# Patient Record
Sex: FEMALE | Race: WHITE | Employment: PART TIME | URBAN - METROPOLITAN AREA
[De-identification: names, ages, dates, MRNs, and addresses within clinical notes are randomized per-mention and may not be internally consistent; named-entity substitution may affect disease eponyms.]

---

## 2017-06-13 DIAGNOSIS — E55.9 VITAMIN D DEFICIENCY: ICD-10-CM

## 2017-06-13 DIAGNOSIS — K86.89 PANCREATIC INSUFFICIENCY: ICD-10-CM

## 2017-06-13 DIAGNOSIS — E89.1 POST-PANCREATECTOMY DIABETES (H): Primary | ICD-10-CM

## 2017-06-13 DIAGNOSIS — E46 MALNUTRITION (H): ICD-10-CM

## 2017-06-13 DIAGNOSIS — Z90.410 POST-PANCREATECTOMY DIABETES (H): Primary | ICD-10-CM

## 2017-06-13 DIAGNOSIS — E13.9 POST-PANCREATECTOMY DIABETES (H): Primary | ICD-10-CM

## 2017-06-15 ENCOUNTER — APPOINTMENT (OUTPATIENT)
Dept: ULTRASOUND IMAGING | Facility: CLINIC | Age: 42
DRG: 641 | End: 2017-06-15
Attending: STUDENT IN AN ORGANIZED HEALTH CARE EDUCATION/TRAINING PROGRAM
Payer: COMMERCIAL

## 2017-06-15 ENCOUNTER — ALLIED HEALTH/NURSE VISIT (OUTPATIENT)
Dept: TRANSPLANT | Facility: CLINIC | Age: 42
End: 2017-06-15
Attending: PEDIATRICS
Payer: COMMERCIAL

## 2017-06-15 ENCOUNTER — HOSPITAL ENCOUNTER (INPATIENT)
Facility: CLINIC | Age: 42
LOS: 3 days | Discharge: HOME | DRG: 641 | End: 2017-06-18
Attending: EMERGENCY MEDICINE | Admitting: INTERNAL MEDICINE
Payer: COMMERCIAL

## 2017-06-15 ENCOUNTER — APPOINTMENT (OUTPATIENT)
Dept: GENERAL RADIOLOGY | Facility: CLINIC | Age: 42
DRG: 641 | End: 2017-06-15
Payer: COMMERCIAL

## 2017-06-15 ENCOUNTER — DOCUMENTATION ONLY (OUTPATIENT)
Dept: TRANSPLANT | Facility: CLINIC | Age: 42
End: 2017-06-15

## 2017-06-15 ENCOUNTER — TELEPHONE (OUTPATIENT)
Dept: TRANSPLANT | Facility: CLINIC | Age: 42
End: 2017-06-15

## 2017-06-15 ENCOUNTER — APPOINTMENT (OUTPATIENT)
Dept: GENERAL RADIOLOGY | Facility: CLINIC | Age: 42
DRG: 641 | End: 2017-06-15
Attending: INTERNAL MEDICINE
Payer: COMMERCIAL

## 2017-06-15 VITALS — WEIGHT: 120.4 LBS | BODY MASS INDEX: 22.73 KG/M2 | HEIGHT: 61 IN

## 2017-06-15 DIAGNOSIS — E46 MALNUTRITION (H): ICD-10-CM

## 2017-06-15 DIAGNOSIS — E13.9 POST-PANCREATECTOMY DIABETES (H): Primary | ICD-10-CM

## 2017-06-15 DIAGNOSIS — E89.1 POST-PANCREATECTOMY DIABETES (H): ICD-10-CM

## 2017-06-15 DIAGNOSIS — K86.89 PANCREATIC INSUFFICIENCY: ICD-10-CM

## 2017-06-15 DIAGNOSIS — E13.9 POST-PANCREATECTOMY DIABETES (H): ICD-10-CM

## 2017-06-15 DIAGNOSIS — E10.10 DIABETIC KETOACIDOSIS WITHOUT COMA ASSOCIATED WITH TYPE 1 DIABETES MELLITUS (H): Primary | ICD-10-CM

## 2017-06-15 DIAGNOSIS — E55.9 VITAMIN D DEFICIENCY: ICD-10-CM

## 2017-06-15 DIAGNOSIS — Z90.410 POST-PANCREATECTOMY DIABETES (H): Primary | ICD-10-CM

## 2017-06-15 DIAGNOSIS — Z90.410 POST-PANCREATECTOMY DIABETES (H): ICD-10-CM

## 2017-06-15 DIAGNOSIS — E89.1 POST-PANCREATECTOMY DIABETES (H): Primary | ICD-10-CM

## 2017-06-15 DIAGNOSIS — E08.10 DIABETIC KETOACIDOSIS WITHOUT COMA ASSOCIATED WITH DIABETES MELLITUS DUE TO UNDERLYING CONDITION (H): ICD-10-CM

## 2017-06-15 DIAGNOSIS — Z90.410 ABSENCE OF PANCREAS, ACQUIRED TOTAL: ICD-10-CM

## 2017-06-15 PROBLEM — E11.10 DKA (DIABETIC KETOACIDOSES): Status: ACTIVE | Noted: 2017-06-15

## 2017-06-15 LAB
ALBUMIN SERPL-MCNC: 2.9 G/DL (ref 3.4–5)
ALBUMIN SERPL-MCNC: 4 G/DL (ref 3.4–5)
ALBUMIN UR-MCNC: 30 MG/DL
ALP SERPL-CCNC: 112 U/L (ref 40–150)
ALP SERPL-CCNC: 142 U/L (ref 40–150)
ALT SERPL W P-5'-P-CCNC: 246 U/L (ref 0–50)
ALT SERPL W P-5'-P-CCNC: 384 U/L (ref 0–50)
AMYLASE SERPL-CCNC: 10 U/L (ref 30–110)
ANION GAP SERPL CALCULATED.3IONS-SCNC: 10 MMOL/L (ref 3–14)
ANION GAP SERPL CALCULATED.3IONS-SCNC: 12 MMOL/L (ref 3–14)
ANION GAP SERPL CALCULATED.3IONS-SCNC: 13 MMOL/L (ref 3–14)
ANION GAP SERPL CALCULATED.3IONS-SCNC: 22 MMOL/L (ref 3–14)
APPEARANCE UR: ABNORMAL
AST SERPL W P-5'-P-CCNC: 129 U/L (ref 0–45)
AST SERPL W P-5'-P-CCNC: 256 U/L (ref 0–45)
BASE DEFICIT BLDA-SCNC: 20.8 MMOL/L
BASE DEFICIT BLDV-SCNC: 15.6 MMOL/L
BASOPHILS # BLD AUTO: 0 10E9/L (ref 0–0.2)
BASOPHILS # BLD AUTO: 0 10E9/L (ref 0–0.2)
BASOPHILS NFR BLD AUTO: 0.1 %
BASOPHILS NFR BLD AUTO: 0.4 %
BILIRUB DIRECT SERPL-MCNC: 0.1 MG/DL (ref 0–0.2)
BILIRUB SERPL-MCNC: 0.4 MG/DL (ref 0.2–1.3)
BILIRUB SERPL-MCNC: 0.7 MG/DL (ref 0.2–1.3)
BILIRUB UR QL STRIP: NEGATIVE
BUN SERPL-MCNC: 4 MG/DL (ref 7–30)
BUN SERPL-MCNC: 5 MG/DL (ref 7–30)
BUN SERPL-MCNC: 5 MG/DL (ref 7–30)
BUN SERPL-MCNC: 7 MG/DL (ref 7–30)
C PEPTIDE SERPL-MCNC: 0.7 NG/ML (ref 0.9–6.9)
CALCIUM SERPL-MCNC: 6.8 MG/DL (ref 8.5–10.1)
CALCIUM SERPL-MCNC: 7 MG/DL (ref 8.5–10.1)
CALCIUM SERPL-MCNC: 7.2 MG/DL (ref 8.5–10.1)
CALCIUM SERPL-MCNC: 8.4 MG/DL (ref 8.5–10.1)
CHLORIDE SERPL-SCNC: 105 MMOL/L (ref 94–109)
CHLORIDE SERPL-SCNC: 115 MMOL/L (ref 94–109)
CHLORIDE SERPL-SCNC: 115 MMOL/L (ref 94–109)
CHLORIDE SERPL-SCNC: 116 MMOL/L (ref 94–109)
CO2 BLDCOV-SCNC: 5 MMOL/L (ref 21–28)
CO2 SERPL-SCNC: 11 MMOL/L (ref 20–32)
CO2 SERPL-SCNC: 14 MMOL/L (ref 20–32)
CO2 SERPL-SCNC: 7 MMOL/L (ref 20–32)
CO2 SERPL-SCNC: 9 MMOL/L (ref 20–32)
COLOR UR AUTO: YELLOW
CREAT SERPL-MCNC: 0.54 MG/DL (ref 0.52–1.04)
CREAT SERPL-MCNC: 0.55 MG/DL (ref 0.52–1.04)
CREAT SERPL-MCNC: 0.56 MG/DL (ref 0.52–1.04)
CREAT SERPL-MCNC: 0.72 MG/DL (ref 0.52–1.04)
DIFFERENTIAL METHOD BLD: ABNORMAL
DIFFERENTIAL METHOD BLD: ABNORMAL
EOSINOPHIL # BLD AUTO: 0 10E9/L (ref 0–0.7)
EOSINOPHIL # BLD AUTO: 0.1 10E9/L (ref 0–0.7)
EOSINOPHIL NFR BLD AUTO: 0.2 %
EOSINOPHIL NFR BLD AUTO: 1.1 %
ERYTHROCYTE [DISTWIDTH] IN BLOOD BY AUTOMATED COUNT: 15.1 % (ref 10–15)
ERYTHROCYTE [DISTWIDTH] IN BLOOD BY AUTOMATED COUNT: 15.3 % (ref 10–15)
FERRITIN SERPL-MCNC: 111 NG/ML (ref 12–150)
GFR SERPL CREATININE-BSD FRML MDRD: 89 ML/MIN/1.7M2
GFR SERPL CREATININE-BSD FRML MDRD: ABNORMAL ML/MIN/1.7M2
GLUCOSE BLDC GLUCOMTR-MCNC: 189 MG/DL (ref 70–99)
GLUCOSE BLDC GLUCOMTR-MCNC: 190 MG/DL (ref 70–99)
GLUCOSE BLDC GLUCOMTR-MCNC: 209 MG/DL (ref 70–99)
GLUCOSE BLDC GLUCOMTR-MCNC: 215 MG/DL (ref 70–99)
GLUCOSE BLDC GLUCOMTR-MCNC: 220 MG/DL (ref 70–99)
GLUCOSE BLDC GLUCOMTR-MCNC: 270 MG/DL (ref 70–99)
GLUCOSE BLDC GLUCOMTR-MCNC: 303 MG/DL (ref 70–99)
GLUCOSE BLDC GLUCOMTR-MCNC: 340 MG/DL (ref 70–99)
GLUCOSE SERPL-MCNC: 180 MG/DL (ref 70–99)
GLUCOSE SERPL-MCNC: 239 MG/DL (ref 70–99)
GLUCOSE SERPL-MCNC: 312 MG/DL (ref 70–99)
GLUCOSE SERPL-MCNC: 366 MG/DL (ref 70–99)
GLUCOSE UR STRIP-MCNC: >1000 MG/DL
HBA1C MFR BLD: 12.8 % (ref 4.3–6)
HCO3 BLD-SCNC: 6 MMOL/L (ref 21–28)
HCO3 BLDV-SCNC: 11 MMOL/L (ref 21–28)
HCT VFR BLD AUTO: 35.6 % (ref 35–47)
HCT VFR BLD AUTO: 41.2 % (ref 35–47)
HETEROPH AB SER QL: NEGATIVE
HGB BLD-MCNC: 12.1 G/DL (ref 11.7–15.7)
HGB BLD-MCNC: 13.6 G/DL (ref 11.7–15.7)
HGB UR QL STRIP: ABNORMAL
IMM GRANULOCYTES # BLD: 0 10E9/L (ref 0–0.4)
IMM GRANULOCYTES # BLD: 0 10E9/L (ref 0–0.4)
IMM GRANULOCYTES NFR BLD: 0.4 %
IMM GRANULOCYTES NFR BLD: 0.4 %
IRON SATN MFR SERPL: 13 % (ref 15–46)
IRON SERPL-MCNC: 36 UG/DL (ref 35–180)
KETONES BLD-SCNC: 3.9 MMOL/L (ref 0–0.6)
KETONES BLD-SCNC: 4.1 MMOL/L (ref 0–0.6)
KETONES BLD-SCNC: 4.8 MMOL/L (ref 0–0.6)
KETONES BLD-SCNC: 6.1 MMOL/L (ref 0–0.6)
KETONES UR STRIP-MCNC: >150 MG/DL
LACTATE BLD-SCNC: 1.2 MMOL/L (ref 0.7–2.1)
LACTATE BLD-SCNC: 1.4 MMOL/L (ref 0.7–2.1)
LEUKOCYTE ESTERASE UR QL STRIP: NEGATIVE
LYMPHOCYTES # BLD AUTO: 2.1 10E9/L (ref 0.8–5.3)
LYMPHOCYTES # BLD AUTO: 2.4 10E9/L (ref 0.8–5.3)
LYMPHOCYTES NFR BLD AUTO: 22.3 %
LYMPHOCYTES NFR BLD AUTO: 23.9 %
MAGNESIUM SERPL-MCNC: 1.6 MG/DL (ref 1.6–2.3)
MCH RBC QN AUTO: 30.2 PG (ref 26.5–33)
MCH RBC QN AUTO: 30.6 PG (ref 26.5–33)
MCHC RBC AUTO-ENTMCNC: 33 G/DL (ref 31.5–36.5)
MCHC RBC AUTO-ENTMCNC: 34 G/DL (ref 31.5–36.5)
MCV RBC AUTO: 89 FL (ref 78–100)
MCV RBC AUTO: 93 FL (ref 78–100)
MONOCYTES # BLD AUTO: 0.5 10E9/L (ref 0–1.3)
MONOCYTES # BLD AUTO: 0.9 10E9/L (ref 0–1.3)
MONOCYTES NFR BLD AUTO: 4.8 %
MONOCYTES NFR BLD AUTO: 9.1 %
MUCOUS THREADS #/AREA URNS LPF: PRESENT /LPF
NEUTROPHILS # BLD AUTO: 6.5 10E9/L (ref 1.6–8.3)
NEUTROPHILS # BLD AUTO: 6.9 10E9/L (ref 1.6–8.3)
NEUTROPHILS NFR BLD AUTO: 67.9 %
NEUTROPHILS NFR BLD AUTO: 69.4 %
NITRATE UR QL: NEGATIVE
NRBC # BLD AUTO: 0 10*3/UL
NRBC # BLD AUTO: 0 10*3/UL
NRBC BLD AUTO-RTO: 0 /100
NRBC BLD AUTO-RTO: 0 /100
O2/TOTAL GAS SETTING VFR VENT: 21 %
O2/TOTAL GAS SETTING VFR VENT: ABNORMAL %
OSMOLALITY SERPL: 284 MMOL/KG (ref 275–295)
OXYHGB MFR BLD: 96 % (ref 92–100)
PCO2 BLD: 15 MM HG (ref 35–45)
PCO2 BLDV: 20 MM HG (ref 40–50)
PCO2 BLDV: 30 MM HG (ref 40–50)
PH BLD: 7.18 PH (ref 7.35–7.45)
PH BLDV: 7.03 PH (ref 7.32–7.43)
PH BLDV: 7.19 PH (ref 7.32–7.43)
PH UR STRIP: 5.5 PH (ref 5–7)
PHOSPHATE SERPL-MCNC: 0.7 MG/DL (ref 2.5–4.5)
PHOSPHATE SERPL-MCNC: 0.8 MG/DL (ref 2.5–4.5)
PHOSPHATE SERPL-MCNC: 1.2 MG/DL (ref 2.5–4.5)
PLATELET # BLD AUTO: 181 10E9/L (ref 150–450)
PLATELET # BLD AUTO: 244 10E9/L (ref 150–450)
PO2 BLD: 152 MM HG (ref 80–105)
PO2 BLDV: 16 MM HG (ref 25–47)
PO2 BLDV: 21 MM HG (ref 25–47)
POTASSIUM SERPL-SCNC: 3.2 MMOL/L (ref 3.4–5.3)
POTASSIUM SERPL-SCNC: 3.4 MMOL/L (ref 3.4–5.3)
POTASSIUM SERPL-SCNC: 3.6 MMOL/L (ref 3.4–5.3)
POTASSIUM SERPL-SCNC: 3.8 MMOL/L (ref 3.4–5.3)
POTASSIUM SERPL-SCNC: 3.8 MMOL/L (ref 3.4–5.3)
PREALB SERPL IA-MCNC: 12 MG/DL (ref 15–45)
PROT SERPL-MCNC: 5.4 G/DL (ref 6.8–8.8)
PROT SERPL-MCNC: 6.8 G/DL (ref 6.8–8.8)
RBC # BLD AUTO: 4.01 10E12/L (ref 3.8–5.2)
RBC # BLD AUTO: 4.45 10E12/L (ref 3.8–5.2)
RBC #/AREA URNS AUTO: 8 /HPF (ref 0–2)
SAO2 % BLDV FROM PO2: 19 %
SODIUM SERPL-SCNC: 135 MMOL/L (ref 133–144)
SODIUM SERPL-SCNC: 137 MMOL/L (ref 133–144)
SODIUM SERPL-SCNC: 138 MMOL/L (ref 133–144)
SODIUM SERPL-SCNC: 139 MMOL/L (ref 133–144)
SP GR UR STRIP: 1.02 (ref 1–1.03)
SQUAMOUS #/AREA URNS AUTO: 16 /HPF (ref 0–1)
TIBC SERPL-MCNC: 269 UG/DL (ref 240–430)
TRANS CELLS #/AREA URNS HPF: <1 /HPF (ref 0–1)
TROPONIN I SERPL-MCNC: NORMAL UG/L (ref 0–0.04)
URN SPEC COLLECT METH UR: ABNORMAL
UROBILINOGEN UR STRIP-MCNC: NORMAL MG/DL (ref 0–2)
VIT B12 SERPL-MCNC: 1031 PG/ML (ref 193–986)
WBC # BLD AUTO: 9.6 10E9/L (ref 4–11)
WBC # BLD AUTO: 9.9 10E9/L (ref 4–11)
WBC #/AREA URNS AUTO: 2 /HPF (ref 0–2)
YEAST #/AREA URNS HPF: ABNORMAL /HPF

## 2017-06-15 PROCEDURE — 85025 COMPLETE CBC W/AUTO DIFF WBC: CPT | Performed by: INTERNAL MEDICINE

## 2017-06-15 PROCEDURE — 84681 ASSAY OF C-PEPTIDE: CPT | Performed by: PEDIATRICS

## 2017-06-15 PROCEDURE — 27210995 ZZH RX 272: Performed by: STUDENT IN AN ORGANIZED HEALTH CARE EDUCATION/TRAINING PROGRAM

## 2017-06-15 PROCEDURE — 36600 WITHDRAWAL OF ARTERIAL BLOOD: CPT

## 2017-06-15 PROCEDURE — 25800025 ZZH RX 258: Performed by: INTERNAL MEDICINE

## 2017-06-15 PROCEDURE — 82805 BLOOD GASES W/O2 SATURATION: CPT | Performed by: STUDENT IN AN ORGANIZED HEALTH CARE EDUCATION/TRAINING PROGRAM

## 2017-06-15 PROCEDURE — 25000128 H RX IP 250 OP 636: Performed by: FAMILY MEDICINE

## 2017-06-15 PROCEDURE — 82150 ASSAY OF AMYLASE: CPT | Performed by: INTERNAL MEDICINE

## 2017-06-15 PROCEDURE — 83605 ASSAY OF LACTIC ACID: CPT | Performed by: INTERNAL MEDICINE

## 2017-06-15 PROCEDURE — 25000125 ZZHC RX 250: Performed by: STUDENT IN AN ORGANIZED HEALTH CARE EDUCATION/TRAINING PROGRAM

## 2017-06-15 PROCEDURE — 82607 VITAMIN B-12: CPT | Performed by: PEDIATRICS

## 2017-06-15 PROCEDURE — 36415 COLL VENOUS BLD VENIPUNCTURE: CPT | Performed by: STUDENT IN AN ORGANIZED HEALTH CARE EDUCATION/TRAINING PROGRAM

## 2017-06-15 PROCEDURE — 83036 HEMOGLOBIN GLYCOSYLATED A1C: CPT | Performed by: PEDIATRICS

## 2017-06-15 PROCEDURE — 83930 ASSAY OF BLOOD OSMOLALITY: CPT | Performed by: INTERNAL MEDICINE

## 2017-06-15 PROCEDURE — 93010 ELECTROCARDIOGRAM REPORT: CPT | Performed by: INTERNAL MEDICINE

## 2017-06-15 PROCEDURE — 82010 KETONE BODYS QUAN: CPT | Performed by: INTERNAL MEDICINE

## 2017-06-15 PROCEDURE — 84134 ASSAY OF PREALBUMIN: CPT | Performed by: PEDIATRICS

## 2017-06-15 PROCEDURE — 25000125 ZZHC RX 250

## 2017-06-15 PROCEDURE — 71010 XR CHEST PORT 1 VW: CPT

## 2017-06-15 PROCEDURE — 82728 ASSAY OF FERRITIN: CPT | Performed by: PEDIATRICS

## 2017-06-15 PROCEDURE — 82010 KETONE BODYS QUAN: CPT | Performed by: EMERGENCY MEDICINE

## 2017-06-15 PROCEDURE — 82374 ASSAY BLOOD CARBON DIOXIDE: CPT | Performed by: STUDENT IN AN ORGANIZED HEALTH CARE EDUCATION/TRAINING PROGRAM

## 2017-06-15 PROCEDURE — 82803 BLOOD GASES ANY COMBINATION: CPT | Performed by: STUDENT IN AN ORGANIZED HEALTH CARE EDUCATION/TRAINING PROGRAM

## 2017-06-15 PROCEDURE — 80053 COMPREHEN METABOLIC PANEL: CPT | Performed by: PEDIATRICS

## 2017-06-15 PROCEDURE — 85025 COMPLETE CBC W/AUTO DIFF WBC: CPT | Performed by: PEDIATRICS

## 2017-06-15 PROCEDURE — 83550 IRON BINDING TEST: CPT | Performed by: PEDIATRICS

## 2017-06-15 PROCEDURE — 82747 ASSAY OF FOLIC ACID RBC: CPT | Performed by: PEDIATRICS

## 2017-06-15 PROCEDURE — 86308 HETEROPHILE ANTIBODY SCREEN: CPT | Performed by: INTERNAL MEDICINE

## 2017-06-15 PROCEDURE — 82803 BLOOD GASES ANY COMBINATION: CPT

## 2017-06-15 PROCEDURE — 83540 ASSAY OF IRON: CPT | Performed by: PEDIATRICS

## 2017-06-15 PROCEDURE — 82306 VITAMIN D 25 HYDROXY: CPT | Performed by: PEDIATRICS

## 2017-06-15 PROCEDURE — 99211 OFF/OP EST MAY X REQ PHY/QHP: CPT

## 2017-06-15 PROCEDURE — 81001 URINALYSIS AUTO W/SCOPE: CPT | Performed by: EMERGENCY MEDICINE

## 2017-06-15 PROCEDURE — 84446 ASSAY OF VITAMIN E: CPT | Performed by: PEDIATRICS

## 2017-06-15 PROCEDURE — 93005 ELECTROCARDIOGRAM TRACING: CPT

## 2017-06-15 PROCEDURE — 84132 ASSAY OF SERUM POTASSIUM: CPT | Performed by: FAMILY MEDICINE

## 2017-06-15 PROCEDURE — 96361 HYDRATE IV INFUSION ADD-ON: CPT | Mod: 59

## 2017-06-15 PROCEDURE — 20000004 ZZH R&B ICU UMMC

## 2017-06-15 PROCEDURE — 96366 THER/PROPH/DIAG IV INF ADDON: CPT

## 2017-06-15 PROCEDURE — 84590 ASSAY OF VITAMIN A: CPT | Performed by: PEDIATRICS

## 2017-06-15 PROCEDURE — 83605 ASSAY OF LACTIC ACID: CPT

## 2017-06-15 PROCEDURE — 84484 ASSAY OF TROPONIN QUANT: CPT | Performed by: INTERNAL MEDICINE

## 2017-06-15 PROCEDURE — 96365 THER/PROPH/DIAG IV INF INIT: CPT

## 2017-06-15 PROCEDURE — 84630 ASSAY OF ZINC: CPT | Performed by: PEDIATRICS

## 2017-06-15 PROCEDURE — 83735 ASSAY OF MAGNESIUM: CPT | Performed by: INTERNAL MEDICINE

## 2017-06-15 PROCEDURE — 99285 EMERGENCY DEPT VISIT HI MDM: CPT | Mod: 25

## 2017-06-15 PROCEDURE — 84100 ASSAY OF PHOSPHORUS: CPT | Performed by: INTERNAL MEDICINE

## 2017-06-15 PROCEDURE — 80076 HEPATIC FUNCTION PANEL: CPT | Performed by: INTERNAL MEDICINE

## 2017-06-15 PROCEDURE — 84100 ASSAY OF PHOSPHORUS: CPT | Performed by: STUDENT IN AN ORGANIZED HEALTH CARE EDUCATION/TRAINING PROGRAM

## 2017-06-15 PROCEDURE — 80048 BASIC METABOLIC PNL TOTAL CA: CPT | Performed by: INTERNAL MEDICINE

## 2017-06-15 PROCEDURE — 99291 CRITICAL CARE FIRST HOUR: CPT | Mod: GC | Performed by: INTERNAL MEDICINE

## 2017-06-15 PROCEDURE — 83735 ASSAY OF MAGNESIUM: CPT | Performed by: STUDENT IN AN ORGANIZED HEALTH CARE EDUCATION/TRAINING PROGRAM

## 2017-06-15 PROCEDURE — 80048 BASIC METABOLIC PNL TOTAL CA: CPT | Performed by: STUDENT IN AN ORGANIZED HEALTH CARE EDUCATION/TRAINING PROGRAM

## 2017-06-15 PROCEDURE — 25000132 ZZH RX MED GY IP 250 OP 250 PS 637: Performed by: STUDENT IN AN ORGANIZED HEALTH CARE EDUCATION/TRAINING PROGRAM

## 2017-06-15 PROCEDURE — 99285 EMERGENCY DEPT VISIT HI MDM: CPT | Mod: Z6 | Performed by: EMERGENCY MEDICINE

## 2017-06-15 PROCEDURE — 87040 BLOOD CULTURE FOR BACTERIA: CPT | Performed by: STUDENT IN AN ORGANIZED HEALTH CARE EDUCATION/TRAINING PROGRAM

## 2017-06-15 PROCEDURE — 82150 ASSAY OF AMYLASE: CPT | Performed by: STUDENT IN AN ORGANIZED HEALTH CARE EDUCATION/TRAINING PROGRAM

## 2017-06-15 PROCEDURE — 76700 US EXAM ABDOM COMPLETE: CPT

## 2017-06-15 PROCEDURE — 00000146 ZZHCL STATISTIC GLUCOSE BY METER IP

## 2017-06-15 PROCEDURE — 82010 KETONE BODYS QUAN: CPT | Performed by: STUDENT IN AN ORGANIZED HEALTH CARE EDUCATION/TRAINING PROGRAM

## 2017-06-15 PROCEDURE — 96375 TX/PRO/DX INJ NEW DRUG ADDON: CPT

## 2017-06-15 PROCEDURE — 25000128 H RX IP 250 OP 636: Performed by: STUDENT IN AN ORGANIZED HEALTH CARE EDUCATION/TRAINING PROGRAM

## 2017-06-15 PROCEDURE — 25000128 H RX IP 250 OP 636: Performed by: EMERGENCY MEDICINE

## 2017-06-15 PROCEDURE — 74000 XR ABDOMEN PORT F1 VW: CPT

## 2017-06-15 PROCEDURE — 25000125 ZZHC RX 250: Performed by: FAMILY MEDICINE

## 2017-06-15 RX ORDER — DEXTROSE MONOHYDRATE 25 G/50ML
25-50 INJECTION, SOLUTION INTRAVENOUS
Status: DISCONTINUED | OUTPATIENT
Start: 2017-06-15 | End: 2017-06-15

## 2017-06-15 RX ORDER — AMITRIPTYLINE HYDROCHLORIDE 100 MG/1
100 TABLET ORAL AT BEDTIME
Status: DISCONTINUED | OUTPATIENT
Start: 2017-06-15 | End: 2017-06-18 | Stop reason: HOSPADM

## 2017-06-15 RX ORDER — DEXTROSE MONOHYDRATE, SODIUM CHLORIDE, AND POTASSIUM CHLORIDE 50; 2.24; 4.5 G/1000ML; G/1000ML; G/1000ML
INJECTION, SOLUTION INTRAVENOUS CONTINUOUS
Status: DISCONTINUED | OUTPATIENT
Start: 2017-06-15 | End: 2017-06-17

## 2017-06-15 RX ORDER — OXYCODONE HYDROCHLORIDE 5 MG/1
5 TABLET ORAL EVERY 6 HOURS PRN
Status: DISCONTINUED | OUTPATIENT
Start: 2017-06-15 | End: 2017-06-16

## 2017-06-15 RX ORDER — OXYCODONE HYDROCHLORIDE 30 MG/1
30 TABLET ORAL EVERY 4 HOURS PRN
Status: DISCONTINUED | OUTPATIENT
Start: 2017-06-15 | End: 2017-06-15

## 2017-06-15 RX ORDER — POTASSIUM CHLORIDE 1.5 G/1.58G
20-40 POWDER, FOR SOLUTION ORAL
Status: DISCONTINUED | OUTPATIENT
Start: 2017-06-15 | End: 2017-06-17

## 2017-06-15 RX ORDER — LIDOCAINE HYDROCHLORIDE 10 MG/ML
INJECTION, SOLUTION EPIDURAL; INFILTRATION; INTRACAUDAL; PERINEURAL
Status: COMPLETED
Start: 2017-06-15 | End: 2017-06-15

## 2017-06-15 RX ORDER — NICOTINE POLACRILEX 4 MG
15-30 LOZENGE BUCCAL
Status: DISCONTINUED | OUTPATIENT
Start: 2017-06-15 | End: 2017-06-15

## 2017-06-15 RX ORDER — POTASSIUM CHLORIDE 29.8 MG/ML
20 INJECTION INTRAVENOUS
Status: DISCONTINUED | OUTPATIENT
Start: 2017-06-15 | End: 2017-06-17

## 2017-06-15 RX ORDER — ONDANSETRON 2 MG/ML
8 INJECTION INTRAMUSCULAR; INTRAVENOUS ONCE
Status: COMPLETED | OUTPATIENT
Start: 2017-06-15 | End: 2017-06-15

## 2017-06-15 RX ORDER — MORPHINE SULFATE 4 MG/ML
4 INJECTION, SOLUTION INTRAMUSCULAR; INTRAVENOUS ONCE
Status: COMPLETED | OUTPATIENT
Start: 2017-06-15 | End: 2017-06-15

## 2017-06-15 RX ORDER — DEXTROSE MONOHYDRATE 25 G/50ML
25-50 INJECTION, SOLUTION INTRAVENOUS
Status: DISCONTINUED | OUTPATIENT
Start: 2017-06-15 | End: 2017-06-17

## 2017-06-15 RX ORDER — POTASSIUM CHLORIDE 7.45 MG/ML
10 INJECTION INTRAVENOUS
Status: DISCONTINUED | OUTPATIENT
Start: 2017-06-15 | End: 2017-06-17

## 2017-06-15 RX ORDER — GABAPENTIN 600 MG/1
600 TABLET ORAL AT BEDTIME
Status: DISCONTINUED | OUTPATIENT
Start: 2017-06-15 | End: 2017-06-15

## 2017-06-15 RX ORDER — POTASSIUM CHLORIDE 750 MG/1
20-40 TABLET, EXTENDED RELEASE ORAL
Status: DISCONTINUED | OUTPATIENT
Start: 2017-06-15 | End: 2017-06-17

## 2017-06-15 RX ORDER — ONDANSETRON 2 MG/ML
4 INJECTION INTRAMUSCULAR; INTRAVENOUS EVERY 6 HOURS PRN
Status: DISCONTINUED | OUTPATIENT
Start: 2017-06-15 | End: 2017-06-18 | Stop reason: HOSPADM

## 2017-06-15 RX ORDER — NICOTINE POLACRILEX 4 MG
15-30 LOZENGE BUCCAL
Status: DISCONTINUED | OUTPATIENT
Start: 2017-06-15 | End: 2017-06-17

## 2017-06-15 RX ORDER — MAGNESIUM SULFATE HEPTAHYDRATE 40 MG/ML
4 INJECTION, SOLUTION INTRAVENOUS EVERY 4 HOURS PRN
Status: DISCONTINUED | OUTPATIENT
Start: 2017-06-15 | End: 2017-06-18 | Stop reason: HOSPADM

## 2017-06-15 RX ORDER — TRAZODONE HYDROCHLORIDE 100 MG/1
100 TABLET ORAL AT BEDTIME
Status: DISCONTINUED | OUTPATIENT
Start: 2017-06-15 | End: 2017-06-15

## 2017-06-15 RX ORDER — POTASSIUM CL/LIDO/0.9 % NACL 10MEQ/0.1L
10 INTRAVENOUS SOLUTION, PIGGYBACK (ML) INTRAVENOUS
Status: DISCONTINUED | OUTPATIENT
Start: 2017-06-15 | End: 2017-06-17

## 2017-06-15 RX ORDER — NALOXONE HYDROCHLORIDE 0.4 MG/ML
.1-.4 INJECTION, SOLUTION INTRAMUSCULAR; INTRAVENOUS; SUBCUTANEOUS
Status: DISCONTINUED | OUTPATIENT
Start: 2017-06-15 | End: 2017-06-18 | Stop reason: HOSPADM

## 2017-06-15 RX ADMIN — POTASSIUM CHLORIDE: 2 INJECTION, SOLUTION, CONCENTRATE INTRAVENOUS at 16:26

## 2017-06-15 RX ADMIN — SODIUM CHLORIDE 1000 ML: 9 INJECTION, SOLUTION INTRAVENOUS at 10:07

## 2017-06-15 RX ADMIN — MORPHINE SULFATE 4 MG: 4 INJECTION, SOLUTION INTRAMUSCULAR; INTRAVENOUS at 11:23

## 2017-06-15 RX ADMIN — SODIUM CHLORIDE 1000 ML: 9 INJECTION, SOLUTION INTRAVENOUS at 11:25

## 2017-06-15 RX ADMIN — HUMAN INSULIN 5.5 UNITS/HR: 100 INJECTION, SOLUTION SUBCUTANEOUS at 18:59

## 2017-06-15 RX ADMIN — POTASSIUM CHLORIDE, DEXTROSE MONOHYDRATE AND SODIUM CHLORIDE: 224; 5; 450 INJECTION, SOLUTION INTRAVENOUS at 21:40

## 2017-06-15 RX ADMIN — ONDANSETRON 4 MG: 2 INJECTION INTRAMUSCULAR; INTRAVENOUS at 14:46

## 2017-06-15 RX ADMIN — POTASSIUM CHLORIDE, DEXTROSE MONOHYDRATE AND SODIUM CHLORIDE: 224; 5; 450 INJECTION, SOLUTION INTRAVENOUS at 17:25

## 2017-06-15 RX ADMIN — ONDANSETRON 8 MG: 2 INJECTION INTRAMUSCULAR; INTRAVENOUS at 11:23

## 2017-06-15 RX ADMIN — ONDANSETRON 4 MG: 2 INJECTION INTRAMUSCULAR; INTRAVENOUS at 23:19

## 2017-06-15 RX ADMIN — SODIUM CHLORIDE 1000 ML: 9 INJECTION, SOLUTION INTRAVENOUS at 17:53

## 2017-06-15 RX ADMIN — LIDOCAINE HYDROCHLORIDE: 10 INJECTION, SOLUTION EPIDURAL; INFILTRATION; INTRACAUDAL; PERINEURAL at 11:55

## 2017-06-15 RX ADMIN — HUMAN INSULIN 3 UNITS/HR: 100 INJECTION, SOLUTION SUBCUTANEOUS at 14:27

## 2017-06-15 RX ADMIN — HUMAN INSULIN 4.5 UNITS/HR: 100 INJECTION, SOLUTION SUBCUTANEOUS at 11:30

## 2017-06-15 RX ADMIN — SODIUM PHOSPHATE, MONOBASIC, MONOHYDRATE AND SODIUM PHOSPHATE, DIBASIC, ANHYDROUS 25 MMOL: 276; 142 INJECTION, SOLUTION INTRAVENOUS at 21:36

## 2017-06-15 ASSESSMENT — PAIN DESCRIPTION - DESCRIPTORS: DESCRIPTORS: ACHING;BURNING;CONSTANT

## 2017-06-15 ASSESSMENT — ENCOUNTER SYMPTOMS
DIAPHORESIS: 0
CONSTIPATION: 0
APPETITE CHANGE: 1
SORE THROAT: 0
NAUSEA: 1
VOMITING: 1
HEMATURIA: 0
SHORTNESS OF BREATH: 1
LIGHT-HEADEDNESS: 1
EYE REDNESS: 0
HEADACHES: 1
DYSURIA: 0
RHINORRHEA: 0
COUGH: 0
ARTHRALGIAS: 0
DIARRHEA: 1
EYE DISCHARGE: 0
MYALGIAS: 0
FEVER: 0
ABDOMINAL PAIN: 1
CHILLS: 0
DIZZINESS: 1

## 2017-06-15 NOTE — H&P
Medical ICU  History & Physical    Beverly Elena MRN: [id number]  Age: 41 year old, : 1975  Date of Admission:6/15/2017  Primary care provider: Marleny Hathaway       Chief Complaint     Abnormal labs at clinic/ nausea      History of Present Illness     Ms. 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 She presented from clinic with abnormal labs.    Patient reports that she has been having recent nausea and abdominal pain. She notes she has also had a difficult time controlling her blood sugars at home due to her gastroparesis. She is on an insulin pump with a basal rate of 1-2units/hr. Notes she corrects her blood sugar with bolus insulin 6 times a day. She has a had blood glucose levels of 300-600s. She also reports having a few times low blood sugars as low as 60. She finds that it has been difficult to control her blood sugars because her gastric emptying is unpredictable. She notes abdominal pain, more prominent in the umbilical area. She denies a change in her stool. She has chronic diarrhea, reports some more bulk in her stool. Patient notes that she has been having shortness of breath in the last few weeks. No cough. No fever. She denies sore throat. She has no chest pain. Reports generalized fatigue. She was at endocrinology visit prior to presenting to the ED and was noted to have abnormal labs.     In the ED the patient was noted to be tachypneic. Her labs from clinic demonstrated she had an anion gap acidosis and blood glucose of 312. Her PH on venous blood gas was 7.03 and her pCO2 20. She was given 2L bolus of NS, and started on insulin drip and maintenance IV fluids. She was subsequently transferred to the ICU for further work up.      Past Medical History     Past Medical History:   Diagnosis Date     Absence of pancreas,  acquired total 6/28/2013     Bile duct disease      Chronic pain      Chronic pancreatitis (H) 6/28/2013     Chronic relapsing pancreatitis (H) 6/28/2013     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) 6/28/2013     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) 6/28/2013     Endometriosis      Endometriosis 9/9/2011     Exocrine pancreatic insufficiency 6/28/2013     Gastro-oesophageal reflux disease      Gastroparesis      Gastroparesis 7/23/2012     Iron deficiency anemia 7/12/2013     Other chronic pain      Pancreatic disease     history of pancreatitis     Pancreatic divisum      Pancreatic divisum 10/1/2012     Pneumonia, organism unspecified 7/17/2013     PONV (postoperative nausea and vomiting)     Nausea     Portal vein thrombosis 6/29/2013     Recurrent acute pancreatitis 1/17/2013     Sphincter of Oddi dysfunction 1/17/2013     Syncope     X 2     Type 2 diabetes mellitus without complications (H)           Past Surgical History     Past Surgical History:   Procedure Laterality Date     APPENDECTOMY       CHOLECYSTECTOMY       COLONOSCOPY  5/21/2014    Procedure: COMBINED COLONOSCOPY, SINGLE BIOPSY/POLYPECTOMY BY BIOPSY;  Surgeon: Hugo Lind MD;  Location: [facility]     ENDOSCOPIC INSERTION TUBE GASTROSTOMY  2/5/2014    Procedure: ENDOSCOPIC INSERTION TUBE GASTROSTOMY;  Endoscopic percutaneous gastro-jejunostomy tube placement (removal of previous gastrostomy tube)  ;  Surgeon: Sharon Oh MD;  Location: [facility]     ENDOSCOPIC RETROGRADE CHOLANGIOPANCREATOGRAM  9/9/2011    Procedure:ENDOSCOPIC RETROGRADE CHOLANGIOPANCREATOGRAM; Endoscopic Retrograde Cholangiopancreatogram with C Arm, Biliary Sphincterotomy, insertion Bile Duct Stent X 1; Surgeon:SU GONZALES; [facility]     ENDOSCOPIC RETROGRADE CHOLANGIOPANCREATOGRAM  12/15/2011    Procedure:ENDOSCOPIC RETROGRADE CHOLANGIOPANCREATOGRAM; Endoscopic Retrograde Cholangiopancreatogram (c-arm), with pancreatic stent placement,  sphincterotomy; Surgeon:JABIER HEATH; Location:[facility]     ENDOSCOPIC RETROGRADE CHOLANGIOPANCREATOGRAM  6/14/2012    Procedure: ENDOSCOPIC RETROGRADE CHOLANGIOPANCREATOGRAM;  endoscopic retrograde cholangiopancreatogram with pancreatic stent placement  (c-arm);  Surgeon: Jabier Heath MD;  [facility]: [facility]     ESOPHAGOSCOPY, GASTROSCOPY, DUODENOSCOPY (EGD), COMBINED  11/9/2011    Procedure:COMBINED ESOPHAGOSCOPY, GASTROSCOPY, DUODENOSCOPY (EGD); Surgeon:TREE DEAN; Location:[facility]     ESOPHAGOSCOPY, GASTROSCOPY, DUODENOSCOPY (EGD), COMBINED  5/21/2014    Procedure: COMBINED ESOPHAGOSCOPY, GASTROSCOPY, DUODENOSCOPY (EGD), BIOPSY SINGLE OR MULTIPLE;  Surgeon: Hugo Lind MD;  Location: [facility]     HC CHANGE GASTROSTOMY TUBE PERC, WO IMAGING OR ENDO GUIDE N/A 11/25/2014    Procedure: CHANGE GASTROSTOMY TUBE WITHOUT SCOPE;  Surgeon: Sharon Oh MD;  Location: [facility]     HC IMPLANT PERIPH/GASTRIC NEUROSTIM/  1/11/2012    gastric pacemaker     HC REPLACE DUODENOSTOMY/JEJUNOSTOMY TUBE PERCUTANEOUS  2/12/2014    Procedure: REPLACE JEJUNOSTOMY TUBE, PERCUTANEOUS;  Surgeon: Sharon Oh MD;  [facility]: [facility]     HC UGI ENDOSCOPY W EUS  9/7/2011    Procedure:COMBINED ENDOSCOPIC ULTRASOUND, ESOPHAGOSCOPY, GASTROSCOPY, DUODENOSCOPY (EGD); Surgeon:TREE DEAN; [facility]     HC UGI ENDOSCOPY W EUS  12/4/2012    Procedure: COMBINED ENDOSCOPIC ULTRASOUND, ESOPHAGOSCOPY, GASTROSCOPY, DUODENOSCOPY (EGD);  Surgeon: Sharon Oh MD;  Location: [facility]     HC UGI ENDOSCOPY W EUS  5/8/2013    Procedure: COMBINED ENDOSCOPIC ULTRASOUND, ESOPHAGOSCOPY, GASTROSCOPY, DUODENOSCOPY (EGD);  Surgeon: Tree Dean MD;  Location: [facility]     HC UGI ENDOSCOPY W PLACEMENT GASTROSTOMY TUBE PERCUT N/A 2/19/2016    Procedure: COMBINED ESOPHAGOSCOPY, GASTROSCOPY, DUODENOSCOPY (EGD), PLACE PERCUTANEOUS ENDOSCOPIC GASTROSTOMY TUBE;  Surgeon: Sharon Oh MD;  Location: [facility]     LAPAROSCOPIC  IMPLANT GASTRIC STIMULATOR  2012    Procedure:LAPAROSCOPIC IMPLANT GASTRIC STIMULATOR; LAPAROSCOPIC IMPLANT GASTRIC STIMULATOR ; Surgeon:CHONG HART; [facility]     LAPAROSCOPIC SALPINGO-OOPHORECTOMY       LAPAROSCOPIC SALPINGOTOMY      R     LAPAROTOMY EXPLORATORY  2013    Procedure: LAPAROTOMY EXPLORATORY;  Exploratory laparotomy, portal vein declotting with tissue plasminogen activator administration, thrombectomy and embolectomy of portal vein, intraoperative trans-sonic flow monitoring, intraoperative ultrasound;  Surgeon: Rajendra Cruz MD;  Location: [facility]     neurostimulator[  2011     PANCREATECTOMY, TRANSPLANT AUTO ISLET CELL, COMBINED  2013    Procedure: COMBINED PANCREATECTOMY, TRANSPLANT AUTO ISLET CELL;  Total Pancreatectomy, Auto Islet Cell Transplant             Anesthesia General with block;  Surgeon: Rajendra Cruz MD;  Location: [facility]     TUBAL LIGATION             Social History     Patient lives in [place]. She is an ER physician. She reports social alcohol use, denies smoking and illicit drug use.      Family History     Family History   Problem Relation Age of Onset     C.A.D. Father      GI: V, D, anorexia     Blood Disease Father      lymphoma -  from pulmonary fibrosis.     Malignant Hyperthermia Brother      DIABETES Maternal Grandmother      type 2 with older age     DIABETES Maternal Grandfather      type 2 with older age     Cancer - colorectal Maternal Grandfather      DIABETES Paternal Grandmother      type 2 with older age     Connective Tissue Disorder Sister      scleroderma          Allergies     Allergies   Allergen Reactions     Penicillin G Anaphylaxis     Penicillins Anaphylaxis     2013 - pt tolerated ertapenem     Corticosteroids Other (See Comments)     All oral,IV and injectable steroids are contraindicated (unless in life threatening situations) in Islet Auto transplant recipients. They can cause irreversible loss of islet cell  "function. Please contact patients transplant care coordinator LAUREN Gerard RN at /pager   and Endocrinologist prior to administration.     Ertapenem Other (See Comments)     Elevated LFT's  Elevated LFT's     Merrem [Meropenem] Other (See Comments)     Elevated LFTs >3x, happened with invanz too  Elevated LFT's     Other [Seasonal Allergies]      Orange causes rash, NV     Vancomycin Other (See Comments)     osiel syndrome     Vancomycin Other (See Comments)     Gets \"red lizandro syndrome\"     Orange Oil Nausea and Vomiting and Rash          Medications     Prescriptions Prior to Admission   Medication Sig Dispense Refill Last Dose     amitriptyline (ELAVIL) 100 MG tablet Take 1 tablet (100 mg) by mouth At Bedtime 30 tablet 11 6/14/2017 at PM     esomeprazole (NEXIUM) 40 MG capsule Take 1 capsule (40 mg) by mouth 2 times daily Take 30-60 minutes before eating. 90 capsule 3 6/15/2017 at AM     amylase-lipase-protease (CREON) 06763 UNITS CPEP 3 with meals & 2 with snacks (Patient taking differently: 4 with meals & 2 with snacks) 450 capsule 0 6/15/2017 at AM     Insulin Aspart (INSULIN PUMP - OUTPATIENT) 1 unit/hour   6/15/2017 at Unknown time     Probiotic Product (PROBIOTIC DAILY PO) Take 2 tablets by mouth daily   6/14/2017 at Unknown time     Cholecalciferol (VITAMIN D3 PO) Take 8,000 Units by mouth daily    Past Week at Unknown time     ondansetron (ZOFRAN-ODT) 8 MG disintegrating tablet Every 4-6 hours as needed   6/15/2017 at AM     metoclopramide (REGLAN) 10 MG tablet Take 1 tablet by mouth every 6 hours as needed   6/14/2017 at AM     Multiple Vitamins-Minerals (ADEKS) chewable tablet Take 1 tablet by mouth 2 times daily (Patient taking differently: Take 2 tablets by mouth 2 times daily ) 60 tablet 3 6/14/2017 at AM     ACCU-CHEK FASTCLIX LANCETS MISC 1 each every 2 hours as needed. 3 each 3 Unknown     glucose blood VI test strips strip Use 6-8 times daily to check blood glucose " "levels.  (Accu-chek Smartview test strips) 3 Month 3 Unknown     Alcohol Swabs 70 % PADS Use to check blood glucose 6-8 times daily 1 each 3 Unknown     BD SHARPS CONTAINER HOME MISC Use to dispose of used sharps 1 each 0 Unknown     glucose 40 % GEL Take 15 g by mouth every hour as needed. 10 each 1 Unknown     rizatriptan (MAXALT) 10 MG tablet Take 10 mg by mouth at onset of headache    More than a month at Unknown time       amitriptyline  100 mg Oral At Bedtime     amylase-lipase-protease  3 capsule Oral TID w/meals     amylase-lipase-protease  3 tablet Oral TID w/meals     cholecalciferol (vitamin  -D) capsule 8,000 Units  8,000 Units Oral Daily     gabapentin  600 mg Oral At Bedtime     traZODone  100 mg Oral At Bedtime     sodium chloride 0.9%  1,000 mL Intravenous Once           Intake/Output   2L BOLUS in ED       Physical Exam     Vital signs:  Temp: 96.8  F (36  C) Temp src: Oral BP: 121/78 Pulse: 117 Heart Rate: 101 Resp: 21 SpO2: 100 % O2 Device: None (Room air)   Height: 154.9 cm (5' 1\") Weight: 56.7 kg (125 lb)  Estimated body mass index is 23.62 kg/(m^2) as calculated from the following:    Height as of this encounter: 1.549 m (5' 1\").    Weight as of this encounter: 56.7 kg (125 lb).      Gen: alert, tachypnea   NEURO: moving extremities equally, no focal deficits  HEENT:AT/ NC, PERRL b/l,  No scleral icterus   PULM/THORAX: clear breath sounds   CV:RRR, S1 and S2 appreciated, no extra heart sounds, murmurs or rub auscultated. No JVD  ABD: flat, soft, tender in the epigastric region and left quadrant, no guarding or rebound, abdominal scars from prior surgery, gtube in the left quadrant   Rectal:deferred   EXT: warm and well perfused    SKIN: Capillary refill normal,      Labs     Results for orders placed or performed during the hospital encounter of 06/15/17 (from the past 24 hour(s))   Ketone Beta-Hydroxybutyrate Quantitative   Result Value Ref Range    Ketone Quantitative 6.1 (HH) 0.0 - 0.6 " mmol/L   XR Chest Port 1 View    Narrative    EXAMINATION: XR CHEST PORT 1 VW  6/15/2017 10:57 AM      CLINICAL HISTORY: Worsening dyspnea     COMPARISON: 7/19/2013        FINDINGS:  Single frontal view of the chest. Neurostimulator leads projecting  over high/mid thoracic spine. Removal of right IJ central venous  catheter. Surgical clips in partially visualized upper abdomen.    Cardiac silhouette within normal limits. Bronchovascular markings are  unremarkable. No focal airspace opacity. No pleural effusion or  pneumothorax. Partially visualized upper abdomen is unremarkable.        Impression    IMPRESSION:  No acute airspace disease.    I have personally reviewed the examination and initial interpretation  and I agree with the findings.    CUATE KELLER MD   Glucose by meter   Result Value Ref Range    Glucose 340 (H) 70 - 99 mg/dL   ISTAT gases lactate maxwell POCT   Result Value Ref Range    Ph Venous 7.03 (LL) 7.32 - 7.43 pH    PCO2 Venous 20 (L) 40 - 50 mm Hg    PO2 Venous 21 (L) 25 - 47 mm Hg    Bicarbonate Venous 5 (LL) 21 - 28 mmol/L    O2 Sat Venous 19 %    Lactic Acid 1.2 0.7 - 2.1 mmol/L   UA with Microscopic reflex to Culture   Result Value Ref Range    Color Urine Yellow     Appearance Urine Slightly Cloudy     Glucose Urine >1000 (A) NEG mg/dL    Bilirubin Urine Negative NEG    Ketones Urine >150 (A) NEG mg/dL    Specific Gravity Urine 1.024 1.003 - 1.035    Blood Urine Trace (A) NEG    pH Urine 5.5 5.0 - 7.0 pH    Protein Albumin Urine 30 (A) NEG mg/dL    Urobilinogen mg/dL Normal 0.0 - 2.0 mg/dL    Nitrite Urine Negative NEG    Leukocyte Esterase Urine Negative NEG    Source Midstream Urine     WBC Urine 2 0 - 2 /HPF    RBC Urine 8 (H) 0 - 2 /HPF    Yeast Urine Few (A) NEG /HPF    Squamous Epithelial /HPF Urine 16 (H) 0 - 1 /HPF    Transitional Epi <1 0 - 1 /HPF    Mucous Urine Present (A) NEG /LPF   Potassium   Result Value Ref Range    Potassium 3.8 3.4 - 5.3 mmol/L   Glucose by meter   Result  Value Ref Range    Glucose 303 (H) 70 - 99 mg/dL   Glucose by meter   Result Value Ref Range    Glucose 270 (H) 70 - 99 mg/dL   Glucose by meter   Result Value Ref Range    Glucose 220 (H) 70 - 99 mg/dL         Assessment and Plan      Ms. Hart is a 41 year old female with a PMH significant chronic pancreatitis s/p total pancreatectomy and auto islet transplant, gastroparesis, chronic malabsorption, endometriosis who presents with diabetic ketoacidosis.    Neurologic  #Abdominal Pain  Patient has new onset abdominal pain for the last few weeks. Unknown etiology, current pain, most likely secondary to DKA. Not concerned for SBO or partial obstruction given the patient is stooling. Not concerned for a surgical abdomen at this time. Has peg tube.   -PRN oxycodone     Cardiovascular  #Sinus Tachycarida  Likely secondary to hypovolemia in the setting of DKA, discomfort from increased, respiratory drive. EKG with twave abnormalities.  -troponin    Respiratory  #Tachypnea   #Metabolic acidosis with respiratory compensation.   Compensatory in the setting of a metabolic acidosis. Ph. 7.03 on venous blood gas, anion gap 22, bicarb 7, and pCO2 20.   -vbg q2 hours    Gastrointestinal  #Elevated Transaminases   Mild elevation in AST and ALT, unclear etiology. Dx includes alcohol had a glass of wine yesterday, viral, drug, hypoperfusion in setting of DKA. Has history of portal venous thrombosis following pancreatectomy  -us abdomen with doppler  -trend LFTs     #Abdominal Pain   #Nausea  Patient reports a few weeks of abdominal pain, with worsening in the last few days. Likely possible gastroenteritis, versus secondary to DKA currently. No signs of surgical abdomen.   -transplant surgery consult given hx of pancreatectomy and autoislet cell transplant.    #Hx of pancreatectomy s/p islet cell transplant   #Hx of chronic pancreatitis  Patient with history of chronic pancreatitis secondary to pancreatitic s/p pancreatectomy and auto  islet cell transplant in 2013. As a result has diabetes and chronic malabsorption. On insulin pump and taking pancreatic enzymes.  -continue home creon supplements 91215 units with 1-2 tablets with meals      #Gastroparesis w/pacer and peg tube   Patient has gastroparesis s/p laparoscopic placement of gastric pacemaker in 2012. Also has peg tube, which is currently not using.     Tube Feeds  None     Genitourinary  No acute issues     Infectious Disease  No clear sign of infection. Afebrile, normal WBC.    Hematology  No active issues. Cell lines normal     Endocrine  #Type II Diabetes s/p pancreatectomy and auto-islet cell transplant  #DKA   Patient with poorly controlled diabetes s/p autoislet cell transplant. Complicated by gastroparesis and variable gastric output. HgbA1c is 12.8. Has home insulin pump with baseline rate of 1-2units/hr and boluses 6 x daily. Presented with blood glucose of 312, ketosis, ketonuria. Started on insulin drip.   -dka protocol  -insulin drip  -maintenance fluids   -endocrine consult     Renal/Electolytes/FEN   ~Baseline creatinine 0.6-0.8    #Anion Gap Metabolic Acidosis  Secondary to DKA. Bicarb of 7, Anion gap of 22, ketone level of 6.1. Ketonuria  Received fluid resuscitation and initiated on the insulin drip.  -BMP q2 hours  -Ketones q2 hours   -phosphorous q2 hours     #Sodium  Corrected sodium in the setting of hyperglycemia is 138. Will continue to monitor with q2 hours BMP q2 hours    #Potassium  3.4 on presentation to clinic. In the setting of DKA total body potassium stores low, despite normal serum potassium. Will continue to monitor.   -BMP q2 hours       Skin Care  No acute issues     PPX: none     CODE: Full Code     Family:  at bedside     Patient seen and discussed with staff attending, Dr. Jeffery.  Please feel free to page with questions.    Smith Marsh MD  PGY-1  Internal Medicine   [id number]

## 2017-06-15 NOTE — ED PROVIDER NOTES
History     Chief Complaint   Patient presents with     Abnormal Labs     HPI  Beverly Elena is a 41 year old female who presents to the ED with abnormal labs.    Patient is s/p TPIAT (total pancreatectomy with auto islet cell transplant) for debilitating chronic pancreatitis, gastroparesis (s/p gastric pacer), exocrine pancreatic insufficiency (acquired), s/p G tube (not currently using), chronic diarrhea (2/2 malabsorption), asplenia, h/o portal vein thrombosis, GERD, endometriosis who presents to the ED with abnormal lab values. She reports having unpredictable high blood sugars which have been challenging to control since the beginning of this year. Has an insulin pump in that runs at a basal rate of 1-2 units/hr and patient boluses for meals. Overall she feels that her blood sugars are hard to control because of the unpredictable meal absorption due to chronic gastroparesis, diarrhea. More acutely, has not been feeling well over the last 2 weeks. Has noticed decreased appetite, has not been eating well, a couple episodes of vomiting, is nauseous and has periumbilical abdominal pain. Also admits to feeling bloated all the time. No increase in the baseline chronic diarrhea. Denies fever, cough. Has shortness of breath that she first noticed about a week ago. Lives in [place] and is looking to establish care with transplant Endo at the [facility].     I have reviewed the Medications, Allergies, Past Medical and Surgical History, and Social History in the [facility] system.  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) 6/28/2013     Endometriosis      Endometriosis 9/9/2011     Exocrine pancreatic insufficiency  6/28/2013     Gastro-oesophageal reflux disease      Gastroparesis      Gastroparesis 7/23/2012     Iron deficiency anemia 7/12/2013     Other chronic pain      Pancreatic disease     history of pancreatitis     Pancreatic divisum      Pancreatic divisum 10/1/2012     Pneumonia, organism unspecified 7/17/2013     PONV (postoperative nausea and vomiting)     Nausea     Portal vein thrombosis 6/29/2013     Recurrent acute pancreatitis 1/17/2013     Sphincter of Oddi dysfunction 1/17/2013     Syncope     X 2     Type 2 diabetes mellitus without complications (H)        Past Surgical History:   Procedure Laterality Date     APPENDECTOMY       CHOLECYSTECTOMY       COLONOSCOPY  5/21/2014    Procedure: COMBINED COLONOSCOPY, SINGLE BIOPSY/POLYPECTOMY BY BIOPSY;  Surgeon: Hugo Lind MD;  Location: [facility]     ENDOSCOPIC INSERTION TUBE GASTROSTOMY  2/5/2014    Procedure: ENDOSCOPIC INSERTION TUBE GASTROSTOMY;  Endoscopic percutaneous gastro-jejunostomy tube placement (removal of previous gastrostomy tube)  ;  Surgeon: Sharon Oh MD;  Location:  OR     ENDOSCOPIC RETROGRADE CHOLANGIOPANCREATOGRAM  9/9/2011    Procedure:ENDOSCOPIC RETROGRADE CHOLANGIOPANCREATOGRAM; Endoscopic Retrograde Cholangiopancreatogram with C Arm, Biliary Sphincterotomy, insertion Bile Duct Stent X 1; Surgeon:SU HEATH; Location:[facility]     ENDOSCOPIC RETROGRADE CHOLANGIOPANCREATOGRAM  12/15/2011    Procedure:ENDOSCOPIC RETROGRADE CHOLANGIOPANCREATOGRAM; Endoscopic Retrograde Cholangiopancreatogram (c-arm), with pancreatic stent placement, sphincterotomy; Surgeon:SU HEATH; [facility]     ENDOSCOPIC RETROGRADE CHOLANGIOPANCREATOGRAM  6/14/2012    Procedure: ENDOSCOPIC RETROGRADE CHOLANGIOPANCREATOGRAM;  endoscopic retrograde cholangiopancreatogram with pancreatic stent placement  (c-arm);  Surgeon: Su Heath MD;  Location:  OR     ESOPHAGOSCOPY, GASTROSCOPY, DUODENOSCOPY (EGD), COMBINED  11/9/2011     Procedure:COMBINED ESOPHAGOSCOPY, GASTROSCOPY, DUODENOSCOPY (EGD); Surgeon:TREE DEAN; Location:[facility]     ESOPHAGOSCOPY, GASTROSCOPY, DUODENOSCOPY (EGD), COMBINED  5/21/2014    Procedure: COMBINED ESOPHAGOSCOPY, GASTROSCOPY, DUODENOSCOPY (EGD), BIOPSY SINGLE OR MULTIPLE;  Surgeon: Hugo Lind MD;  Location: [facility]     HC CHANGE GASTROSTOMY TUBE PERC, WO IMAGING OR ENDO GUIDE N/A 11/25/2014    Procedure: CHANGE GASTROSTOMY TUBE WITHOUT SCOPE;  Surgeon: Sharon Oh MD;  Location: [facility]     HC IMPLANT PERIPH/GASTRIC NEUROSTIM/  1/11/2012    gastric pacemaker     HC REPLACE DUODENOSTOMY/JEJUNOSTOMY TUBE PERCUTANEOUS  2/12/2014    Procedure: REPLACE JEJUNOSTOMY TUBE, PERCUTANEOUS;  Surgeon: Sharon Oh MD;  Location: [facility]     HC UGI ENDOSCOPY W EUS  9/7/2011    Procedure:COMBINED ENDOSCOPIC ULTRASOUND, ESOPHAGOSCOPY, GASTROSCOPY, DUODENOSCOPY (EGD); Surgeon:TREE DEAN; Location:[facility]     HC UGI ENDOSCOPY W EUS  12/4/2012    Procedure: COMBINED ENDOSCOPIC ULTRASOUND, ESOPHAGOSCOPY, GASTROSCOPY, DUODENOSCOPY (EGD);  Surgeon: Sharon Oh MD;  Location: [facility]     HC UGI ENDOSCOPY W EUS  5/8/2013    Procedure: COMBINED ENDOSCOPIC ULTRASOUND, ESOPHAGOSCOPY, GASTROSCOPY, DUODENOSCOPY (EGD);  Surgeon: Tree Dean MD;  Location: [facility]     HC UGI ENDOSCOPY W PLACEMENT GASTROSTOMY TUBE PERCUT N/A 2/19/2016    Procedure: COMBINED ESOPHAGOSCOPY, GASTROSCOPY, DUODENOSCOPY (EGD), PLACE PERCUTANEOUS ENDOSCOPIC GASTROSTOMY TUBE;  Surgeon: Sharon Oh MD;  [facility]: [facility]     LAPAROSCOPIC IMPLANT GASTRIC STIMULATOR  1/11/2012    Procedure:LAPAROSCOPIC IMPLANT GASTRIC STIMULATOR; LAPAROSCOPIC IMPLANT GASTRIC STIMULATOR ; Surgeon:CHONG HART; [facility]     LAPAROSCOPIC SALPINGO-OOPHORECTOMY       LAPAROSCOPIC SALPINGOTOMY      R     LAPAROTOMY EXPLORATORY  6/29/2013    Procedure: LAPAROTOMY EXPLORATORY;  Exploratory laparotomy, portal vein declotting with tissue  plasminogen activator administration, thrombectomy and embolectomy of portal vein, intraoperative trans-sonic flow monitoring, intraoperative ultrasound;  Surgeon: Rajendra Cruz MD;  Location: [facility]     neurostimulator[  2011     PANCREATECTOMY, TRANSPLANT AUTO ISLET CELL, COMBINED  2013    Procedure: COMBINED PANCREATECTOMY, TRANSPLANT AUTO ISLET CELL;  Total Pancreatectomy, Auto Islet Cell Transplant             Anesthesia General with block;  Surgeon: Rajendra Cruz MD;  Location: [facility]     TUBAL LIGATION         Family History   Problem Relation Age of Onset     C.A.D. Father      GI: V, D, anorexia     Blood Disease Father      lymphoma -  from pulmonary fibrosis.     Malignant Hyperthermia Brother      DIABETES Maternal Grandmother      type 2 with older age     DIABETES Maternal Grandfather      type 2 with older age     Cancer - colorectal Maternal Grandfather      DIABETES Paternal Grandmother      type 2 with older age     Connective Tissue Disorder Sister      scleroderma       Social History   Substance Use Topics     Smoking status: Never Smoker     Smokeless tobacco: Never Used     Alcohol use No         Review of Systems   Constitutional: Positive for appetite change. Negative for chills, diaphoresis and fever.   HENT: Negative for hearing loss, rhinorrhea and sore throat.    Eyes: Negative for discharge, redness and visual disturbance.   Respiratory: Positive for shortness of breath. Negative for cough.    Cardiovascular: Negative for chest pain and leg swelling.   Gastrointestinal: Positive for abdominal pain, diarrhea (chronic), nausea and vomiting. Negative for constipation.   Genitourinary: Negative for dysuria and hematuria.   Musculoskeletal: Negative for arthralgias and myalgias.   Skin: Negative for rash.   Neurological: Positive for dizziness, light-headedness and headaches. Negative for syncope.       Physical Exam   BP: (!) 139/92  Pulse: 117  Temp: 97.4  F (36.3  C)  Resp:  "24  Height: 154.9 cm (5' 1\")  Weight: 54.4 kg (120 lb)  Physical Exam   Constitutional: She appears well-developed. She appears distressed.   HENT:   Head: Normocephalic and atraumatic.   Right Ear: External ear normal.   Left Ear: External ear normal.   Eyes: Conjunctivae and EOM are normal. Pupils are equal, round, and reactive to light. No scleral icterus.   Neck: Normal range of motion.   Cardiovascular: Regular rhythm and normal heart sounds.  Tachycardia present.    Pulmonary/Chest: Breath sounds normal. Tachypnea noted. She has no wheezes. She has no rales.   Abdominal: Soft. Bowel sounds are normal. She exhibits distension. There is tenderness in the periumbilical area. There is no rebound and no guarding. A hernia (midline, epigastric) is present.       Musculoskeletal: Normal range of motion. She exhibits no edema.   Neurological: She is alert. No cranial nerve deficit.   Psychiatric: She has a normal mood and affect. Her speech is normal and behavior is normal.       ED Course     ED Course     Procedures             Critical Care time:  none        Labs Ordered and Resulted from Time of ED Arrival Up to the Time of Departure from the ED   ROUTINE UA WITH MICROSCOPIC REFLEX TO CULTURE - Abnormal; Notable for the following:        Result Value    Glucose Urine >1000 (*)     Ketones Urine >150 (*)     Blood Urine Trace (*)     Protein Albumin Urine 30 (*)     RBC Urine 8 (*)     Yeast Urine Few (*)     Squamous Epithelial /HPF Urine 16 (*)     Mucous Urine Present (*)     All other components within normal limits   KETONE BETA-HYDROXYBUTYRATE QUANTITATIVE - Abnormal; Notable for the following:     Ketone Quantitative 6.1 (*)     All other components within normal limits   GLUCOSE BY METER - Abnormal; Notable for the following:     Glucose 340 (*)     All other components within normal limits   GLUCOSE BY METER - Abnormal; Notable for the following:     Glucose 303 (*)     All other components within normal " limits   GLUCOSE BY METER - Abnormal; Notable for the following:     Glucose 270 (*)     All other components within normal limits   GLUCOSE BY METER - Abnormal; Notable for the following:     Glucose 220 (*)     All other components within normal limits   ISTAT  GASES LACTATE CRISTIAN POCT - Abnormal; Notable for the following:     Ph Venous 7.03 (*)     PCO2 Venous 20 (*)     PO2 Venous 21 (*)     Bicarbonate Venous 5 (*)     All other components within normal limits   POTASSIUM   GLUCOSE MONITOR NURSING POCT   GLUCOSE MONITOR NURSING POCT   GLUCOSE MONITOR NURSING POCT   BASIC METABOLIC PANEL   CBC WITH PLATELETS DIFFERENTIAL   BLOOD GAS ARTERIAL AND OXYHGB   KETONE BETA-HYDROXYBUTYRATE QUANTITATIVE   OSMOLALITY   PHOSPHORUS   MAGNESIUM   HEPATIC PANEL   LACTIC ACID WHOLE BLOOD   AMYLASE   ROUTINE UA WITH MICROSCOPIC REFLEX TO CULTURE   ISTAT CG4 GASES LACTATE CRISTAIN NURSING POCT   ASSESS FOR HYPOGLYCEMIA SYMPTOMS   IP ASSIGN PROVIDER TEAM TO TREATMENT TEAM   VITAL SIGNS   NEURO CHECKS   INTAKE AND OUTPUT   CARDIAC CONTINUOUS MONITORING   TELEMETRY MONITORING CARDIOLOGY ADULT   NOTIFY PHYSICIAN   PATIENT EDUCATION   ASSESS FOR HYPOGLYCEMIA SYMPTOMS   NO INDWELLING URINARY CATHETER (WOLF) PRESENT OR NEEDED   BLADDER SCAN   APPLY PNEUMATIC COMPRESSION DEVICE (PCD)   NONE OF THE ABOVE CORE MEASURE DIAGNOSES   BLOOD CULTURE            Assessments & Plan (with Medical Decision Making)   Patient is s/p TPIAT for debilitating chronic pancreatitis, gastroparesis (s/p gastric pacer), exocrine pancreatic insufficiency (acquired), s/p G tube (not currently using), chronic diarrhea (2/2 malabsorption), asplenia, h/o portal vein thrombosis, GERD, endometriosis who presented to the ED with abnormal lab values concerning for DKA (ph 7.03, anion gap 22, bicarb 7, blood glucose >300). Patient has a complicated medical history as listed above and it appears that her blood sugars have been challenging to control due to her underlying  gastroparesis, malabsorption. Patient was hydrated with NS, started on IV insulin infusion and stabilized before being transferred to the floor for further management of her DKA.     I have reviewed the nursing notes.    I have reviewed the findings, diagnosis, plan and need for follow up with the patient.    Current Discharge Medication List          Final diagnoses:   Diabetic ketoacidosis without coma associated with diabetes mellitus due to underlying condition (H)       6/15/2017   [facility], [place], EMERGENCY DEPARTMENT    Attending addendum:    This data collected with the Resident working in the Emergency Department.  Patient was seen and evaluated by myself and I repeated the history and physical exam with the patient.  The plan of care was discussed with them.  The key portions of the note including the entire assessment and plan reflect my documentation.    GEN:  Alert, well developed, is tachypneic and appears uncomfortable  HEENT:  PERRL, EOMI, Mucous membranes are dry.   Cardio:  Regular tachycardia, no murmur, radial pulses equal bilaterally  PULM:  Lungs clear, good air movement, no wheezes, rales   Abd:  Soft, normal bowel sounds, there is mild periumbilical tenderness, non-distended, no percussion tenderness, insulin pump is not connected, feeding tube also present, but not in use.   Back exam:  No CVA tenderness  Musculoskeletal:  normal range of motion, no lower extremity swelling or calf tenderness  Neuro:  Alert and oriented X3, Follows commands, moving all extremities spontaneously   Skin:  Warm, dry      The patient's chart was reviewed and her labs that were done earlier today at the endocrine clinic were reviewed.  Her hemoglobin A1c from this morning is 12.8, comprehensive metabolic panel shows normal sodium and potassium chloride, but her bicarbonate is very low at 7, anion gap is 22, glucose is 312, creatinine and BUN are normal, the BUN being 7. Calcium is 8.4, alkaline phosphatase is 142  which is normal, ALT is 384, so it s elevated, and AST is also elevated at 256. Total bilirubin is normal at 0.7.  CBC from this morning is normal with a normal hemoglobin, normal platelets, and normal white count of 9.9. In the ED serum ketones were done and these were significantly elevated at 6.1. The patient s glucose was monitored throughout the Emergency Department course. Potassium was rechecked and it is normal at 3.8.  UA was done in the ED which shows greater than 1000 mg/dl of glucose, greater than 150 mg/dl of ketones, 8 red cells are present, and 16 squamous epithelial cells are present, and there are 2 white cells per high-power field. The patient was given 2 L of normal saline and then followed by a normal saline infusion. She was given IV morphine for pain IV Zofran for nausea.  The patient was started on an insulin drip to treat for DKA.  Heart rate decreased by only a small amount to around 100.  Respiratory rate improved but only to about 20. Oxygen levels remained normal at around 100%.  She was not hypotensive.  Blood pressure remained normal throughout the Emergency Department course.  A VBG revealed normal lactic acid of 1.2; however, the venous bicarb level was 5, pO2 21, pCO2 was 20, and a venous pH was 7.03.  The resident talked to Medicine about admitting the patient to Medicine; however, given this low pH and low bicarb levels, disposition ultimately was the ICU.    The patient is critically ill and will need aggressive resuscitation and close monitoring.  She ll be admitted to the ICU for DKA.    This part of the document was transcribed by Gamaliel Hilliard for Smita Wayne MD.       Smita Wayne MD  06/15/17 [id number]

## 2017-06-15 NOTE — IP AVS SNAPSHOT
[address] [address]    Phone:  [phone]                                       After Visit Summary   6/15/2017    Beverly Elena    MRN: [id number]           After Visit Summary Signature Page     I have received my discharge instructions, and my questions have been answered. I have discussed any challenges I see with this plan with the nurse or doctor.    ..........................................................................................................................................  Patient/Patient Representative Signature      ..........................................................................................................................................  Patient Representative Print Name and Relationship to Patient    ..................................................               ................................................  Date                                            Time    ..........................................................................................................................................  Reviewed by Signature/Title    ...................................................              ..............................................  Date                                                            Time

## 2017-06-15 NOTE — IP AVS SNAPSHOT
MRN:[id number]                      After Visit Summary   6/15/2017    Beverly Elena    MRN: [id number]           Thank you!     Thank you for choosing [facility] for your care. Our goal is always to provide you with excellent care. Hearing back from our patients is one way we can continue to improve our services. Please take a few minutes to complete the written survey that you may receive in the mail after you visit with us. Thank you!        Patient Information     Date Of Birth          1975        Designated Caregiver       Most Recent Value    Caregiver    Will someone help with your care after discharge? no      About your hospital stay     You were admitted on:  Nataly 15, 2017 You last received care in the:  [address] [facility] [place]    You were discharged on:  June 18, 2017        Reason for your hospital stay       Hyperglycemia, diabetic ketoacidosis, nausea, vomiting, abdominal pain.                  Who to Call     For medical emergencies, please call 911.  For non-urgent questions about your medical care, please call your primary care provider or clinic, [phone]          Attending Provider     Provider Specialty    Smita Wayne MD Emergency Medicine    JefferyAditi rowan MD Pulmonary    Yolanda Ang MD Internal Medicine       Primary Care Provider Office Phone # Fax #    Marleny Aravapalli [phone]      After Care Instructions     Activity       Your activity upon discharge: activity as tolerated            Diet       Follow this diet upon discharge: Orders Placed This Encounter      Consistent Carbohydrate Diet 3959-5229 Calories: Moderate Consistent CHO (4-6 CHO units/meal)                  Follow-up Appointments     Adult [facility] Follow-up and recommended labs and tests       You should follow up with your primary care provider within two weeks in order to follow up on your liver function tests.     Appointments on [place]  "and/or [facility] (with [facility] or [facility] provider or service). Call [phone] if you haven't heard regarding these appointments within 7 days of discharge.            Follow Up and recommended labs and tests       Follow up with primary care provider, Marleny Hathaway, within 7 days for hospital follow- up.  The following labs/tests are recommended: CMP.                  Pending Results     Date and Time Order Name Status Description    6/15/2017 1757 Blood culture Preliminary     6/15/2017 1425 EKG 12-lead, tracing only - EKG Preliminary     6/15/2017 0755 FOLATE RBC In process     6/15/2017 0755 VITAMIN E In process     6/15/2017 0755 VITAMIN A In process             Statement of Approval     Ordered          06/18/17 1126  I have reviewed and agree with all the recommendations and orders detailed in this document.  EFFECTIVE NOW     Approved and electronically signed by:  Benedict Davenport MD             Admission Information     Date & Time Provider Department Dept. Phone    6/15/2017 Yolanda Ang MD [address] [facility] [place] [phone]      Your Vitals Were     Blood Pressure Pulse Temperature Respirations Height Weight    93/52 (BP Location: Left arm) 117 97  F (36.1  C) (Oral) 16 1.549 m (5' 1\") 56.7 kg (125 lb)    Last Period Pulse Oximetry BMI (Body Mass Index)             (LMP Unknown) 98% 23.62 kg/m2         MyChart Information     [facility] gives you secure access to your electronic health record. If you see a primary care provider, you can also send messages to your care team and make appointments. If you have questions, please call your primary care clinic.  If you do not have a primary care provider, please call [phone] and they will assist you.        Care EveryWhere ID     This is your Care EveryWhere ID. This could be used by other organizations to access your [facility] medical records  [id number]           Review of your medicines      CONTINUE these medicines which may have CHANGED, or " have new prescriptions. If we are uncertain of the size of tablets/capsules you have at home, strength may be listed as something that might have changed.        Dose / Directions    adeks chewable tablet   This may have changed:  how much to take   Used for:  Pancreatic insufficiency        Dose:  1 tablet   Take 1 tablet by mouth 2 times daily   Quantity:  60 tablet   Refills:  3       amylase-lipase-protease 70018 UNITS Cpep per EC capsule   Commonly known as:  CREON   This may have changed:  additional instructions   Used for:  Pancreatic insufficiency        3 with meals & 2 with snacks   Quantity:  450 capsule   Refills:  0         CONTINUE these medicines which have NOT CHANGED        Dose / Directions    alcohol swab prep pads   Used for:  Chronic pancreatitis (H)        Use to check blood glucose 6-8 times daily   Quantity:  1 each   Refills:  3       amitriptyline 100 MG tablet   Commonly known as:  ELAVIL   Used for:  Chronic pancreatitis (H), Acute post-operative pain, Chronic abdominal pain, Encounter for long-term opiate analgesic use        Dose:  100 mg   Take 1 tablet (100 mg) by mouth At Bedtime   Quantity:  30 tablet   Refills:  11       BD SHARPS CONTAINER HOME Misc   Used for:  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  (Accu-chek Smartview test strips)   Quantity:  3 Month   Refills:  3       esomeprazole 40 MG CR capsule   Commonly known as:  nexIUM   Used for:  Diarrhea        Dose:  40 mg   Take 1 capsule (40 mg) by mouth 2  times daily Take 30-60 minutes before eating.   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   Quantity:  10 each   Refills:  1       INSULIN PUMP - OUTPATIENT        1 unit/hour   Refills:  0       MAXALT 10 MG tablet   Used for:  Sphincter of Oddi dysfunction, Pancreatic divisum, Recurrent acute pancreatitis   Generic drug:  rizatriptan        Dose:  10 mg   Take 10 mg by mouth at onset of headache   Refills:  0       ondansetron 8 MG ODT tab   Commonly known as:  ZOFRAN-ODT        Every 4-6 hours as needed   Refills:  0       PROBIOTIC DAILY PO        Dose:  2 tablet   Take 2 tablets by mouth daily   Refills:  0       REGLAN 10 MG tablet   Generic drug:  metoclopramide        Dose:  1 tablet   Take 1 tablet by mouth every 6 hours as needed   Refills:  0       VITAMIN D3 PO        Dose:  8000 Units   Take 8,000 Units by mouth daily   Refills:  0                Protect others around you: Learn how to safely use, store and throw away your medicines at www.disposemymeds.org.             Medication List: This is a list of all your medications and when to take them. Check marks below indicate your daily home schedule. Keep this list as a reference.      Medications           Morning Afternoon Evening Bedtime As Needed    adeks chewable tablet   Take 1 tablet by mouth 2 times daily                                alcohol swab prep pads   Use to check blood glucose 6-8 times daily                                amitriptyline 100 MG tablet   Commonly known as:  ELAVIL   Take 1 tablet (100 mg) by mouth At Bedtime   Last time this was given:  100 mg on 6/17/2017 11:47 PM                                amylase-lipase-protease 28794 UNITS Cpep per EC capsule   Commonly known as:  CREON   3 with meals & 2 with snacks   Last time this was given:  72,000 Units on 6/18/2017  8:09 AM                                 BD SHARPS CONTAINER HOME Misc   Use to dispose of used sharps                                blood glucose monitoring lancets   1 each every 2 hours as needed.                                blood glucose monitoring test strip   Commonly known as:  no brand specified   Use 6-8 times daily to check blood glucose levels.  (Accu-chek Smartview test strips)                                esomeprazole 40 MG CR capsule   Commonly known as:  nexIUM   Take 1 capsule (40 mg) by mouth 2 times daily Take 30-60 minutes before eating.                                glucose 40 % Gel gel   Take 15 g by mouth every hour as needed.                                INSULIN PUMP - OUTPATIENT   1 unit/hour                                MAXALT 10 MG tablet   Take 10 mg by mouth at onset of headache   Generic drug:  rizatriptan                                ondansetron 8 MG ODT tab   Commonly known as:  ZOFRAN-ODT   Every 4-6 hours as needed                                PROBIOTIC DAILY PO   Take 2 tablets by mouth daily                                REGLAN 10 MG tablet   Take 1 tablet by mouth every 6 hours as needed   Generic drug:  metoclopramide                                VITAMIN D3 PO   Take 8,000 Units by mouth daily   Last time this was given:  8,000 Units on 6/18/2017  8:09 AM

## 2017-06-15 NOTE — H&P
[facility]      MICU History and Physicial  Beverly Elena MRN: [id number]  1975  Date of Admission:6/15/2017  Primary care provider: Marleny Hathaway      Assessment and Plan:     Beverly Elena is a 40 yo female with PMHx of chronic relapsing pancreatitis d/t pancreatic divisum s/p TPAIT in 2013, gastroparesis (s/p gastric pace maker), s/p G-tube placement, portal vein thrombosis, GERD, and endometriosis admitted to the MICU on 6/15 for management of DKA.    PLAN:  ===NEURO/PSYCH===  # Sedation  None    # Pain control  Improving; has not needed PRN Oxycodone O/N.  - Oxycodone 30mg q4hrs PRN    #Headaches - Chronic  - Continue PTA amitriptyline     ===CARDIOVASCULAR===  # Non-specific T-wave inversion  New findings on EKG specifically in anterolateral leads. With tachycardia on admission, but resolved. Troponins negative.     ===PULMONARY===  # Dyspnea - Resolved  Likely related to abdominal pain and situational anxiety. Also compensatory from anion-gap metabolic acidosis. Now satting at 100% O2 room air.  - Continuous SpO2 monitoring.    ===GASTROINTESTINAL===  # Periumbilical abdominal pain - Improved  - Likely related to DKA. Abdominal exam without evidence of acute abdomen today. No evidence of obstruction on abdominal XR. Considered underlying SBO (given high degree of intraabdominal surgery), mesenteric ischemia, abscess/infection of G-tube (less likely since afebrile, nml WBC and lactate).    # Transaminitis - Improving:  AST/ALT: 256/384 --> 129/246. Unclear etiology at this time, less likely to be related to DKA. Pt with a prior history of portal vein thrombosis, not current anticoagulated. With abdominal U/S without evidence of new thrombosis. DDx includes recurrence of portal vein thrombosis, malignancy (given weight loss and fatigue over past 2 months), infectious (cholecystitis, hepatitis, mononucleosis, hepatic abscess, etc) less likely as afebrile and without  leukocytosis). Less likely biliary related given normal alk phos, T. Bili.  - Check hepatitis serology today    # s/p TPAIT (2013) 2/2 chronic pancreatitis  - Transplant surgery consulted, appreciated recs. No acute surgical intervention at this time.  - Continue PTA Amylase/Lipase/Protease 81598U  - PTA Vit D3 8000U PO   - Zofran q4-6hrs PRN  - Metoclopramide 10mg q6hrs PRN    # Gastroparesis - Chronic  S/p gastric pacer placement in 2013.    # Nutrition:   - Continue NPO (d/t gastroparesis)    ===RENAL===  # Anion gap metabolic acidosis - Resolved  Caused by DKA. Anion gap now closed (11).  - Continue DKA protocol as below in endocrine  - BMP, Mg, Phos q2hrs    # Non-anion gap metabolic acidosis - new  Likely related to hyperchloremia 2/2 to fluid resuscitation for DKA treatment. Other etiologies include chronic state d/t diarrhea, Type RTA (though less likely with normal Cr).  - Urine Na, Cl, Osm today  - Will continue to monitor    Fluids: D5+1/2NS @ 250cc/hr + 30mEq KCl    ===HEME/ONC===  # No active issues at this time    ===ENDOCRINE===  # DKA  Etiology most likely d/t poor glucose control. HgbA1c on today's outpt appointment was 12.8. s/p 2L NS bolus in ED, was started on insulin gtt since K>3.3, no additional K replacement needed.  - Continue insulin gtt @ 0.1 U/kg/hr, will transition to 0.02-0.05 U/kg/hr SubQ once AG<12.  - 1/2 NS @ 250 cc/hr + 30 mEq K  - Glucose checks q1hr  - K checks q2hrs  - Will consult Endocrinology    ===INFECTIOUS DISEASE===  # No active issues at this time  - Reflex UCx pending    # Antimicrobials:  None    ===SKIN/MSK===  # No active issues at this time    LINES: PIV x 2, G-tube (inactive, closed)    Prophylaxis:  DVT: SCD   GI: None  Family:  Updated at the bedise  Disposition: Critically Ill  Code Status: FULL    Patient was seen and discussed with attending physician Dr. Jeffery, who agrees with above assessment and plan.    Antonio Merchant, MS4  [facility] Medical Student  P:  [phone]         Chief Complaint:   Elevated blood glucose, LFTs, and anion gap metabolic acidosis.         History of Present Illness:   Beverly Elena is a 42 yo female with PMHx of chronic relapsing pancreatitis d/t pancreatic divisum s/p TPAIT in 2013, gastroparesis (s/p gastric pace maker), s/p G-tube placement, portal vein thrombosis, GERD, and endometriosis admitted to the MICU on 6/15 for management of DKA. Pt states that over the past two months she has been having difficult blood glucose control. Some of her checks have ranged upwards to the 600's, stating that her sugars are difficult to predict how high they will become given her history of gastroparesis and chronic diarrhea. She has an insulin pump that normally runs at a 1-2U basal rate and boluses 6-8x/day for meals. Of note, she has had an issue with the insulin pump this week, dropping it down a flight on concrete stairs. She doesn't believe there was an issue with the pump function as it was still giving her insulin boluses.    Says that she has been feeling poorly over the past 2 months and more acutely over the past 2 weeks. Complains of a sharp, periumbilical pain that radiates L>R. She has had a diminished appetite and has lost 14 lbs over the past 2 months. She has chronic nausea and has vomited several times over past week. She normally has upwards of 20 BM/day of greasy stools and noticed frequency has decreased and have become more formed. Her last BM was 2 days ago that she describes as normal. She endorses increased intraabdominal bloating. She denies any streaky bloody stools or melena. She denies any fevers. She endorses headaches and lightheadedness, but no syncope or LOC. She denies any chest pain, but endorses dyspnea which was worsened this week. She does not have dysuria, flank pain, or hematuria.    In regards to her chronic GI issues, pt has had prior care with endocrinology (Dr. Henson) and with transplant surgery (Dr. Cruz). Pt  "has gastric pacer in place, also with G-tube that has not been utilized in over a year. States that she occasionally vents the G-tube. She has not had an issues with infection or irritation around the G-tube site. She states that the G-tube itself sits in an \"opening\" within the abdominal wall and \"needs to get it fixed.\"         Review of Systems:   ROS: 10 point ROS neg other than the symptoms noted above in the HPI.         Past Medical History:   Medical History reviewed.   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) 6/28/2013     Endometriosis      Endometriosis 9/9/2011     Exocrine pancreatic insufficiency 6/28/2013     Gastro-oesophageal reflux disease      Gastroparesis      Gastroparesis 7/23/2012     Iron deficiency anemia 7/12/2013     Other chronic pain      Pancreatic disease     history of pancreatitis     Pancreatic divisum      Pancreatic divisum 10/1/2012     Pneumonia, organism unspecified 7/17/2013     PONV (postoperative nausea and vomiting)     Nausea     Portal vein thrombosis 6/29/2013     Recurrent acute pancreatitis 1/17/2013     Sphincter of Oddi dysfunction 1/17/2013     Syncope     X 2     Type 2 diabetes mellitus without complications (H)              Past Surgical History:   Surgical History reviewed.   Past Surgical History:   Procedure Laterality Date     APPENDECTOMY       CHOLECYSTECTOMY       COLONOSCOPY  5/21/2014    Procedure: COMBINED COLONOSCOPY, SINGLE BIOPSY/POLYPECTOMY BY BIOPSY;  Surgeon: Hugo Lind MD;  Location:  GI     ENDOSCOPIC INSERTION TUBE GASTROSTOMY  2/5/2014    Procedure: ENDOSCOPIC INSERTION TUBE GASTROSTOMY;  Endoscopic percutaneous gastro-jejunostomy tube placement " (removal of previous gastrostomy tube)  ;  Surgeon: Sharon Oh MD;  [facility]     ENDOSCOPIC RETROGRADE CHOLANGIOPANCREATOGRAM  9/9/2011    Procedure:ENDOSCOPIC RETROGRADE CHOLANGIOPANCREATOGRAM; Endoscopic Retrograde Cholangiopancreatogram with C Arm, Biliary Sphincterotomy, insertion Bile Duct Stent X 1; Surgeon:JABIER HEATH; Location:[facility]     ENDOSCOPIC RETROGRADE CHOLANGIOPANCREATOGRAM  12/15/2011    Procedure:ENDOSCOPIC RETROGRADE CHOLANGIOPANCREATOGRAM; Endoscopic Retrograde Cholangiopancreatogram (c-arm), with pancreatic stent placement, sphincterotomy; Surgeon:JABIER HEATH; Location:[facility]     ENDOSCOPIC RETROGRADE CHOLANGIOPANCREATOGRAM  6/14/2012    Procedure: ENDOSCOPIC RETROGRADE CHOLANGIOPANCREATOGRAM;  endoscopic retrograde cholangiopancreatogram with pancreatic stent placement  (c-arm);  Surgeon: Jabier Heath MD;  [facility]: [facility]     ESOPHAGOSCOPY, GASTROSCOPY, DUODENOSCOPY (EGD), COMBINED  11/9/2011    Procedure:COMBINED ESOPHAGOSCOPY, GASTROSCOPY, DUODENOSCOPY (EGD); Surgeon:TANIKA MOHAN; Location:[facility]     ESOPHAGOSCOPY, GASTROSCOPY, DUODENOSCOPY (EGD), COMBINED  5/21/2014    Procedure: COMBINED ESOPHAGOSCOPY, GASTROSCOPY, DUODENOSCOPY (EGD), BIOPSY SINGLE OR MULTIPLE;  Surgeon: Hugo Lind MD;  Location: [facility]     HC CHANGE GASTROSTOMY TUBE PERC, WO IMAGING OR ENDO GUIDE N/A 11/25/2014    Procedure: CHANGE GASTROSTOMY TUBE WITHOUT SCOPE;  Surgeon: Sharon Oh MD;  Location: [facility]     HC IMPLANT PERIPH/GASTRIC NEUROSTIM/  1/11/2012    gastric pacemaker     HC REPLACE DUODENOSTOMY/JEJUNOSTOMY TUBE PERCUTANEOUS  2/12/2014    Procedure: REPLACE JEJUNOSTOMY TUBE, PERCUTANEOUS;  Surgeon: Sharon Oh MD;  [facility]: [facility]     HC UGI ENDOSCOPY W EUS  9/7/2011    Procedure:COMBINED ENDOSCOPIC ULTRASOUND, ESOPHAGOSCOPY, GASTROSCOPY, DUODENOSCOPY (EGD); Surgeon:TANIKA MOHAN; [facility]     HC UGI ENDOSCOPY W EUS  12/4/2012     Procedure: COMBINED ENDOSCOPIC ULTRASOUND, ESOPHAGOSCOPY, GASTROSCOPY, DUODENOSCOPY (EGD);  Surgeon: Sharon Oh MD;  Location: [facility]     HC UGI ENDOSCOPY W EUS  2013    Procedure: COMBINED ENDOSCOPIC ULTRASOUND, ESOPHAGOSCOPY, GASTROSCOPY, DUODENOSCOPY (EGD);  Surgeon: Tree Dean MD;  Location: [facility]     HC UGI ENDOSCOPY W PLACEMENT GASTROSTOMY TUBE PERCUT N/A 2016    Procedure: COMBINED ESOPHAGOSCOPY, GASTROSCOPY, DUODENOSCOPY (EGD), PLACE PERCUTANEOUS ENDOSCOPIC GASTROSTOMY TUBE;  Surgeon: Sharon Oh MD;  [facility]: [facility]     LAPAROSCOPIC IMPLANT GASTRIC STIMULATOR  2012    Procedure:LAPAROSCOPIC IMPLANT GASTRIC STIMULATOR; LAPAROSCOPIC IMPLANT GASTRIC STIMULATOR ; Surgeon:CHONG HART; Location[facility]     LAPAROSCOPIC SALPINGO-OOPHORECTOMY       LAPAROSCOPIC SALPINGOTOMY      R     LAPAROTOMY EXPLORATORY  2013    Procedure: LAPAROTOMY EXPLORATORY;  Exploratory laparotomy, portal vein declotting with tissue plasminogen activator administration, thrombectomy and embolectomy of portal vein, intraoperative trans-sonic flow monitoring, intraoperative ultrasound;  Surgeon: Rajendra Cruz MD;  Location: [facility]     neurostimulator[  2011     PANCREATECTOMY, TRANSPLANT AUTO ISLET CELL, COMBINED  2013    Procedure: COMBINED PANCREATECTOMY, TRANSPLANT AUTO ISLET CELL;  Total Pancreatectomy, Auto Islet Cell Transplant             Anesthesia General with block;  Surgeon: Rajendra Cruz MD;  Location: [facility]     TUBAL LIGATION               Social History:   Social History reviewed.  Social History   Substance Use Topics     Smoking status: Never Smoker     Smokeless tobacco: Never Used     Alcohol use No             Family History:   Family History reviewed.   Family History   Problem Relation Age of Onset     C.A.D. Father      GI: V, D, anorexia     Blood Disease Father      lymphoma -  from pulmonary fibrosis.     Malignant Hyperthermia Brother      DIABETES Maternal  Grandmother      type 2 with older age     DIABETES Maternal Grandfather      type 2 with older age     Cancer - colorectal Maternal Grandfather      DIABETES Paternal Grandmother      type 2 with older age     Connective Tissue Disorder Sister      scleroderma             Allergies:     Allergies   Allergen Reactions     Penicillins Anaphylaxis     06/2013 - pt tolerated ertapenem     Corticosteroids Other (See Comments)     All oral,IV and injectable steroids are contraindicated (unless in life threatening situations) in Islet Auto transplant recipients. They can cause irreversible loss of islet cell function. Please contact patients transplant care coordinator LAUREN Gerard RN at /pager   and Endocrinologist prior to administration.     Ertapenem      Elevated LFT's     Merrem [Meropenem]      Elevated LFT's     Other [Seasonal Allergies]      Orange causes rash, NV     Vancomycin Other (See Comments)     osiel syndrome             Medications:   Medications Reviewed.   Current Facility-Administered Medications   Medication     glucose 40 % gel 15-30 g    Or     dextrose 50 % injection 25-50 mL    Or     glucagon injection 1 mg     insulin 1 unit/mL in saline (NovoLIN, HumuLIN Regular) drip - ADULT IV Infusion     amitriptyline (ELAVIL) tablet 100 mg     amylase-lipase-protease (CREON 24) 34970 UNITS per EC capsule 72,000 Units     cholecalciferol (vitamin  -D) 3,000 Units, cholecalciferol (vitamin D3) 5,000 Units     oxyCODONE (ROXICODONE) IR tablet 30 mg     0.9% sodium chloride BOLUS     glucose 40 % gel 15-30 g    Or     dextrose 50 % injection 25-50 mL    Or     glucagon injection 1 mg     NaCl 0.45 % 1,000 mL with potassium chloride 30 mEq/L infusion     insulin 1 unit/1mL in saline (NovoLIN, HumuLIN Regular) drip - DKA algorithm     ondansetron (ZOFRAN) injection 4 mg     dextrose 5% and 0.45% NaCl + KCl 30 mEq/L infusion             Physical Exam:   Vitals were reviewed.  Blood  "pressure 120/77, pulse 117, temperature 97.7  F (36.5  C), temperature source Axillary, resp. rate 22, height 1.549 m (5' 1\"), weight 56.7 kg (125 lb), SpO2 100 %.    General: Alert and oriented x4, interactive, lying in bed. With distress.  Skin: warm and dry, not jaundiced, no rash, no ecchymoses  HEENT: Atraumatic, MMM, PERRLA, EOMI, neck supple  CV: RRR, no murmur, rubs, or gallops  Resp: Clear to auscultation bilaterally, no wheezes, crackles  Abd: Soft, non-tender, non-distended, BS+, no masses appreciated  Extremities: warm and well perfused, palpable pulses, no edema  Neuro: alert and oriented, no lateralizing symptoms or focal neurologic deficits  Psych: normal mood and affect         Data:      ROUTINE LABS (Last four results)  CMP    Recent Labs  Lab 06/15/17  1134 06/15/17  0816   NA  --  135   POTASSIUM 3.8 3.4   CHLORIDE  --  105   CO2  --  7*   ANIONGAP  --  22*   GLC  --  312*   BUN  --  7   CR  --  0.72   GFRESTIMATED  --  89   GFRESTBLACK  --  >90African American GFR Calc   JESSICA  --  8.4*   PROTTOTAL  --  6.8   ALBUMIN  --  4.0   BILITOTAL  --  0.7   ALKPHOS  --  142   AST  --  256*   ALT  --  384*       CBC    Recent Labs  Lab 06/15/17  0816   WBC 9.9   RBC 4.45   HGB 13.6   HCT 41.2   MCV 93   MCH 30.6   MCHC 33.0   RDW 15.1*        INRNo lab results found in last 7 days.  Lactate: 1.2    Arterial Blood Gas: Pending  VBG (1122): 7.03/20/21/5    UA RESULTS:  Recent Labs   Lab Test  06/15/17   1132   COLOR  Yellow   APPEARANCE  Slightly Cloudy   URINEGLC  >1000*   URINEBILI  Negative   URINEKETONE  >150*   SG  1.024   UBLD  Trace*   URINEPH  5.5   PROTEIN  30*   NITRITE  Negative   LEUKEST  Negative   RBCU  8*   WBCU  2     Imaging:    EXAMINATION: XR CHEST PORT 1 VW  6/15/2017 10:57 AM       CLINICAL HISTORY: Worsening dyspnea      COMPARISON: 7/19/2013         FINDINGS:  Single frontal view of the chest. Neurostimulator leads projecting  over high/mid thoracic spine. Removal of right IJ " central venous  catheter. Surgical clips in partially visualized upper abdomen.     Cardiac silhouette within normal limits. Bronchovascular markings are  unremarkable. No focal airspace opacity. No pleural effusion or  pneumothorax. Partially visualized upper abdomen is unremarkable.     IMPRESSION:  No acute airspace disease.     I have personally reviewed the examination and initial interpretation  and I agree with the findings.     CUATE KELLER MD

## 2017-06-15 NOTE — LETTER
6/15/2017       RE: Beverly Elena  [address] [place] [place] [id number]     Dear Colleague,    Thank you for referring your patient, Beverly Elena, to the [facility] at [facility]. Please see a copy of my visit note below.    This patient was not seen in clinic.  Sent to ED prior to appointment for DKA.    Again, thank you for allowing me to participate in the care of your patient.      Sincerely,    Trinity Power MD

## 2017-06-15 NOTE — PHARMACY-ADMISSION MEDICATION HISTORY
Admission medication history interview status for the 6/15/2017 admission is complete. See Epic admission navigator for allergy information, pharmacy, prior to admission medications and immunization status.     Medication history interview sources:  Patient    Changes made to PTA medication list (reason)  Added: none  Deleted: patient reported she is no longer receiving tube feeds or taking the following:  -amino acids, 1 dose by mouth daily  -amylase-lipase-protease (Viokace) 14096 units, take as directed with tube feedings  -furosemide 40 mg by mouth daily as needed  -gabapentin 600 mg by mouth three times daily  -multivitamin liquid, 1 dose by mouth daily  -nutritional supplements (Boost high protein)  -opium tincture, 6 mg (0.6 mL) by mouth every 6 hours as needed  -oxycodone 30 mg by mouth every 4 hours as needed  -oxycodone 5 mg tablets, no sig.  -sodium bicarbonate 325 mg tablet, take as directed with tube feeds to dissolve enzymes  -trazodone 100 mg by mouth at bedtime  Changed:   -Creon 69475 units, 3 with meals and 2 with snacks --> 4 with meals and 2 with snacks  -Rizatriptan 10 mg tablet, take by mouth at onset of headache --> take 10 mg by mouth at onset of headache    Additional medication history information (including reliability of information, actions taken by pharmacist):  -Patient was a good historian and able to answer all questions  -Patient uses amitriptyline for sleep and migraines  -Patient reports her insulin pump is set at 1 unit/hour  -Patient takes metoclopramide 2-3 times per day and ondansetron 3-4 times per day  -Patient received her flu shot this past year      Prior to Admission medications    Medication Sig Last Dose Taking? Auth Provider   amitriptyline (ELAVIL) 100 MG tablet Take 1 tablet (100 mg) by mouth At Bedtime 6/14/2017 at PM Yes Narayan Conti MD   esomeprazole (NEXIUM) 40 MG capsule Take 1 capsule (40 mg) by mouth 2 times daily Take 30-60 minutes before eating.  6/15/2017 at AM Yes Adebayo Smiley MD   amylase-lipase-protease (CREON) 00511 UNITS CPEP 3 with meals & 2 with snacks  Patient taking differently: 4 with meals & 2 with snacks 6/15/2017 at AM Yes Rajendra Cruz MD   Insulin Aspart (INSULIN PUMP - OUTPATIENT) 1 unit/hour 6/15/2017 at Unknown time Yes Reported, Patient   Probiotic Product (PROBIOTIC DAILY PO) Take 2 tablets by mouth daily 6/14/2017 at Unknown time Yes Reported, Patient   Cholecalciferol (VITAMIN D3 PO) Take 8,000 Units by mouth daily  Past Week at Unknown time Yes Reported, Patient   ondansetron (ZOFRAN-ODT) 8 MG disintegrating tablet Every 4-6 hours as needed 6/15/2017 at AM Yes Reported, Patient   metoclopramide (REGLAN) 10 MG tablet Take 1 tablet by mouth every 6 hours as needed 6/14/2017 at AM Yes Reported, Patient   Multiple Vitamins-Minerals (ADEKS) chewable tablet Take 1 tablet by mouth 2 times daily  Patient taking differently: Take 2 tablets by mouth 2 times daily  6/14/2017 at AM Yes Rajendra Cruz MD   ACCU-CHEK FASTCLIX LANCETS MISC 1 each every 2 hours as needed.   Rajendra Cruz MD   glucose blood VI test strips strip Use 6-8 times daily to check blood glucose levels.  (Accu-chek Smartview test strips)   Galina Terry PA-C   Alcohol Swabs 70 % PADS Use to check blood glucose 6-8 times daily   Galina Terry PA-C   BD SHARPS CONTAINER HOME MISC Use to dispose of used sharps   Galina Terry PA-C   glucose 40 % GEL Take 15 g by mouth every hour as needed.   Galina Terry PA-C   rizatriptan (MAXALT) 10 MG tablet Take  10 mg by mouth at onset of headache. More than a month at Unknown time  Reported, Patient         Medication history completed by: Va Montague, Pharm.D. Student

## 2017-06-15 NOTE — PLAN OF CARE
Problem: Diabetes, Type 1 (Adult)  Goal: Signs and Symptoms of Listed Potential Problems Will be Absent or Manageable (Diabetes, Type 1)  Signs and symptoms of listed potential problems will be absent or manageable by discharge/transition of care (reference Diabetes, Type 1 (Adult) CPG).   Outcome: No Change  Pt came from ED around 1500. Pt is alert and orientated x4. Lung sounds clear and no productive cough. Sinus tach with no ectopy normotensive pressures. Pt on DKA insulin gtt currently on alg 3 @ 5.5 ml/hr and 250cc of D5-0.45NS+30KCL. 3L of fluid given today. Pt had one unmeasured void. Clear liquids ordered. No overt skin issues. Continue to monitor pt status and notify sx of any changes.

## 2017-06-15 NOTE — TELEPHONE ENCOUNTER
E-mail sent from patient on June 8th .    Hello,   Sorry to bother you again but I am wondering if I could possibly see you Dr. Power or someone else.  I had the issues with my blood sugars this fall but it eventually evened out a bit, but over the past several months I tim feel like I am falling apart.  My sugars are high frequently.  I just don't know when I am going to absorb so it is often difficult to know when to administer my insulin and I am chasing my blood sugars all over the place.  I thought it was an issue with my pump becaue it has a few other things wrong with it but I have been doing traditional injections and there is no change.  [employer] is sending me a different pump (I don't have any logs from it because it was not recording boluses for some reason (I have a large paper log I have been keeping).  I don't think I am absorbing very well either.  I have lost 10lbs in the last two months without trying at all.  I feel awful all the time.  My skin is gross and my teeth are all deminerlized and falling apart. Among many other things happening. I can't get into my primary until August to do labs, etc.  My docs here try to help but they just don't have the experience with my problems and they always ask what [facility] says about it so I just usually try to figure something out myself.  I have been trying hard to maintain on my own with everything but I just don't know what to do anymore.  I also did not get the labs done suggested before or the mixed meal test.  I thought things had a improved at least for a bit anyway.  Thank you for any help,

## 2017-06-15 NOTE — NURSING NOTE
Cancelled boost per coordinator and Dr Power. PT drank boost this morning with a BS over 300. Fasting labs indicated that PT should go to [facility] ER.  I personally told patient her labs and that she needed to go to the ER. She agreed and her  (who is with her) will bring her now.

## 2017-06-15 NOTE — ED NOTES
ED Referral Note:    --Patient name, :  Beverly Milligan; 1975      --MR#:  [id number]      --Referral/Medical C/o:  [employer] of 7; possible DKA; HX:  Previous pancreatectomy      --ETA:  0930am    Denis Gatica  Nataly 15, 2017  0920am

## 2017-06-15 NOTE — ED NOTES
Beverly presents today from clinic with abnormal labs(anion gap, elevated LFTs and BG).  Has been having recent symptoms of high blood glucose and generalized weakness at home.  Pt has a history of pancreatectomy and islet cell transplant.

## 2017-06-15 NOTE — MR AVS SNAPSHOT
After Visit Summary   6/15/2017    Beverly Elena    MRN: [id number]           Patient Information     Date Of Birth          1975        Visit Information        Provider Department      6/15/2017 11:30 AM Trinity Power MD [facility] Solid Organ Transplant        Today's Diagnoses     Diabetic ketoacidosis without coma associated with type 1 diabetes mellitus (H)    -  1       Follow-ups after your visit        Future tests that were ordered for you today     Open Standing Orders        Priority Remaining Interval Expires Ordered    Potassium Routine 100/100 CONDITIONAL (SPECIFY)  6/15/2017    Magnesium Routine 100/100 CONDITIONAL (SPECIFY)  6/15/2017    Phosphorus Routine 100/100 CONDITIONAL (SPECIFY)  6/15/2017    Co2 Total Timed 189/192 EVERY 2 HOURS  6/15/2017    Place lab order for Potassium (LTF830) per DKA IV fluid therapy and potassium replacement on MAR STAT [id number] PRN  6/15/2017    Oxygen: Nasal cannula, Oxygen mask STAT 10542/85956 CONTINUOUS  6/15/2017    Notify Provider STAT [id number] PRN  6/15/2017    Glucose monitor nursing POCT STAT 100/100 CONDITIONAL (SPECIFY)  6/15/2017    Glucose monitor nursing POCT STAT 100/100 CONDITIONAL (SPECIFY)  6/15/2017    Ketone Beta-Hydroxybutyrate Quantitative Timed 177/184 EVERY 2 HOURS  6/15/2017    Basic metabolic panel Timed 177/184 EVERY 2 HOURS  6/15/2017    Phosphorus Timed 177/184 EVERY 2 HOURS  6/15/2017    Glucose - Diabetes STAT 1/1 CONDITIONAL X 1 STAT  6/15/2017    Glucose monitor nursing POCT STAT 100/100 CONDITIONAL (SPECIFY)  6/15/2017    Glucose monitor nursing POCT STAT 100/100 CONDITIONAL (SPECIFY)  6/15/2017    Notify Provider STAT [id number] PRN  6/15/2017            Who to contact     If you have questions or need follow up information about today's clinic visit or your schedule please contact [facility] TRANSPL[facility] directly at [phone].  Normal or non-critical lab and imaging results will be  communicated to you by [employer], letter or phone within 4 business days after the clinic has received the results. If you do not hear from us within 7 days, please contact the clinic through Glamit or phone. If you have a critical or abnormal lab result, we will notify you by phone as soon as possible.  Submit refill requests through Glamit or call your pharmacy and they will forward the refill request to us. Please allow 3 business days for your refill to be completed.          Additional Information About Your Visit        Glamit Information     Glamit gives you secure access to your electronic health record. If you see a primary care provider, you can also send messages to your care team and make appointments. If you have questions, please call your primary care clinic.  If you do not have a primary care provider, please call [phone] and they will assist you.        Care EveryWhere ID     This is your Care EveryWhere ID. This could be used by other organizations to access your [place] medical records  [id number]        Your Vitals Were     Last Period                   (LMP Unknown)            Blood Pressure from Last 3 Encounters:   06/15/17 116/69   02/19/16 116/69   08/05/15 101/59    Weight from Last 3 Encounters:   06/15/17 56.7 kg (125 lb)   06/15/17 54.6 kg (120 lb 6.4 oz)   02/19/16 56.7 kg (125 lb)              Today, you had the following     No orders found for display         Today's Medication Changes          These changes are accurate as of: 6/15/17  9:34 AM.  If you have any questions, ask your nurse or doctor.               These medicines have changed or have updated prescriptions.        Dose/Directions    adeks chewable tablet   This may have changed:  how much to take   Used for:  Pancreatic insufficiency        Dose:  1 tablet   Take 1 tablet by mouth 2 times daily   Quantity:  60 tablet   Refills:  3                Primary Care Provider Office Phone # Fax #    Marleny Hathaway  [phone]       [facility] [address] [address]        Thank you!     Thank you for choosing [facility]GAN TRANSPL[facility]  for your care. Our goal is always to provide you with excellent care. Hearing back from our patients is one way we can continue to improve our services. Please take a few minutes to complete the written survey that you may receive in the mail after your visit with us. Thank you!             Your Updated Medication List - Protect others around you: Learn how to safely use, store and throw away your medicines at www.disposemymeds.org.          This list is accurate as of: 6/15/17  9:34 AM.  Always use your most recent med list.                   Brand Name Dispense Instructions for use    adeks chewable tablet     60 tablet    Take 1 tablet by mouth 2 times daily       alcohol swab prep pads     1 each    Use to check blood glucose 6-8 times daily       amitriptyline 100 MG tablet    ELAVIL    30 tablet    Take 1 tablet (100 mg) by mouth At Bedtime       amylase-lipase-protease 97211 UNITS Cpep per EC capsule    CREON    450 capsule    3 with meals & 2 with snacks       BD SHARPS CONTAINER HOME Misc     1 each    Use to dispose of used sharps       blood glucose monitoring lancets     3 each    1 each every 2 hours as needed.       blood glucose monitoring test strip    no brand specified    3 Month    Use 6-8 times daily to check blood glucose levels.  (Accu-chek Smartview test strips)       esomeprazole 40 MG CR capsule    nexIUM    90 capsule    Take 1 capsule (40 mg) by mouth 2 times daily Take 30-60 minutes before eating.       glucose 40 % Gel gel     10 each    Take 15 g by mouth every hour as needed.       INSULIN PUMP - OUTPATIENT      1 unit/hour       MAXALT 10 MG tablet   Generic drug:  rizatriptan      Take 10 mg by mouth at onset of headache       ondansetron 8 MG ODT tab    ZOFRAN-ODT     Every 4-6 hours as needed       PROBIOTIC DAILY PO       Take 2 tablets by mouth daily       REGLAN 10 MG tablet   Generic drug:  metoclopramide      Take 1 tablet by mouth every 6 hours as needed       VITAMIN D3 PO      Take 8,000 Units by mouth daily

## 2017-06-15 NOTE — TELEPHONE ENCOUNTER
DATE:  6/15/2017   TIME OF RECEIPT FROM LAB:  0850  LAB TEST:  CO2  LAB VALUE:  7  RESULTS GIVEN WITH READ-BACK TO (PROVIDER):  Calin Gerard  TIME LAB VALUE REPORTED TO PROVIDER:   0856

## 2017-06-15 NOTE — PROGRESS NOTES
Todays critical labs reviewed with Dr Power. Plan to send to [facility] for evaluation of Co2 of 7,   Jhon LEOS in transplant clinic informed and will speak with pt and arrange transfer.

## 2017-06-15 NOTE — MR AVS SNAPSHOT
After Visit Summary   6/15/2017    Beverly Elena    MRN: [id number]           Patient Information     Date Of Birth          1975        Visit Information        Provider Department      6/15/2017 8:30 AM Nurse, [facility] Solid Organ Transplant        Today's Diagnoses     Post-pancreatectomy diabetes (H)    -  1       Follow-ups after your visit        Your next 10 appointments already scheduled     Melo 15, 2017 11:30 AM CDT   (Arrive by 11:15 AM)   Return [place] with Trinity Power MD   [facility] Solid Organ Transplant ([facility] [place])    [address]  [address] [facility] [address] [phone]              Who to contact     If you have questions or need follow up information about today's clinic visit or your schedule please contact [facility]GAN TRANSPL[facility] directly at [phone].  Normal or non-critical lab and imaging results will be communicated to you by MyChart, letter or phone within 4 business days after the clinic has received the results. If you do not hear from us within 7 days, please contact the clinic through [employer] or phone. If you have a critical or abnormal lab result, we will notify you by phone as soon as possible.  Submit refill requests through Regaalo or call your pharmacy and they will forward the refill request to us. Please allow 3 business days for your refill to be completed.          Additional Information About Your Visit        MyChart Information     Regaalo gives you secure access to your electronic health record. If you see a primary care provider, you can also send messages to your care team and make appointments. If you have questions, please call your primary care clinic.  If you do not have a primary care provider, please call [phone] and they will assist you.        Care EveryWhere ID     This is your Care EveryWhere ID. This could be used by other organizations to access your [place]  "medical records  [id number]        Your Vitals Were     Height BMI (Body Mass Index)                1.549 m (5' 1\") 22.75 kg/m2           Blood Pressure from Last 3 Encounters:   02/19/16 116/69   08/05/15 101/59   03/11/15 121/73    Weight from Last 3 Encounters:   06/15/17 54.6 kg (120 lb 6.4 oz)   02/19/16 56.7 kg (125 lb)   08/05/15 64.4 kg (141 lb 14.4 oz)              Today, you had the following     No orders found for display         Today's Medication Changes          These changes are accurate as of: 6/15/17  9:15 AM.  If you have any questions, ask your nurse or doctor.               These medicines have changed or have updated prescriptions.        Dose/Directions    adeks chewable tablet   This may have changed:  how much to take   Used for:  Pancreatic insufficiency        Dose:  1 tablet   Take 1 tablet by mouth 2 times daily   Quantity:  60 tablet   Refills:  3       gabapentin 600 MG tablet   Commonly known as:  NEURONTIN   This may have changed:  when to take this   Used for:  Chronic pancreatitis (H), Acute post-operative pain, Chronic abdominal pain        Dose:  600 mg   Take 1 tablet (600 mg) by mouth 3 times daily   Quantity:  90 tablet   Refills:  6                Primary Care Provider Office Phone # Fax #    Marleny Hathaway [phone]       [facility] [address] [facility] [address]        Thank you!     Thank you for choosing [facility] ORGAN TRANSPLANT  for your care. Our goal is always to provide you with excellent care. Hearing back from our patients is one way we can continue to improve our services. Please take a few minutes to complete the written survey that you may receive in the mail after your visit with us. Thank you!             Your Updated Medication List - Protect others around you: Learn how to safely use, store and throw away your medicines at www.disposemymeds.org.          This list is accurate as of: 6/15/17  9:15 AM.  Always use your " most recent med list.                   Brand Name Dispense Instructions for use    adeks chewable tablet     60 tablet    Take 1 tablet by mouth 2 times daily       alcohol swab prep pads     1 each    Use to check blood glucose 6-8 times daily       AMINO ACID PO      Take 1 Dose by mouth daily       amitriptyline 100 MG tablet    ELAVIL    30 tablet    Take 1 tablet (100 mg) by mouth At Bedtime       * amylase-lipase-protease 99670 UNITS per tablet    VIOKACE    300 tablet    Take  As directed with tube feedings and meals  2-3 with meals  1-2 worthy tune feeds       * amylase-lipase-protease 54328 UNITS Cpep per EC capsule    CREON    450 capsule    3 with meals & 2 with snacks       BD SHARPS CONTAINER HOME Misc     1 each    Use to dispose of used sharps       blood glucose monitoring lancets     3 each    1 each every 2 hours as needed.       blood glucose monitoring test strip    no brand specified    3 Month    Use 6-8 times daily to check blood glucose levels.  (Accu-chek Smartview test strips)       BOOST HIGH PROTEIN Liqd      After above baseline labs are drawn, give: 6 mL/kg to maximum of 360 mL; the beverage is to be consumed within 5 minutes.       esomeprazole 40 MG CR capsule    nexIUM    90 capsule    Take 1 capsule (40 mg) by mouth 2 times daily Take 30-60 minutes before eating.       gabapentin 600 MG tablet    NEURONTIN    90 tablet    Take 1 tablet (600 mg) by mouth 3 times daily       glucose 40 % Gel gel     10 each    Take 15 g by mouth every hour as needed.       INSULIN PUMP - OUTPATIENT          LASIX PO      Take 40 mg by mouth daily as needed       MAXALT 10 MG tablet   Generic drug:  rizatriptan      Take  by mouth at onset of headache.       ondansetron 8 MG ODT tab    ZOFRAN-ODT     Every 4-6 hours as needed       opium tincture tincture     118 mL    Take 0.6 mLs (6 mg) by mouth every 6 hours as needed       * oxyCODONE HCl 5 MG Taba    OXECTA         * oxyCODONE 10 MG IR tablet     ROXICODONE    120 tablet    Take 3 tablets (30 mg) by mouth every 4 hours as needed This is a 1 month supply.       PROBIOTIC DAILY PO      Take 2 tablets by mouth daily       REGLAN 10 MG tablet   Generic drug:  metoclopramide      Take 1 tablet by mouth every 6 hours as needed       sodium bicarbonate 325 MG tablet     100 tablet    Take as directed with tube feeds to dissolve enzymes       traZODone 50 MG tablet    DESYREL    60 tablet    Take 2 tablets (100 mg) by mouth At Bedtime       VITAMIN D3 PO      Take 8,000 Units by mouth daily       VITAMIN LIQUID PO      Take 1 Dose by mouth daily       * Notice:  This list has 4 medication(s) that are the same as other medications prescribed for you. Read the directions carefully, and ask your doctor or other care provider to review them with you.

## 2017-06-16 ENCOUNTER — APPOINTMENT (OUTPATIENT)
Dept: GENERAL RADIOLOGY | Facility: CLINIC | Age: 42
DRG: 641 | End: 2017-06-16
Payer: COMMERCIAL

## 2017-06-16 LAB
ALBUMIN SERPL-MCNC: 2.6 G/DL (ref 3.4–5)
ALBUMIN UR-MCNC: NEGATIVE MG/DL
ALP SERPL-CCNC: 96 U/L (ref 40–150)
ALT SERPL W P-5'-P-CCNC: 193 U/L (ref 0–50)
ANION GAP SERPL CALCULATED.3IONS-SCNC: 10 MMOL/L (ref 3–14)
ANION GAP SERPL CALCULATED.3IONS-SCNC: 10 MMOL/L (ref 3–14)
ANION GAP SERPL CALCULATED.3IONS-SCNC: 11 MMOL/L (ref 3–14)
ANION GAP SERPL CALCULATED.3IONS-SCNC: 6 MMOL/L (ref 3–14)
ANION GAP SERPL CALCULATED.3IONS-SCNC: 7 MMOL/L (ref 3–14)
ANION GAP SERPL CALCULATED.3IONS-SCNC: 8 MMOL/L (ref 3–14)
ANION GAP SERPL CALCULATED.3IONS-SCNC: 9 MMOL/L (ref 3–14)
ANION GAP SERPL CALCULATED.3IONS-SCNC: 9 MMOL/L (ref 3–14)
APPEARANCE UR: CLEAR
AST SERPL W P-5'-P-CCNC: 110 U/L (ref 0–45)
BACTERIA #/AREA URNS HPF: ABNORMAL /HPF
BASE DEFICIT BLDV-SCNC: 12.6 MMOL/L
BASE DEFICIT BLDV-SCNC: 15.6 MMOL/L
BILIRUB SERPL-MCNC: 0.3 MG/DL (ref 0.2–1.3)
BILIRUB UR QL STRIP: NEGATIVE
BUN SERPL-MCNC: 1 MG/DL (ref 7–30)
BUN SERPL-MCNC: 2 MG/DL (ref 7–30)
BUN SERPL-MCNC: 3 MG/DL (ref 7–30)
BUN SERPL-MCNC: <1 MG/DL (ref 7–30)
BUN SERPL-MCNC: <1 MG/DL (ref 7–30)
CA-I BLD-MCNC: 4.5 MG/DL (ref 4.4–5.2)
CALCIUM SERPL-MCNC: 6.6 MG/DL (ref 8.5–10.1)
CALCIUM SERPL-MCNC: 6.9 MG/DL (ref 8.5–10.1)
CALCIUM SERPL-MCNC: 6.9 MG/DL (ref 8.5–10.1)
CALCIUM SERPL-MCNC: 7 MG/DL (ref 8.5–10.1)
CALCIUM SERPL-MCNC: 7.1 MG/DL (ref 8.5–10.1)
CALCIUM SERPL-MCNC: 7.1 MG/DL (ref 8.5–10.1)
CALCIUM SERPL-MCNC: 7.2 MG/DL (ref 8.5–10.1)
CALCIUM SERPL-MCNC: 7.4 MG/DL (ref 8.5–10.1)
CHLORIDE SERPL-SCNC: 114 MMOL/L (ref 94–109)
CHLORIDE SERPL-SCNC: 115 MMOL/L (ref 94–109)
CHLORIDE SERPL-SCNC: 116 MMOL/L (ref 94–109)
CHLORIDE SERPL-SCNC: 119 MMOL/L (ref 94–109)
CHLORIDE UR-SCNC: 68 MMOL/L
CO2 SERPL-SCNC: 12 MMOL/L (ref 20–32)
CO2 SERPL-SCNC: 13 MMOL/L (ref 20–32)
CO2 SERPL-SCNC: 14 MMOL/L (ref 20–32)
CO2 SERPL-SCNC: 14 MMOL/L (ref 20–32)
CO2 SERPL-SCNC: 15 MMOL/L (ref 20–32)
CO2 SERPL-SCNC: 15 MMOL/L (ref 20–32)
COLOR UR AUTO: ABNORMAL
CREAT SERPL-MCNC: 0.42 MG/DL (ref 0.52–1.04)
CREAT SERPL-MCNC: 0.43 MG/DL (ref 0.52–1.04)
CREAT SERPL-MCNC: 0.44 MG/DL (ref 0.52–1.04)
CREAT SERPL-MCNC: 0.49 MG/DL (ref 0.52–1.04)
CREAT SERPL-MCNC: 0.5 MG/DL (ref 0.52–1.04)
CREAT SERPL-MCNC: 0.56 MG/DL (ref 0.52–1.04)
CREAT UR-MCNC: 10 MG/DL
DEPRECATED CALCIDIOL+CALCIFEROL SERPL-MC: NORMAL UG/L (ref 20–75)
GFR SERPL CREATININE-BSD FRML MDRD: ABNORMAL ML/MIN/1.7M2
GLUCOSE BLDC GLUCOMTR-MCNC: 102 MG/DL (ref 70–99)
GLUCOSE BLDC GLUCOMTR-MCNC: 123 MG/DL (ref 70–99)
GLUCOSE BLDC GLUCOMTR-MCNC: 127 MG/DL (ref 70–99)
GLUCOSE BLDC GLUCOMTR-MCNC: 162 MG/DL (ref 70–99)
GLUCOSE BLDC GLUCOMTR-MCNC: 162 MG/DL (ref 70–99)
GLUCOSE BLDC GLUCOMTR-MCNC: 173 MG/DL (ref 70–99)
GLUCOSE BLDC GLUCOMTR-MCNC: 190 MG/DL (ref 70–99)
GLUCOSE BLDC GLUCOMTR-MCNC: 191 MG/DL (ref 70–99)
GLUCOSE BLDC GLUCOMTR-MCNC: 194 MG/DL (ref 70–99)
GLUCOSE BLDC GLUCOMTR-MCNC: 198 MG/DL (ref 70–99)
GLUCOSE BLDC GLUCOMTR-MCNC: 200 MG/DL (ref 70–99)
GLUCOSE BLDC GLUCOMTR-MCNC: 205 MG/DL (ref 70–99)
GLUCOSE BLDC GLUCOMTR-MCNC: 207 MG/DL (ref 70–99)
GLUCOSE BLDC GLUCOMTR-MCNC: 210 MG/DL (ref 70–99)
GLUCOSE BLDC GLUCOMTR-MCNC: 221 MG/DL (ref 70–99)
GLUCOSE BLDC GLUCOMTR-MCNC: 222 MG/DL (ref 70–99)
GLUCOSE BLDC GLUCOMTR-MCNC: 247 MG/DL (ref 70–99)
GLUCOSE BLDC GLUCOMTR-MCNC: 301 MG/DL (ref 70–99)
GLUCOSE BLDC GLUCOMTR-MCNC: 88 MG/DL (ref 70–99)
GLUCOSE SERPL-MCNC: 149 MG/DL (ref 70–99)
GLUCOSE SERPL-MCNC: 187 MG/DL (ref 70–99)
GLUCOSE SERPL-MCNC: 197 MG/DL (ref 70–99)
GLUCOSE SERPL-MCNC: 214 MG/DL (ref 70–99)
GLUCOSE SERPL-MCNC: 219 MG/DL (ref 70–99)
GLUCOSE SERPL-MCNC: 236 MG/DL (ref 70–99)
GLUCOSE SERPL-MCNC: 239 MG/DL (ref 70–99)
GLUCOSE SERPL-MCNC: 324 MG/DL (ref 70–99)
GLUCOSE UR STRIP-MCNC: >1000 MG/DL
HAV IGG SER QL IA: NORMAL
HBV CORE AB SERPL QL IA: NONREACTIVE
HBV CORE IGM SERPL QL IA: NORMAL
HBV SURFACE AB SERPL IA-ACNC: 6.79 M[IU]/ML
HBV SURFACE AG SERPL QL IA: NONREACTIVE
HCO3 BLDV-SCNC: 10 MMOL/L (ref 21–28)
HCO3 BLDV-SCNC: 13 MMOL/L (ref 21–28)
HCV AB SERPL QL IA: NORMAL
HGB UR QL STRIP: NEGATIVE
KETONES BLD-SCNC: 0 MMOL/L (ref 0–0.6)
KETONES BLD-SCNC: 0.2 MMOL/L (ref 0–0.6)
KETONES BLD-SCNC: 0.2 MMOL/L (ref 0–0.6)
KETONES BLD-SCNC: 0.3 MMOL/L (ref 0–0.6)
KETONES BLD-SCNC: 0.6 MMOL/L (ref 0–0.6)
KETONES BLD-SCNC: 1.1 MMOL/L (ref 0–0.6)
KETONES BLD-SCNC: 1.6 MMOL/L (ref 0–0.6)
KETONES UR STRIP-MCNC: NEGATIVE MG/DL
LEUKOCYTE ESTERASE UR QL STRIP: NEGATIVE
MAGNESIUM SERPL-MCNC: 2.2 MG/DL (ref 1.6–2.3)
MAGNESIUM SERPL-MCNC: 2.6 MG/DL (ref 1.6–2.3)
MUCOUS THREADS #/AREA URNS LPF: PRESENT /LPF
NITRATE UR QL: NEGATIVE
O2/TOTAL GAS SETTING VFR VENT: 21 %
O2/TOTAL GAS SETTING VFR VENT: ABNORMAL %
OSMOLALITY UR: 184 MMOL/KG (ref 100–1200)
PCO2 BLDV: 22 MM HG (ref 40–50)
PCO2 BLDV: 25 MM HG (ref 40–50)
PH BLDV: 7.26 PH (ref 7.32–7.43)
PH BLDV: 7.3 PH (ref 7.32–7.43)
PH UR STRIP: 5 PH (ref 5–7)
PHOSPHATE 24H UR-MCNC: 1.2 G/G CR
PHOSPHATE SERPL-MCNC: 0.8 MG/DL (ref 2.5–4.5)
PHOSPHATE SERPL-MCNC: 0.8 MG/DL (ref 2.5–4.5)
PHOSPHATE SERPL-MCNC: 1.1 MG/DL (ref 2.5–4.5)
PHOSPHATE SERPL-MCNC: 1.2 MG/DL (ref 2.5–4.5)
PHOSPHATE SERPL-MCNC: 1.2 MG/DL (ref 2.5–4.5)
PHOSPHATE SERPL-MCNC: 1.5 MG/DL (ref 2.5–4.5)
PHOSPHATE SERPL-MCNC: 1.6 MG/DL (ref 2.5–4.5)
PHOSPHATE SERPL-MCNC: 2.8 MG/DL (ref 2.5–4.5)
PHOSPHATE UR-MCNC: 11.8 MG/DL
PO2 BLDV: 41 MM HG (ref 25–47)
PO2 BLDV: 56 MM HG (ref 25–47)
POTASSIUM SERPL-SCNC: 3 MMOL/L (ref 3.4–5.3)
POTASSIUM SERPL-SCNC: 3.2 MMOL/L (ref 3.4–5.3)
POTASSIUM SERPL-SCNC: 3.3 MMOL/L (ref 3.4–5.3)
POTASSIUM SERPL-SCNC: 3.4 MMOL/L (ref 3.4–5.3)
POTASSIUM SERPL-SCNC: 3.6 MMOL/L (ref 3.4–5.3)
POTASSIUM SERPL-SCNC: 3.6 MMOL/L (ref 3.4–5.3)
POTASSIUM SERPL-SCNC: 3.8 MMOL/L (ref 3.4–5.3)
POTASSIUM SERPL-SCNC: 3.8 MMOL/L (ref 3.4–5.3)
POTASSIUM UR-SCNC: 12 MMOL/L
PROT SERPL-MCNC: 4.8 G/DL (ref 6.8–8.8)
RBC #/AREA URNS AUTO: <1 /HPF (ref 0–2)
SODIUM SERPL-SCNC: 136 MMOL/L (ref 133–144)
SODIUM SERPL-SCNC: 137 MMOL/L (ref 133–144)
SODIUM SERPL-SCNC: 140 MMOL/L (ref 133–144)
SODIUM SERPL-SCNC: 140 MMOL/L (ref 133–144)
SODIUM SERPL-SCNC: 141 MMOL/L (ref 133–144)
SODIUM UR-SCNC: 48 MMOL/L
SP GR UR STRIP: 1 (ref 1–1.03)
SQUAMOUS #/AREA URNS AUTO: 1 /HPF (ref 0–1)
URN SPEC COLLECT METH UR: ABNORMAL
UROBILINOGEN UR STRIP-MCNC: NORMAL MG/DL (ref 0–2)
UUN UR-MCNC: 46 MG/DL
UUN/CREAT 24H UR: 5 G/G CR
VITAMIN D2 SERPL-MCNC: <5 UG/L
VITAMIN D3 SERPL-MCNC: 17 UG/L
WBC #/AREA URNS AUTO: 1 /HPF (ref 0–2)

## 2017-06-16 PROCEDURE — 84133 ASSAY OF URINE POTASSIUM: CPT | Performed by: STUDENT IN AN ORGANIZED HEALTH CARE EDUCATION/TRAINING PROGRAM

## 2017-06-16 PROCEDURE — 84300 ASSAY OF URINE SODIUM: CPT | Performed by: STUDENT IN AN ORGANIZED HEALTH CARE EDUCATION/TRAINING PROGRAM

## 2017-06-16 PROCEDURE — 81001 URINALYSIS AUTO W/SCOPE: CPT | Performed by: STUDENT IN AN ORGANIZED HEALTH CARE EDUCATION/TRAINING PROGRAM

## 2017-06-16 PROCEDURE — 82803 BLOOD GASES ANY COMBINATION: CPT | Performed by: STUDENT IN AN ORGANIZED HEALTH CARE EDUCATION/TRAINING PROGRAM

## 2017-06-16 PROCEDURE — 83935 ASSAY OF URINE OSMOLALITY: CPT | Performed by: STUDENT IN AN ORGANIZED HEALTH CARE EDUCATION/TRAINING PROGRAM

## 2017-06-16 PROCEDURE — 25000125 ZZHC RX 250: Performed by: STUDENT IN AN ORGANIZED HEALTH CARE EDUCATION/TRAINING PROGRAM

## 2017-06-16 PROCEDURE — 27210197 ZZH KIT POWER PICC TRIPLE LUMEN

## 2017-06-16 PROCEDURE — 40000986 XR CHEST PORT 1 VW

## 2017-06-16 PROCEDURE — 25000128 H RX IP 250 OP 636: Performed by: STUDENT IN AN ORGANIZED HEALTH CARE EDUCATION/TRAINING PROGRAM

## 2017-06-16 PROCEDURE — 82436 ASSAY OF URINE CHLORIDE: CPT | Performed by: STUDENT IN AN ORGANIZED HEALTH CARE EDUCATION/TRAINING PROGRAM

## 2017-06-16 PROCEDURE — 86705 HEP B CORE ANTIBODY IGM: CPT | Performed by: STUDENT IN AN ORGANIZED HEALTH CARE EDUCATION/TRAINING PROGRAM

## 2017-06-16 PROCEDURE — C1769 GUIDE WIRE: HCPCS

## 2017-06-16 PROCEDURE — 82010 KETONE BODYS QUAN: CPT | Performed by: CLINICAL NURSE SPECIALIST

## 2017-06-16 PROCEDURE — 87340 HEPATITIS B SURFACE AG IA: CPT | Performed by: STUDENT IN AN ORGANIZED HEALTH CARE EDUCATION/TRAINING PROGRAM

## 2017-06-16 PROCEDURE — 36415 COLL VENOUS BLD VENIPUNCTURE: CPT | Performed by: STUDENT IN AN ORGANIZED HEALTH CARE EDUCATION/TRAINING PROGRAM

## 2017-06-16 PROCEDURE — 82374 ASSAY BLOOD CARBON DIOXIDE: CPT | Performed by: CLINICAL NURSE SPECIALIST

## 2017-06-16 PROCEDURE — 80048 BASIC METABOLIC PNL TOTAL CA: CPT | Performed by: STUDENT IN AN ORGANIZED HEALTH CARE EDUCATION/TRAINING PROGRAM

## 2017-06-16 PROCEDURE — 25000132 ZZH RX MED GY IP 250 OP 250 PS 637: Performed by: STUDENT IN AN ORGANIZED HEALTH CARE EDUCATION/TRAINING PROGRAM

## 2017-06-16 PROCEDURE — 84540 ASSAY OF URINE/UREA-N: CPT | Performed by: STUDENT IN AN ORGANIZED HEALTH CARE EDUCATION/TRAINING PROGRAM

## 2017-06-16 PROCEDURE — 86706 HEP B SURFACE ANTIBODY: CPT | Performed by: STUDENT IN AN ORGANIZED HEALTH CARE EDUCATION/TRAINING PROGRAM

## 2017-06-16 PROCEDURE — 82330 ASSAY OF CALCIUM: CPT | Performed by: CLINICAL NURSE SPECIALIST

## 2017-06-16 PROCEDURE — 36569 INSJ PICC 5 YR+ W/O IMAGING: CPT

## 2017-06-16 PROCEDURE — 86803 HEPATITIS C AB TEST: CPT | Performed by: STUDENT IN AN ORGANIZED HEALTH CARE EDUCATION/TRAINING PROGRAM

## 2017-06-16 PROCEDURE — 83735 ASSAY OF MAGNESIUM: CPT | Performed by: STUDENT IN AN ORGANIZED HEALTH CARE EDUCATION/TRAINING PROGRAM

## 2017-06-16 PROCEDURE — 00000146 ZZHCL STATISTIC GLUCOSE BY METER IP

## 2017-06-16 PROCEDURE — 40000556 ZZH STATISTIC PERIPHERAL IV START W US GUIDANCE

## 2017-06-16 PROCEDURE — 36415 COLL VENOUS BLD VENIPUNCTURE: CPT | Performed by: CLINICAL NURSE SPECIALIST

## 2017-06-16 PROCEDURE — 82803 BLOOD GASES ANY COMBINATION: CPT | Performed by: CLINICAL NURSE SPECIALIST

## 2017-06-16 PROCEDURE — 84105 ASSAY OF URINE PHOSPHORUS: CPT | Performed by: STUDENT IN AN ORGANIZED HEALTH CARE EDUCATION/TRAINING PROGRAM

## 2017-06-16 PROCEDURE — 86708 HEPATITIS A ANTIBODY: CPT | Performed by: STUDENT IN AN ORGANIZED HEALTH CARE EDUCATION/TRAINING PROGRAM

## 2017-06-16 PROCEDURE — 82010 KETONE BODYS QUAN: CPT | Performed by: STUDENT IN AN ORGANIZED HEALTH CARE EDUCATION/TRAINING PROGRAM

## 2017-06-16 PROCEDURE — 86704 HEP B CORE ANTIBODY TOTAL: CPT | Performed by: STUDENT IN AN ORGANIZED HEALTH CARE EDUCATION/TRAINING PROGRAM

## 2017-06-16 PROCEDURE — 80048 BASIC METABOLIC PNL TOTAL CA: CPT | Performed by: CLINICAL NURSE SPECIALIST

## 2017-06-16 PROCEDURE — 20000004 ZZH R&B ICU UMMC

## 2017-06-16 PROCEDURE — 84100 ASSAY OF PHOSPHORUS: CPT | Performed by: STUDENT IN AN ORGANIZED HEALTH CARE EDUCATION/TRAINING PROGRAM

## 2017-06-16 PROCEDURE — 25800025 ZZH RX 258: Performed by: INTERNAL MEDICINE

## 2017-06-16 PROCEDURE — 99233 SBSQ HOSP IP/OBS HIGH 50: CPT | Mod: GC | Performed by: INTERNAL MEDICINE

## 2017-06-16 PROCEDURE — 84100 ASSAY OF PHOSPHORUS: CPT | Performed by: CLINICAL NURSE SPECIALIST

## 2017-06-16 PROCEDURE — 80053 COMPREHEN METABOLIC PANEL: CPT | Performed by: STUDENT IN AN ORGANIZED HEALTH CARE EDUCATION/TRAINING PROGRAM

## 2017-06-16 RX ORDER — HEPARIN SODIUM,PORCINE 10 UNIT/ML
2-5 VIAL (ML) INTRAVENOUS
Status: COMPLETED | OUTPATIENT
Start: 2017-06-16 | End: 2017-06-17

## 2017-06-16 RX ORDER — HEPARIN SODIUM,PORCINE 10 UNIT/ML
5-10 VIAL (ML) INTRAVENOUS
Status: DISCONTINUED | OUTPATIENT
Start: 2017-06-16 | End: 2017-06-18 | Stop reason: HOSPADM

## 2017-06-16 RX ORDER — SODIUM CHLORIDE 9 MG/ML
INJECTION, SOLUTION INTRAVENOUS
Status: DISCONTINUED
Start: 2017-06-16 | End: 2017-06-17 | Stop reason: HOSPADM

## 2017-06-16 RX ORDER — LIDOCAINE 40 MG/G
CREAM TOPICAL
Status: DISCONTINUED | OUTPATIENT
Start: 2017-06-16 | End: 2017-06-18 | Stop reason: HOSPADM

## 2017-06-16 RX ORDER — HYDROMORPHONE HYDROCHLORIDE 1 MG/ML
0.5 INJECTION, SOLUTION INTRAMUSCULAR; INTRAVENOUS; SUBCUTANEOUS
Status: DISCONTINUED | OUTPATIENT
Start: 2017-06-16 | End: 2017-06-17

## 2017-06-16 RX ORDER — HEPARIN SODIUM,PORCINE 10 UNIT/ML
5-10 VIAL (ML) INTRAVENOUS EVERY 24 HOURS
Status: DISCONTINUED | OUTPATIENT
Start: 2017-06-16 | End: 2017-06-18 | Stop reason: HOSPADM

## 2017-06-16 RX ADMIN — LIDOCAINE HYDROCHLORIDE 2 ML: 10 INJECTION, SOLUTION INFILTRATION; PERINEURAL at 10:12

## 2017-06-16 RX ADMIN — POTASSIUM CHLORIDE 20 MEQ: 29.8 INJECTION, SOLUTION INTRAVENOUS at 11:33

## 2017-06-16 RX ADMIN — POTASSIUM CHLORIDE, DEXTROSE MONOHYDRATE AND SODIUM CHLORIDE: 224; 5; 450 INJECTION, SOLUTION INTRAVENOUS at 15:31

## 2017-06-16 RX ADMIN — HUMAN INSULIN 7 UNITS/HR: 100 INJECTION, SOLUTION SUBCUTANEOUS at 10:00

## 2017-06-16 RX ADMIN — HUMAN INSULIN 2 UNITS/HR: 100 INJECTION, SOLUTION SUBCUTANEOUS at 22:39

## 2017-06-16 RX ADMIN — POTASSIUM CHLORIDE 10 MEQ: 14.9 INJECTION, SOLUTION, CONCENTRATE PARENTERAL at 04:03

## 2017-06-16 RX ADMIN — POTASSIUM CHLORIDE 20 MEQ: 29.8 INJECTION, SOLUTION INTRAVENOUS at 12:49

## 2017-06-16 RX ADMIN — POTASSIUM CHLORIDE, DEXTROSE MONOHYDRATE AND SODIUM CHLORIDE: 224; 5; 450 INJECTION, SOLUTION INTRAVENOUS at 06:08

## 2017-06-16 RX ADMIN — AMITRIPTYLINE HYDROCHLORIDE 100 MG: 100 TABLET, FILM COATED ORAL at 21:42

## 2017-06-16 RX ADMIN — SODIUM PHOSPHATE, MONOBASIC, MONOHYDRATE AND SODIUM PHOSPHATE, DIBASIC, ANHYDROUS 20 MMOL: 276; 142 INJECTION, SOLUTION INTRAVENOUS at 06:03

## 2017-06-16 RX ADMIN — POTASSIUM PHOSPHATE, MONOBASIC AND POTASSIUM PHOSPHATE, DIBASIC 20 MMOL: 224; 236 INJECTION, SOLUTION INTRAVENOUS at 13:01

## 2017-06-16 RX ADMIN — POTASSIUM CHLORIDE 10 MEQ: 14.9 INJECTION, SOLUTION, CONCENTRATE PARENTERAL at 04:45

## 2017-06-16 RX ADMIN — POTASSIUM CHLORIDE, DEXTROSE MONOHYDRATE AND SODIUM CHLORIDE: 224; 5; 450 INJECTION, SOLUTION INTRAVENOUS at 02:21

## 2017-06-16 RX ADMIN — HUMAN INSULIN: 100 INJECTION, SOLUTION SUBCUTANEOUS at 01:14

## 2017-06-16 RX ADMIN — HUMAN INSULIN: 100 INJECTION, SOLUTION SUBCUTANEOUS at 05:09

## 2017-06-16 RX ADMIN — POTASSIUM CHLORIDE 10 MEQ: 14.9 INJECTION, SOLUTION, CONCENTRATE PARENTERAL at 03:06

## 2017-06-16 RX ADMIN — POTASSIUM CHLORIDE 10 MEQ: 14.9 INJECTION, SOLUTION, CONCENTRATE PARENTERAL at 01:20

## 2017-06-16 RX ADMIN — POTASSIUM CHLORIDE, DEXTROSE MONOHYDRATE AND SODIUM CHLORIDE: 224; 5; 450 INJECTION, SOLUTION INTRAVENOUS at 22:39

## 2017-06-16 RX ADMIN — MAGNESIUM SULFATE IN WATER 4 G: 40 INJECTION, SOLUTION INTRAVENOUS at 05:09

## 2017-06-16 RX ADMIN — POTASSIUM CHLORIDE, DEXTROSE MONOHYDRATE AND SODIUM CHLORIDE: 224; 5; 450 INJECTION, SOLUTION INTRAVENOUS at 10:22

## 2017-06-16 ASSESSMENT — PAIN DESCRIPTION - DESCRIPTORS: DESCRIPTORS: ACHING;CONSTANT

## 2017-06-16 NOTE — CONSULTS
[facility] Surgery Consultation    Beverly Elena MRN# [id number]   Age: 41 year old YOB: 1975     Date of Admission:  6/15/2017    Reason for consult: Abdominal pain       Requesting physician: MICU       Level of consult: One-time consult to assist in determining a diagnosis and to recommend an appropriate treatment plan             Chief Complaint:   DKA         Chief Complaint:   41F s/p TPIAT (2013) complicated by portal vein thrombosis needing reexploration and tPA. Postoperative pt continued to have issues with gastroparesis as well s/p gastric pacemaker preoperative and recent revision at outside hospital. Had a GJ tube, since removed, now with a Gtube in place. Recently the patient has been having continued problems with gastroparesis and difficulty in controlling blood sugars even on an insulin pump, current HgA1c > 12. Endorses weight loss, acholic stools. Denies abdominal pain. No regular BMs over the past few days. Denies current fevers, new n/v, sob/cp.          Past Medical History:     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) 6/28/2013     Endometriosis      Endometriosis 9/9/2011     Exocrine pancreatic insufficiency 6/28/2013     Gastro-oesophageal reflux disease      Gastroparesis      Gastroparesis 7/23/2012     Iron deficiency anemia 7/12/2013     Other chronic pain      Pancreatic disease     history of pancreatitis     Pancreatic divisum      Pancreatic divisum 10/1/2012     Pneumonia, organism unspecified 7/17/2013     PONV (postoperative nausea and vomiting)     Nausea     Portal vein thrombosis 6/29/2013     Recurrent acute pancreatitis 1/17/2013     Sphincter of Oddi  dysfunction 1/17/2013     Syncope     X 2     Type 2 diabetes mellitus without complications (H)              Past Surgical History:     Past Surgical History:   Procedure Laterality Date     APPENDECTOMY       CHOLECYSTECTOMY       COLONOSCOPY  5/21/2014    Procedure: COMBINED COLONOSCOPY, SINGLE BIOPSY/POLYPECTOMY BY BIOPSY;  Surgeon: Hugo Lind MD;  Location: [facility]     ENDOSCOPIC INSERTION TUBE GASTROSTOMY  2/5/2014    Procedure: ENDOSCOPIC INSERTION TUBE GASTROSTOMY;  Endoscopic percutaneous gastro-jejunostomy tube placement (removal of previous gastrostomy tube)  ;  Surgeon: Sharon Oh MD;  [facility]     ENDOSCOPIC RETROGRADE CHOLANGIOPANCREATOGRAM  9/9/2011    Procedure:ENDOSCOPIC RETROGRADE CHOLANGIOPANCREATOGRAM; Endoscopic Retrograde Cholangiopancreatogram with C Arm, Biliary Sphincterotomy, insertion Bile Duct Stent X 1; Surgeon:JABIER HEATH; Location:[facility]     ENDOSCOPIC RETROGRADE CHOLANGIOPANCREATOGRAM  12/15/2011    Procedure:ENDOSCOPIC RETROGRADE CHOLANGIOPANCREATOGRAM; Endoscopic Retrograde Cholangiopancreatogram (c-arm), with pancreatic stent placement, sphincterotomy; Surgeon:JABIER HEATH; Location[facility]     ENDOSCOPIC RETROGRADE CHOLANGIOPANCREATOGRAM  6/14/2012    Procedure: ENDOSCOPIC RETROGRADE CHOLANGIOPANCREATOGRAM;  endoscopic retrograde cholangiopancreatogram with pancreatic stent placement  (c-arm);  Surgeon: Jabier Heath MD;  [facility]: [facility]     ESOPHAGOSCOPY, GASTROSCOPY, DUODENOSCOPY (EGD), COMBINED  11/9/2011    Procedure:COMBINED ESOPHAGOSCOPY, GASTROSCOPY, DUODENOSCOPY (EGD); Surgeon:TANIKA MOHAN; [facility]     ESOPHAGOSCOPY, GASTROSCOPY, DUODENOSCOPY (EGD), COMBINED  5/21/2014    Procedure: COMBINED ESOPHAGOSCOPY, GASTROSCOPY, DUODENOSCOPY (EGD), BIOPSY SINGLE OR MULTIPLE;  Surgeon: Hugo Lind MD;  Location: [facility]     HC CHANGE GASTROSTOMY TUBE PERC, WO IMAGING OR ENDO GUIDE N/A 11/25/2014    Procedure: CHANGE GASTROSTOMY  TUBE WITHOUT SCOPE;  Surgeon: Sharon Oh MD;  Location: [facility]     HC IMPLANT PERIPH/GASTRIC NEUROSTIM/  1/11/2012    gastric pacemaker     HC REPLACE DUODENOSTOMY/JEJUNOSTOMY TUBE PERCUTANEOUS  2/12/2014    Procedure: REPLACE JEJUNOSTOMY TUBE, PERCUTANEOUS;  Surgeon: Sharon Oh MD;  Location: [facility]     HC UGI ENDOSCOPY W EUS  9/7/2011    Procedure:COMBINED ENDOSCOPIC ULTRASOUND, ESOPHAGOSCOPY, GASTROSCOPY, DUODENOSCOPY (EGD); Surgeon:TREE DEAN; [facility]     HC UGI ENDOSCOPY W EUS  12/4/2012    Procedure: COMBINED ENDOSCOPIC ULTRASOUND, ESOPHAGOSCOPY, GASTROSCOPY, DUODENOSCOPY (EGD);  Surgeon: Sharon Oh MD;  [facility]: [facility]     HC UGI ENDOSCOPY W EUS  5/8/2013    Procedure: COMBINED ENDOSCOPIC ULTRASOUND, ESOPHAGOSCOPY, GASTROSCOPY, DUODENOSCOPY (EGD);  Surgeon: Tree Dean MD;  Location: [facility]     HC UGI ENDOSCOPY W PLACEMENT GASTROSTOMY TUBE PERCUT N/A 2/19/2016    Procedure: COMBINED ESOPHAGOSCOPY, GASTROSCOPY, DUODENOSCOPY (EGD), PLACE PERCUTANEOUS ENDOSCOPIC GASTROSTOMY TUBE;  Surgeon: Sharon Oh MD;  Location: [facility]     LAPAROSCOPIC IMPLANT GASTRIC STIMULATOR  1/11/2012    Procedure:LAPAROSCOPIC IMPLANT GASTRIC STIMULATOR; LAPAROSCOPIC IMPLANT GASTRIC STIMULATOR ; Surgeon:CHONG HART; [facility]     LAPAROSCOPIC SALPINGO-OOPHORECTOMY       LAPAROSCOPIC SALPINGOTOMY      R     LAPAROTOMY EXPLORATORY  6/29/2013    Procedure: LAPAROTOMY EXPLORATORY;  Exploratory laparotomy, portal vein declotting with tissue plasminogen activator administration, thrombectomy and embolectomy of portal vein, intraoperative trans-sonic flow monitoring, intraoperative ultrasound;  Surgeon: Rajendra Cruz MD;  Location: [facility]     neurostimulator[  5/2011     PANCREATECTOMY, TRANSPLANT AUTO ISLET CELL, COMBINED  6/28/2013    Procedure: COMBINED PANCREATECTOMY, TRANSPLANT AUTO ISLET CELL;  Total Pancreatectomy, Auto Islet Cell Transplant             Anesthesia  General with block;  Surgeon: Rajendra Cruz MD;  Location: [facility]     TUBAL LIGATION               Social History:     Social History   Substance Use Topics     Smoking status: Never Smoker     Smokeless tobacco: Never Used     Alcohol use No             Family History:     Family History   Problem Relation Age of Onset     C.A.D. Father      GI: V, D, anorexia     Blood Disease Father      lymphoma -  from pulmonary fibrosis.     Malignant Hyperthermia Brother      DIABETES Maternal Grandmother      type 2 with older age     DIABETES Maternal Grandfather      type 2 with older age     Cancer - colorectal Maternal Grandfather      DIABETES Paternal Grandmother      type 2 with older age     Connective Tissue Disorder Sister      scleroderma     Family history reviewed and updated in [facility]          Immunizations:   Immunizations are up to date          Allergies:   All allergies reviewed and addressed          Medications:     Current Facility-Administered Medications   Medication     amitriptyline (ELAVIL) tablet 100 mg     amylase-lipase-protease (CREON 24) 28520 UNITS per EC capsule 72,000 Units     0.9% sodium chloride BOLUS     glucose 40 % gel 15-30 g    Or     dextrose 50 % injection 25-50 mL    Or     glucagon injection 1 mg     NaCl 0.45 % 1,000 mL with potassium chloride 30 mEq/L infusion     insulin 1 unit/1mL in saline (NovoLIN, HumuLIN Regular) drip - DKA algorithm     ondansetron (ZOFRAN) injection 4 mg     dextrose 5% and 0.45% NaCl + KCl 30 mEq/L infusion     [START ON 2017] cholecalciferol (vitamin D) tablet 8,000 Units     oxyCODONE (ROXICODONE) IR tablet 5 mg     naloxone (NARCAN) injection 0.1-0.4 mg     potassium chloride SA (K-DUR/KLOR-CON M) CR tablet 20-40 mEq     potassium chloride (KLOR-CON) Packet 20-40 mEq     potassium chloride 10 mEq in 100 mL intermittent infusion     potassium chloride 10 mEq in 100 mL intermittent infusion with 10 mg lidocaine     potassium chloride 20 mEq in  50 mL intermittent infusion     magnesium sulfate 4 g in 100 mL sterile water (premade)     sodium phosphate 15 mmol in D5W intermittent infusion     sodium phosphate 20 mmol in D5W intermittent infusion     sodium phosphate 25 mmol in D5W intermittent infusion             Review of Systems:   The Review of Systems is negative other than noted in the HPI          Physical Exam:   Vitals were reviewed  Temp: 97.7  F (36.5  C) Temp src: Axillary BP: 116/69 Pulse: 117 Heart Rate: 115 Resp: 16 SpO2: 100 % O2 Device: None (Room air) Oxygen Delivery: 1.5 LPM  Abdomen:     Scars consistent with prior abdominal surgery. Soft, non-tender, non-distended, no guarding/rebound. Reducible hernia defect. Gtube button in place.                   Data:   All laboratory data reviewed  All imaging studies reviewed by me.     Lactate: 1.2-1.4  Wbc 9  Ketones 6.1, AG 22  HgA1c 12.8  Liver US: portal patent with antegrade flow  Abd xray: no free air, stool burden throughout       A/P: 41F with complicated abdominal GI motility problems, s/p TPIAT, now admitted with DKA.  - Continue management of DKA. No acute abdominal pathology. Endocrine consult.  - Aggressive bowel regimen  - Consult GI to address motility issues  - Continue trending daily labs  - Will continue following the patient in the periphery    Jamaal Crawley MD, MPH  Transplant Fellow  Pager# [phone]    ATTESTATION    The patient has been seen and evaluated by me.   Vital signs, labs, medications and orders were reviewed.   When obtained, diagnostic images were reviewed by me and interpreted as above.    The care plan was discussed with the team and I agree with the findings and plan in this note.      Rajendra Cruz MD, MS  Associate Professor of Surgery   Division of Transplantation

## 2017-06-16 NOTE — CONSULTS
Diabetes Education-Ambulatory Insulin Pump Assessment    Received consult request to see this 41 year old female for pump assessment.  Patient with history of total pancreatectomy and auto-islet transplant, gastroparesis s/p gastric pacemaker, exocrine pancreatic insufficiency, chronic diarrhea, asplenia, history of portal vein thrombosis,presented in DKA.  Patient currently in MICU, on IV insulin infusion.  Patient has her Medtronic 551 pump in purse; she uses insulin aspart (NovoLog) in her pump.  Patient stated she dropped her pump down the stairs (she can't recall exactly when), and after that the meter-to-pump communication did not work and the pump did not record her bolus history.  She called [facility], and they sent a loaner pump, then returned her pump once repaired.  Her pump was received on 6/14/17.  Pump settings reviewed:  Basal rate (standard pattern):  1.00 units/hr from 9698-0334  Basal rates (Pattern A):  1.00 units/hr from 4671-9207  0.5 units/hr from 1559-9971    Patient states she typically runs the standard pattern, but puts in a temporary basal rate of 0.5 units/hr overnight to prevent hypoglycemia.    Bolus settings:  1 unit/30 grams CHO  1 unit to lower  mg/dL  Blood glucose target= mg/dL  Active insulin time= 3 hours    Patient states she typically does not use the bolus wizard/calculator.  She boluses manually, using 1 unit/15 grams CHO, and corrects with 2 units for blood glucose in 200's and 3 units for blood glucose in 300's.    She does have the continuous glucose sensor, but states does not use as she finds it uncomfortable to wear.    Patient uses the following pump supplies:  1.  Quik-set infusion sets (6 mm cannula)  2.  1.8 ml insulin reservoirs    She does not have extra supplies here with her, so provided with 2 infusion sets and 2 insulin reservoirs.  Lena Lara MS RN CDE [facility]  [phone]

## 2017-06-16 NOTE — PLAN OF CARE
Problem: Goal Outcome Summary  Goal: Goal Outcome Summary  Outcome: Improving  D: DKA  I/A: A&O.  Neurologically intact.  Afebrile, normotensive, SR-sinus tach and on RA.  Denies N/V/D or SOB.  Declined treatment for abdominal pain.  Latest VBG still shows metabolic acidosis despite anion gap closure.  Phos replaced x2 and K+ replaced x1.  Currently on algorithm 3 for insulin gtt.  Novolog added now that pt is able to eat.  D51/2NS + 30 mEq/L running at 150 mL/hour.  PICC placed for electrolyte replacement and frequent lab draws.  Good UO.  Endocrine consulted.  Talk of liver consult d/t abnormal LFTs.  Transplant team following-would like pt on Miralax d/t fecal impaction/constipation-RN notified [facility] (pt's new team).   P: Will continue to support and monitor.

## 2017-06-16 NOTE — PROGRESS NOTES
Paged to establish large bore access (18g or greater) on pt.   After eval of both arms, called for ultrasound nurse.

## 2017-06-16 NOTE — PLAN OF CARE
Problem: Goal Outcome Summary  Goal: Goal Outcome Summary  Outcome: No Change  DI: VSS. Communicates needs without difficulty. Denies SOB. Denies chest/shoulder/arm pain or discomfort. Insulin gtt stopped multiple times r/t low K+. Multiple lytes replaced this shift. Voiding patricia amounts of urine roughly q4 hours.  AP: Cont POC. Monitor. Cares as appropriate. Cont lytes replacement. FSG.

## 2017-06-16 NOTE — PROGRESS NOTES
Transfer accepted by [facility].  Ms. Tanner Elena is a 69 year old woman with a history of TIAPT and poorly controlled diabetes presenting with DKA.  She is unsure what could have triggered her DKA, no recent infections or medication noncompliance, and she reports no history of troubles with DKA.  Nevertheless, she is improving on DKA protocol.  Continuing plan initiated by ICU service.  To be formally staffed by medicine attending on 6/17.    Joanne Awad MD   PGY-3  [phone]      [facility]      MICU Progress/Transfer Note  Beverly Elena MRN: [id number]  1975  Date of Admission:6/15/2017  Primary care provider: Marleny Hathaway    Assessment and Plan:     Ms. Hart is a 41 year old female with a PMH of total pancreatectomy and auto islet transplant, poorly controlled diabetes, who presents with diabetic ketoacidosis.     Plan Today:  1) workup for non-anion gap metabolic acidosis as well as hypophosphatemia  2) PICC placement  3) Continue DKA protocol  4) Possible transfer to internal medicine     Neurologic  # Sedation: None    # Pain: primarily abdominal, likely related to DKA, resolved     Cardiovascular  #Sinus Tachycardia: improved  Likely secondary to hypovolemia in the setting of DKA and increased pain. EKG with sinus rhythm, T-wave flattening/inversions lateral leads.  Reports no chest pain or dyspnea.  -troponin negative x1     Respiratory  #Tachypnea: resolved  #Metabolic acidosis with respiratory compensation.   Compensatory in the setting of metabolic acidosis. Ph. 7.03 on venous blood gas, anion gap 22, bicarb 7, and pCO2 20.   - Resolved as acidosis improving  - Winter's formula off of ABG would expect -20 change in CO2 as compensation, ABG had pCO2 of 15 as expected for appropriate compensation     Gastrointestinal  #Elevated Transaminases  # History of portal venous thrombosis  Elevation in AST (256) and ALT (384), unclear etiology. Dx includes alcohol (occasional  wine), viral, drug (takes acetaminophen), hypoperfusion in setting of DKA. Has history of portal venous thrombosis following pancreatectomy  - U/S Abdomen Complete: 1. Nodular, heterogeneous, and hyperechoic liver, suggestive of hepatic parenchymal disease. No definitive findings to nodularity clearly indicating cirrhosis. 2. Normal Doppler evaluation of the visualized vasculature.  - LFTs downtrending, bilirubin normal on admission  - Hepatitis A, B, C serologies as none noted in chart  - If persistently elevated can consider hepatology consult as in/outpatient     #Abdominal Pain   #Nausea  Patient reports a few weeks of abdominal pain, worsening in the last few days. Likely possible gastroenteritis, versus secondary to DKA. No signs of surgical abdomen. Pain improved as DKA stabilized  - AXR 6/15/17: No obstruction, moderate fecal load  - U/S Abdomen Complete as above     #Hx of pancreatectomy s/p islet cell transplant   #Hx of chronic pancreatitis  Patient with history of chronic pancreatitis s/p pancreatectomy and auto islet cell transplant in 2013. As a result has diabetes and chronic malabsorption. On insulin pump and taking pancreatic enzymes.  - continue home creon supplements 15483 units with 1-2 tablets with meals  - Transplant surgery consult, service aware and will follow peripherally     #Gastroparesis w/pacer and peg tube   Patient has gastroparesis s/p laparoscopic placement of gastric pacemaker in 2012. Also has peg tube, which is currently not using.   - Follow up as outpatient with GI     Tube Feeds  None      Genitourinary  No acute issues      Infectious Disease  No clear sign of infection. Afebrile, normal WBC.     Hematology  No active issues. Cell lines normal      Endocrine  #Type II Diabetes s/p pancreatectomy and auto-islet cell transplant  #DKA   Patient with poorly controlled diabetes s/p autoislet cell transplant. Complicated by gastroparesis and variable gastric output. HgbA1c is 12.8.  Has home insulin pump with baseline rate of 1-2units/hr and boluses 6 x daily, reports wide variance in daily requirements (30-60 total daily units). Presented with blood glucose of 312 (reports home levels varying from 60 to 600), in acute ketoacidosis.   -dka protocol  -insulin drip  -maintenance fluids  -Monitor electrolytes closely   -endocrine consult      Renal/Electolytes/FEN   I/O last 3 completed shifts:  In: 5170.89 [I.V.:4170.89; IV Piggyback:1000]  Out: 2475 [Urine:2475]    No CKD, no CAMERON on admission     #Anion Gap Metabolic Acidosis  Secondary to DKA. Initial VBG with pH 7.03, Bicarb 5.  Serum bicarb 7, AGAP 22. Ketonuria and serum ketones 6.1.  Lactic acid wnl.  Currently: AGAP resolved, ketones downtrending to 1.1, acidosis improved (last venous pH 7.26) and serum bicarb now 12.  - DKA protocol  - Received fluid resuscitation and initiated on the insulin drip.  -BMP q2 hours  -Ketones q2 hours   -phosphorous q2 hours  - Repeat VBG after PICC placed    # Hyperchloremic non anion gap metabolic acidosis:  Has multiple possible causes including diarrhea, dilutional 2/2 saline infusion, and normal result of correction of DKA (loss of 'potential bicarbonate' as ketoacids excreted in urine and not metabolized to reproduce bicarbonate).  Likely combination of the above etiologies, if so should auto-correct with stabilization.    However, given initial urine pH of 5.5 (would expect lower given acidemia), her significant hypophosphatemia (unclear if underlying chronic as below) and ongoing non- AGAP acidosis, will evaluate for other causes as well including RTA.  - Repeat VBG  - Urinalysis, urine Na, K, Cl, and osmol: To calculate urine anion gap and urine osmol gap  Update: Urine anion gap (-8)    # Hypophosphatemia:  Unclear if underlying chronic condition secondary to either poor nutrition (although albumin 4.0 on admission (2.9 after fluid resuscitation)) or could have chronic renal losses.  Has acute  hypophosphatemia likely due to insulin infusion driving phosphate intracellularly (similar to refeeding mechanism)  Reports no worsening fatigue, not in respiratory distress  - Protocol in place  - PICC being placed, will be able to replete faster  - Will calculate urine fractional excretion of phosphate (labs pending)  Update: Fractional excretion Phos = 53% (expect ~ 5% or less), implying significant renal loss of phosphate.  Whether polyuria 2/2 glucosuria and fluid resuscitation can cause this is unclear, consider further evaluation.    # Hypocalcemia:  Was 6.8 with albumin of 2.9 (corrected Ca would still be ~ 7.7).  No hypomagnesemia.  - Ionized calcium pending  - With large fluid and electrolyte shifts could be factitious, will trend and replace as appropriate, may warrant further eval if persistent    #Sodium  Corrected sodium in the setting of hyperglycemia is 138. Will continue to monitor with q2 hours BMP  No confusion     #Potassium  3.4 on presentation to clinic. In the setting of DKA total body potassium stores potentially low despite normal serum potassium. Will continue to monitor. ECG as above with flattened T waves/ T wave inversions on lateral leads   -BMP q2 hours   - Fluids with 30meq K  - protocol in place      Skin Care  No acute issues      F: Now on D5 1/2NS K30 @ 250 ml/hr as glucose <200  E:  Multiple abnormalities as above, on protocols  N: NPO  L: PEG tube, PICC to be placed (6/16), PIVx2  Prophylaxis:  DVT: none   GI: 40 mg IV protonix  Family:  updated day prior, discussed plan with patient today  Disposition: Possible transfer to medicine  Code Status: Full    Patient was seen and discussed with attending physician Dr. Jose D Dorantes MD, MPH  PGY-1 [facility]  Pager: [phone]    Interval History:     Beverly Elena did well overnight, as acidosis improving she feels less nauseated, abdominal pain resolving.  Has good urine output, is no  "longer tachypneic or tacycardic and has no chest pain or difficulty breathing.  No other complaints.    4 Point Review of systems including CV, Respiratory, GI and  were negative unless otherwise noted.    PHYSICAL EXAM  /73 (BP Location: Right arm)  Pulse 117  Temp 97.7  F (36.5  C) (Oral)  Resp 14  Ht 1.549 m (5' 1\")  Wt 56.7 kg (125 lb)  LMP  (LMP Unknown)  SpO2 100%  BMI 23.62 kg/m2  I/O last 3 completed shifts:  In: 5170.89 [I.V.:4170.89; IV Piggyback:1000]  Out: 2475 [Urine:2475]    GEN: Awake, resting comfortably in bed, on room air and not sedated  EYES: pupils equal and round, EOMI, Anicteric sclera.   CV: RRR, no gallops, rubs, or mumurs  PULM: Good air entry bilaterally, no crackles or wheezing, symmetric chest rise  GI: BS+, soft, significantly less tender than day prior, mild epigastric/RUQ tenderness, no rebound or guarding  : Voiding spontaneously with good urine output  EXTREMITIES: trace pedal edema, moving all extremities, peripheral pulses intact  NEURO: Awake, alert, oriented x4, cranial nerves II-XII grossly intact, no motor-sensory deficits or lateralization noted  SKIN: No rashes, sores or ulcerations noted    DIAGNOSTIC STUDIES  ROUTINE ICU LABS (Last four results)  CMP  Recent Labs  Lab 06/16/17  0619 06/16/17  0353 06/16/17  0200 06/16/17  0003  06/15/17  1805  06/15/17  0816    141 141 136  < > 139  --  135   POTASSIUM 3.4 3.8 3.0* 3.3*  < > 3.2*  < > 3.4   CHLORIDE 115* 119* 119* 114*  < > 116*  --  105   CO2 12* 13* 12* 12*  < > 11*  --  7*   ANIONGAP 11 9 10 10  < > 12  --  22*   * 214* 219* 324*  < > 180*  --  312*   BUN 2* 2* 2* 3*  < > 5*  --  7   CR 0.50* 0.50* 0.50* 0.56  < > 0.55  --  0.72   GFRESTIMATED >90Non  GFR Calc >90Non  GFR Calc >90Non  GFR Calc >90Non  GFR Calc  < > >90Non  GFR Calc  --  89   GFRESTBLACK >90African American GFR Calc >90African American GFR Calc " >90African American GFR Calc >90African American GFR Calc  < > >90African American GFR Calc  --  >90African American GFR Calc   JESSICA 7.1* 7.1* 7.0* 6.9*  < > 7.2*  --  8.4*   MAG  --   --   --   --   --  1.6  --   --    PHOS 0.8* 1.2* 1.6* 1.2*  < > 0.8*  --   --    PROTTOTAL  --   --   --   --   --  5.4*  --  6.8   ALBUMIN  --   --   --   --   --  2.9*  --  4.0   BILITOTAL  --   --   --   --   --  0.4  --  0.7   ALKPHOS  --   --   --   --   --  112  --  142   AST  --   --   --   --   --  129*  --  256*   ALT  --   --   --   --   --  246*  --  384*   < > = values in this interval not displayed.  CBC  Recent Labs  Lab 06/15/17  1805 06/15/17  0816   WBC 9.6 9.9   RBC 4.01 4.45   HGB 12.1 13.6   HCT 35.6 41.2   MCV 89 93   MCH 30.2 30.6   MCHC 34.0 33.0   RDW 15.3* 15.1*    244     INRNo lab results found in last 7 days.  Arterial Blood Gas  Recent Labs  Lab 06/16/17  0003 06/15/17  1938 06/15/17  1602   PH  --   --  7.18*   PCO2  --   --  15*   PO2  --   --  152*   HCO3  --   --  6*   O2PER 21% 21 2L       MICROBIOLOGY  All cultures:  No results for input(s): CULT in the last 168 hours.    IMAGING  Recent Results (from the past 48 hour(s))   XR Chest Port 1 View    Narrative    EXAMINATION: XR CHEST PORT 1 VW  6/15/2017 10:57 AM      CLINICAL HISTORY: Worsening dyspnea     COMPARISON: 7/19/2013        FINDINGS:  Single frontal view of the chest. Neurostimulator leads projecting  over high/mid thoracic spine. Removal of right IJ central venous  catheter. Surgical clips in partially visualized upper abdomen.    Cardiac silhouette within normal limits. Bronchovascular markings are  unremarkable. No focal airspace opacity. No pleural effusion or  pneumothorax. Partially visualized upper abdomen is unremarkable.        Impression    IMPRESSION:  No acute airspace disease.    I have personally reviewed the examination and initial interpretation  and I agree with the findings.    CUATE KELLER MD   XR Abdomen Port 1  View    Narrative    XR ABDOMEN PORT F1 VW 6/15/2017 4:04 PM    History: Abdominal pain    Comparison: Fluoroscopy images from 2/19/2016    Findings: 2 supine views of the abdomen. Gastric pacemaker and spinal  cord stimulator devices project over the mid abdomen. G-tube over the  stomach. Suture lines in the upper abdomen and mid abdomen.  Nonobstructive bowel gas pattern. Moderate fecal load.      Impression    Impression:  Nonobstructive bowel gas pattern. Moderate fecal load.    I have personally reviewed the examination and initial interpretation  and I agree with the findings.    RAJEEV GRIFFIN   US Abdomen Complete w Doppler Complete    Narrative    EXAMINATION: US ABDOMEN COMPLETE WITH DOPPLER, 6/15/2017 5:30 PM     COMPARISON: 8/19/2013    HISTORY: patient with history of portal venous thrombosis, presents  with transaminitis    TECHNIQUE: The abdomen was scanned in standard fashion with  specialized ultrasound transducer(s) using both gray-scale, color  Doppler, and spectral flow techniques.    Findings:    Liver: The liver demonstrates heterogeneous, somewhat echogenic, and  nodular appearance of the parenchyma. It is hyperechoic with respect  to the right renal cortex. No evidence of a focal hepatic mass. The  liver measures 13.6 cm in the longitudinal dimension.    Extrahepatic portal vein flow is antegrade, measuring 36 cm/sec.  Right portal vein flow is antegrade, measuring 22 cm/sec.  Left portal vein flow is antegrade, measuring 14 cm/sec.      Flow in the hepatic artery is towards the liver and:  161 cm/sec peak systolic  0.89 resistive index.     The splenic vein is patent and flow is towards the liver.  The left,  middle, and right hepatic veins are patent with flow towards the IVC.  The IVC is patent with flow towards the heart.   The visualized aorta  is not dilated.    Gallbladder: Surgically absent.    Bile Ducts: Both the intra- and extrahepatic biliary system are of  normal caliber.  The  common bile duct measures 3 mm in diameter.    Pancreas: Not well visualized due to overlying bowel gas.    Right kidney: Visualized parenchyma is normal.    Left kidney: Not visualized.    Spleen: Not visualized.    Fluid: No evidence of ascites or pleural effusions.        Impression    Impression:   1. Nodular, heterogeneous, and hyperechoic liver, suggestive of  hepatic parenchymal disease. No definitive findings to nodularity  clearly indicating cirrhosis.  2. Normal Doppler evaluation of the visualized vasculature.  3. Due to overlying bowel gas, the pancreas, left kidney, and spleen  are not visualized. Although poorly visualized, the right kidney  appears normal.    I have personally reviewed the examination and initial interpretation  and I agree with the findings.    SULMA REA MD       Peripheral IV 06/15/17 Right Lower forearm (Active)   Site Assessment [facility] 6/16/2017  8:00 AM   Line Status Infusing 6/16/2017  8:00 AM   Phlebitis Scale 0-->no symptoms 6/16/2017  8:00 AM   Infiltration Scale 0 6/16/2017  8:00 AM   Extravasation? No 6/16/2017  8:00 AM   Number of days:1       Peripheral IV 06/15/17 Left (Active)   Site Assessment [facility] 6/16/2017  8:00 AM   Line Status Saline locked 6/16/2017  8:00 AM   Phlebitis Scale 0-->no symptoms 6/16/2017  8:00 AM   Infiltration Scale 0 6/16/2017  8:00 AM   Extravasation? No 6/16/2017  8:00 AM   Number of days:1       Peripheral IV 06/15/17 Right Upper forearm (Active)   Site Assessment [facility] 6/16/2017  8:00 AM   Line Status Infusing 6/16/2017  8:00 AM   Phlebitis Scale 0-->no symptoms 6/16/2017  8:00 AM   Infiltration Scale 0 6/16/2017  8:00 AM   Extravasation? No 6/16/2017  8:00 AM   Dressing Intervention New dressing  6/16/2017 12:00 AM   Number of days:1

## 2017-06-16 NOTE — PROGRESS NOTES
"[facility]      MICU Progress Note  Beverly Elena MRN: [id number]  1975  Date of Admission:6/15/2017  Primary care provider: Marleny Hathaway      Interval History:     No acute O/N events. Abdominal pain improving with resolving DKA. Electrolytes with repletion O/N, per DKA protocol. Has been having good UOP, not yet with BM since hospitalization.    /71  Pulse 117  Temp 98.8  F (37.1  C) (Oral)  Resp 16  Ht 1.549 m (5' 1\")  Wt 56.7 kg (125 lb)  LMP  (LMP Unknown)  SpO2 100%  BMI 23.62 kg/m2    PHYSICAL EXAM  General: Alert and oriented x4, interactive, lying in bed. Without distress.  Skin: warm and dry, not jaundiced, no rash, no ecchymoses  HEENT: Atraumatic, MMM, PERRLA, EOMI, neck supple  CV: RRR, no murmur, rubs, or gallops  Resp: Clear to auscultation bilaterally, no wheezes, crackles  Abd: Soft, non-tender, non-distended, BS+, no masses appreciated  Extremities: warm and well perfused, palpable pulses, no edema  Neuro: CN II-XII grossly intact, no lateralizing symptoms or focal neurologic deficits  Psych: normal mood and affect    Temp  Av.6  F (36.4  C)  Min: 96.8  F (36  C)  Max: 98.3  F (36.8  C)      Pulse  Av  Min: 117  Max: 117 Resp  Av.5  Min: 12  Max: 25  SpO2  Av %  Min: 100 %  Max: 100 %         Intake/Output Summary (Last 24 hours) at 17 1157  Last data filed at 17 1100   Gross per 24 hour   Intake          6747.89 ml   Output             4575 ml   Net          2172.89 ml     Resp: 16    Assessment and Plan:     Beverly Elena is a 42 yo female with PMHx of chronic relapsing pancreatitis d/t pancreatic divisum s/p TPAIT in , gastroparesis (s/p gastric pace maker), s/p G-tube placement, portal vein thrombosis, GERD, and endometriosis admitted to the MICU on 6/15 for management of DKA.    Plan Today:  - PICC access today  - Increase phos replacement  - Continue DKA protocol   - Transfer to Medicine " floor      ===NEURO/PSYCH===  # Sedation  None     # Pain control  Improving; has not needed PRN Oxycodone O/N.  - Oxycodone 30mg q4hrs PRN     #Headaches - Chronic  - Continue PTA amitriptyline      ===CARDIOVASCULAR===  # Non-specific T-wave inversion  New findings on EKG specifically in anterolateral leads. With tachycardia on admission, but resolved. Troponins negative.      ===PULMONARY===  # Dyspnea - Resolved  Likely related to abdominal pain and situational anxiety. Also compensatory from anion-gap metabolic acidosis. Now satting at 100% O2 room air.  - Continuous SpO2 monitoring.     ===GASTROINTESTINAL===  # Periumbilical abdominal pain - Improved  - Likely related to DKA. Abdominal exam without evidence of acute abdomen today. No evidence of obstruction on abdominal XR. Considered underlying SBO (given high degree of intraabdominal surgery), mesenteric ischemia, abscess/infection of G-tube (less likely since afebrile, nml WBC and lactate).     # Transaminitis - Improving:  AST/ALT: 256/384 --> 129/246. Unclear etiology at this time, less likely to be related to DKA. Pt with a prior history of portal vein thrombosis, not current anticoagulated. With abdominal U/S without evidence of new thrombosis. DDx includes recurrence of portal vein thrombosis (no findings on US), malignancy (given weight loss and fatigue over past 2 months), infectious (cholecystitis, hepatitis, mononucleosis, hepatic abscess, etc) less likely as afebrile and without leukocytosis). Less likely biliary related given normal alk phos, T. Bili.  - Check hepatitis serology today (HepA Ab, HBcAg/Ab, HBsAg/Ab)     # s/p TPAIT (2013) 2/2 chronic pancreatitis  - Transplant surgery consulted, appreciated recs. No acute surgical intervention at this time.  - Continue PTA Amylase/Lipase/Protease 54631S  - PTA Vit D3 8000U PO   - Zofran q4-6hrs PRN  - Metoclopramide 10mg q6hrs PRN     # Gastroparesis - Chronic  S/p gastric pacer placement in  2013.     # Nutrition:   - Continue NPO (d/t gastroparesis)     ===RENAL===  # Anion gap metabolic acidosis - Resolved  Caused by DKA. Anion gap now closed (11).  - Continue DKA protocol as below in endocrine  - BMP, Mg, Phos q2hrs     # Non-anion gap metabolic acidosis - new  Likely related to hyperchloremia 2/2 to fluid resuscitation for DKA treatment. Other etiologies include chronic state d/t diarrhea, Type RTA (though less likely with normal Cr).  - Urine Na, Cl, Osm, Phos, Cr today  - Repeat UA  - Repeat VBG     # Hypophosphatemia - New  Phos: 0.8 <-- 1.2 this AM. Possibly an underlying chronic issue from GI losses. In the acute setting, would be explained by insulin shifting of phosphate into the intracellular space as apart of DKA correction.  - Phos checks q2hrs  - Increased repletion rate with potassium phosphate PRN, can infuse higher rate through PICC line.    # Hypokalemia  - K: 3.4 on AM labs. Continue repletion with KCl per DKA protocol.    Fluids: D5+1/2NS @ 250cc/hr + 30mEq KCl    ===HEME/ONC===  # No active issues at this time     ===ENDOCRINE===  # DKA  Etiology most likely d/t poor glucose control. HgbA1c on 6/15 outpt appointment was 12.8. s/p 2L NS bolus in ED, was started on insulin gtt since K>3.3, no additional K replacement needed. Glucose trending down, now at 190. Since < 200, can start to bridge to subQ insulin.  - Continue insulin gtt @ 0.1 U/kg/hr, begin bridge to subQ insulin. Transition to 0.02-0.05 U/kg/hr SubQ.  - 1/2 NS @ 250 cc/hr + 30 mEq K  - Glucose checks q1hr  - Electrolyte management as above  - Endocrinology consulted, appreciated recs     ===INFECTIOUS DISEASE===  # No active issues at this time  - UCx NGTD.     # Antimicrobials:  None      ===SKIN/MSK===  # No active issues at this time    Prophylaxis:  DVT: None   GI: IV PPI  Family: Updated at bedside  Disposition: Pending normalization , can transfer to floor  Code Status: FULL    Patient was seen and discussed with  attending physician Dr. Jeffery, who agrees with above assessment and plan.    Antonio Merchant, MS4  [facility] Medical Student  P: [phone]      Objective:        DIAGNOSTIC STUDIES  ROUTINE ICU LABS (Last four results)  CMP  Recent Labs  Lab 06/16/17  0810 06/16/17  0619 06/16/17  0353 06/16/17  0200 06/16/17  0003  06/15/17  1805  06/15/17  0816   NA  --  137 141 141 136  < > 139  --  135   POTASSIUM  --  3.4 3.8 3.0* 3.3*  < > 3.2*  < > 3.4   CHLORIDE  --  115* 119* 119* 114*  < > 116*  --  105   CO2  --  12* 13* 12* 12*  < > 11*  --  7*   ANIONGAP  --  11 9 10 10  < > 12  --  22*   GLC  --  236* 214* 219* 324*  < > 180*  --  312*   BUN  --  2* 2* 2* 3*  < > 5*  --  7   CR  --  0.50* 0.50* 0.50* 0.56  < > 0.55  --  0.72   GFRESTIMATED  --  >90Non  GFR Calc >90Non  GFR Calc >90Non  GFR Calc >90Non  GFR Calc  < > >90Non  GFR Calc  --  89   GFRESTBLACK  --  >90African American GFR Calc >90African American GFR Calc >90African American GFR Calc >90African American GFR Calc  < > >90African American GFR Calc  --  >90African American GFR Calc   JESSICA  --  7.1* 7.1* 7.0* 6.9*  < > 7.2*  --  8.4*   MAG 2.6*  --   --   --   --   --  1.6  --   --    PHOS  --  0.8* 1.2* 1.6* 1.2*  < > 0.8*  --   --    PROTTOTAL  --   --   --   --   --   --  5.4*  --  6.8   ALBUMIN  --   --   --   --   --   --  2.9*  --  4.0   BILITOTAL  --   --   --   --   --   --  0.4  --  0.7   ALKPHOS  --   --   --   --   --   --  112  --  142   AST  --   --   --   --   --   --  129*  --  256*   ALT  --   --   --   --   --   --  246*  --  384*   < > = values in this interval not displayed.  CBC    Recent Labs  Lab 06/15/17  1805 06/15/17  0816   WBC 9.6 9.9   RBC 4.01 4.45   HGB 12.1 13.6   HCT 35.6 41.2   MCV 89 93   MCH 30.2 30.6   MCHC 34.0 33.0   RDW 15.3* 15.1*    244     INRNo lab results found in last 7 days.  Arterial Blood Gas  Recent Labs  Lab 06/16/17  1115 06/16/17  0003  06/15/17  1938 06/15/17  1602   PH  --   --   --  7.18*   PCO2  --   --   --  15*   PO2  --   --   --  152*   HCO3  --   --   --  6*   O2PER 21.0 21% 21 2L     IMAGING  No new imaging

## 2017-06-17 LAB
ANION GAP SERPL CALCULATED.3IONS-SCNC: 6 MMOL/L (ref 3–14)
ANION GAP SERPL CALCULATED.3IONS-SCNC: 6 MMOL/L (ref 3–14)
ANION GAP SERPL CALCULATED.3IONS-SCNC: 8 MMOL/L (ref 3–14)
BUN SERPL-MCNC: <1 MG/DL (ref 7–30)
CALCIUM SERPL-MCNC: 7.4 MG/DL (ref 8.5–10.1)
CALCIUM SERPL-MCNC: 7.4 MG/DL (ref 8.5–10.1)
CALCIUM SERPL-MCNC: 7.5 MG/DL (ref 8.5–10.1)
CHLORIDE SERPL-SCNC: 118 MMOL/L (ref 94–109)
CHLORIDE SERPL-SCNC: 120 MMOL/L (ref 94–109)
CHLORIDE SERPL-SCNC: 121 MMOL/L (ref 94–109)
CO2 SERPL-SCNC: 16 MMOL/L (ref 20–32)
CO2 SERPL-SCNC: 17 MMOL/L (ref 20–32)
CO2 SERPL-SCNC: 17 MMOL/L (ref 20–32)
CORTIS SERPL-MCNC: 5.4 UG/DL (ref 4–22)
CREAT SERPL-MCNC: 0.4 MG/DL (ref 0.52–1.04)
CREAT SERPL-MCNC: 0.42 MG/DL (ref 0.52–1.04)
CREAT SERPL-MCNC: 0.44 MG/DL (ref 0.52–1.04)
ERYTHROCYTE [DISTWIDTH] IN BLOOD BY AUTOMATED COUNT: 15.4 % (ref 10–15)
GFR SERPL CREATININE-BSD FRML MDRD: ABNORMAL ML/MIN/1.7M2
GLUCOSE BLDC GLUCOMTR-MCNC: 107 MG/DL (ref 70–99)
GLUCOSE BLDC GLUCOMTR-MCNC: 114 MG/DL (ref 70–99)
GLUCOSE BLDC GLUCOMTR-MCNC: 117 MG/DL (ref 70–99)
GLUCOSE BLDC GLUCOMTR-MCNC: 122 MG/DL (ref 70–99)
GLUCOSE BLDC GLUCOMTR-MCNC: 129 MG/DL (ref 70–99)
GLUCOSE BLDC GLUCOMTR-MCNC: 129 MG/DL (ref 70–99)
GLUCOSE BLDC GLUCOMTR-MCNC: 136 MG/DL (ref 70–99)
GLUCOSE BLDC GLUCOMTR-MCNC: 138 MG/DL (ref 70–99)
GLUCOSE BLDC GLUCOMTR-MCNC: 138 MG/DL (ref 70–99)
GLUCOSE BLDC GLUCOMTR-MCNC: 144 MG/DL (ref 70–99)
GLUCOSE BLDC GLUCOMTR-MCNC: 160 MG/DL (ref 70–99)
GLUCOSE BLDC GLUCOMTR-MCNC: 165 MG/DL (ref 70–99)
GLUCOSE BLDC GLUCOMTR-MCNC: 171 MG/DL (ref 70–99)
GLUCOSE BLDC GLUCOMTR-MCNC: 76 MG/DL (ref 70–99)
GLUCOSE BLDC GLUCOMTR-MCNC: 99 MG/DL (ref 70–99)
GLUCOSE SERPL-MCNC: 106 MG/DL (ref 70–99)
GLUCOSE SERPL-MCNC: 114 MG/DL (ref 70–99)
GLUCOSE SERPL-MCNC: 187 MG/DL (ref 70–99)
HCT VFR BLD AUTO: 28.2 % (ref 35–47)
HGB BLD-MCNC: 10 G/DL (ref 11.7–15.7)
KETONES BLD-SCNC: 0 MMOL/L (ref 0–0.6)
KETONES BLD-SCNC: 0 MMOL/L (ref 0–0.6)
MCH RBC QN AUTO: 30.3 PG (ref 26.5–33)
MCHC RBC AUTO-ENTMCNC: 35.5 G/DL (ref 31.5–36.5)
MCV RBC AUTO: 86 FL (ref 78–100)
PHOSPHATE SERPL-MCNC: 1.7 MG/DL (ref 2.5–4.5)
PHOSPHATE SERPL-MCNC: 2.3 MG/DL (ref 2.5–4.5)
PHOSPHATE SERPL-MCNC: 3.1 MG/DL (ref 2.5–4.5)
PLATELET # BLD AUTO: 101 10E9/L (ref 150–450)
POTASSIUM SERPL-SCNC: 3.1 MMOL/L (ref 3.4–5.3)
POTASSIUM SERPL-SCNC: 3.7 MMOL/L (ref 3.4–5.3)
POTASSIUM SERPL-SCNC: 3.9 MMOL/L (ref 3.4–5.3)
RBC # BLD AUTO: 3.3 10E12/L (ref 3.8–5.2)
SODIUM SERPL-SCNC: 142 MMOL/L (ref 133–144)
SODIUM SERPL-SCNC: 142 MMOL/L (ref 133–144)
SODIUM SERPL-SCNC: 144 MMOL/L (ref 133–144)
WBC # BLD AUTO: 4.1 10E9/L (ref 4–11)
ZINC SERPL-MCNC: 97 UG/ML

## 2017-06-17 PROCEDURE — 84100 ASSAY OF PHOSPHORUS: CPT | Performed by: INTERNAL MEDICINE

## 2017-06-17 PROCEDURE — 25000128 H RX IP 250 OP 636: Performed by: STUDENT IN AN ORGANIZED HEALTH CARE EDUCATION/TRAINING PROGRAM

## 2017-06-17 PROCEDURE — 25800025 ZZH RX 258

## 2017-06-17 PROCEDURE — 80048 BASIC METABOLIC PNL TOTAL CA: CPT | Performed by: CLINICAL NURSE SPECIALIST

## 2017-06-17 PROCEDURE — 25000125 ZZHC RX 250: Performed by: STUDENT IN AN ORGANIZED HEALTH CARE EDUCATION/TRAINING PROGRAM

## 2017-06-17 PROCEDURE — 80048 BASIC METABOLIC PNL TOTAL CA: CPT | Performed by: STUDENT IN AN ORGANIZED HEALTH CARE EDUCATION/TRAINING PROGRAM

## 2017-06-17 PROCEDURE — 25000132 ZZH RX MED GY IP 250 OP 250 PS 637: Performed by: INTERNAL MEDICINE

## 2017-06-17 PROCEDURE — 82010 KETONE BODYS QUAN: CPT | Performed by: CLINICAL NURSE SPECIALIST

## 2017-06-17 PROCEDURE — 85027 COMPLETE CBC AUTOMATED: CPT | Performed by: INTERNAL MEDICINE

## 2017-06-17 PROCEDURE — 25000132 ZZH RX MED GY IP 250 OP 250 PS 637: Performed by: STUDENT IN AN ORGANIZED HEALTH CARE EDUCATION/TRAINING PROGRAM

## 2017-06-17 PROCEDURE — 12000001 ZZH R&B MED SURG/OB UMMC

## 2017-06-17 PROCEDURE — 99233 SBSQ HOSP IP/OBS HIGH 50: CPT | Mod: GC | Performed by: INTERNAL MEDICINE

## 2017-06-17 PROCEDURE — 36592 COLLECT BLOOD FROM PICC: CPT | Performed by: STUDENT IN AN ORGANIZED HEALTH CARE EDUCATION/TRAINING PROGRAM

## 2017-06-17 PROCEDURE — 00000146 ZZHCL STATISTIC GLUCOSE BY METER IP

## 2017-06-17 PROCEDURE — 84100 ASSAY OF PHOSPHORUS: CPT | Performed by: STUDENT IN AN ORGANIZED HEALTH CARE EDUCATION/TRAINING PROGRAM

## 2017-06-17 PROCEDURE — 84100 ASSAY OF PHOSPHORUS: CPT | Performed by: CLINICAL NURSE SPECIALIST

## 2017-06-17 PROCEDURE — 82010 KETONE BODYS QUAN: CPT | Performed by: INTERNAL MEDICINE

## 2017-06-17 PROCEDURE — 80048 BASIC METABOLIC PNL TOTAL CA: CPT | Performed by: INTERNAL MEDICINE

## 2017-06-17 PROCEDURE — 82533 TOTAL CORTISOL: CPT | Performed by: CLINICAL NURSE SPECIALIST

## 2017-06-17 RX ORDER — DEXTROSE MONOHYDRATE 25 G/50ML
25-50 INJECTION, SOLUTION INTRAVENOUS
Status: DISCONTINUED | OUTPATIENT
Start: 2017-06-17 | End: 2017-06-18 | Stop reason: HOSPADM

## 2017-06-17 RX ORDER — TRAZODONE HYDROCHLORIDE 100 MG/1
100 TABLET ORAL
Status: DISCONTINUED | OUTPATIENT
Start: 2017-06-17 | End: 2017-06-18 | Stop reason: HOSPADM

## 2017-06-17 RX ORDER — DEXTROSE MONOHYDRATE, SODIUM CHLORIDE, AND POTASSIUM CHLORIDE 50; 1.49; 4.5 G/1000ML; G/1000ML; G/1000ML
INJECTION, SOLUTION INTRAVENOUS
Status: COMPLETED
Start: 2017-06-17 | End: 2017-06-17

## 2017-06-17 RX ORDER — PANTOPRAZOLE SODIUM 40 MG/1
40 TABLET, DELAYED RELEASE ORAL EVERY MORNING
Status: DISCONTINUED | OUTPATIENT
Start: 2017-06-18 | End: 2017-06-18 | Stop reason: HOSPADM

## 2017-06-17 RX ORDER — NICOTINE POLACRILEX 4 MG
15-30 LOZENGE BUCCAL
Status: DISCONTINUED | OUTPATIENT
Start: 2017-06-17 | End: 2017-06-18 | Stop reason: HOSPADM

## 2017-06-17 RX ADMIN — POTASSIUM CHLORIDE 20 MEQ: 29.8 INJECTION, SOLUTION INTRAVENOUS at 05:58

## 2017-06-17 RX ADMIN — TRAZODONE HYDROCHLORIDE 100 MG: 100 TABLET ORAL at 23:48

## 2017-06-17 RX ADMIN — AMITRIPTYLINE HYDROCHLORIDE 100 MG: 100 TABLET, FILM COATED ORAL at 23:47

## 2017-06-17 RX ADMIN — SODIUM CHLORIDE, PRESERVATIVE FREE 5 ML: 5 INJECTION INTRAVENOUS at 13:18

## 2017-06-17 RX ADMIN — VITAMIN D, TAB 1000IU (100/BT) 8000 UNITS: 25 TAB at 09:24

## 2017-06-17 RX ADMIN — POTASSIUM CHLORIDE, DEXTROSE MONOHYDRATE AND SODIUM CHLORIDE 1000 ML: 150; 5; 450 INJECTION, SOLUTION INTRAVENOUS at 13:48

## 2017-06-17 RX ADMIN — ONDANSETRON 4 MG: 2 INJECTION INTRAMUSCULAR; INTRAVENOUS at 19:20

## 2017-06-17 RX ADMIN — PANCRELIPASE 72000 UNITS: 24000; 76000; 120000 CAPSULE, DELAYED RELEASE PELLETS ORAL at 18:38

## 2017-06-17 RX ADMIN — PANTOPRAZOLE SODIUM 40 MG: 40 INJECTION, POWDER, FOR SOLUTION INTRAVENOUS at 09:18

## 2017-06-17 RX ADMIN — SODIUM CHLORIDE, PRESERVATIVE FREE 3 ML: 5 INJECTION INTRAVENOUS at 22:01

## 2017-06-17 RX ADMIN — PANCRELIPASE 72000 UNITS: 24000; 76000; 120000 CAPSULE, DELAYED RELEASE PELLETS ORAL at 10:38

## 2017-06-17 RX ADMIN — POTASSIUM PHOSPHATE, MONOBASIC AND POTASSIUM PHOSPHATE, DIBASIC 20 MMOL: 224; 236 INJECTION, SOLUTION INTRAVENOUS at 05:03

## 2017-06-17 RX ADMIN — POTASSIUM CHLORIDE 20 MEQ: 29.8 INJECTION, SOLUTION INTRAVENOUS at 03:50

## 2017-06-17 RX ADMIN — HUMAN INSULIN: 100 INJECTION, SOLUTION SUBCUTANEOUS at 01:16

## 2017-06-17 ASSESSMENT — ACTIVITIES OF DAILY LIVING (ADL)
COGNITION: 0 - NO COGNITION ISSUES REPORTED
TRANSFERRING: 0-->INDEPENDENT
RETIRED_EATING: 0-->INDEPENDENT
TOILETING: 0-->INDEPENDENT
BATHING: 0-->INDEPENDENT
RETIRED_COMMUNICATION: 0-->UNDERSTANDS/COMMUNICATES WITHOUT DIFFICULTY
AMBULATION: 0-->INDEPENDENT
SWALLOWING: 0-->SWALLOWS FOODS/LIQUIDS WITHOUT DIFFICULTY
DRESS: 0-->INDEPENDENT
FALL_HISTORY_WITHIN_LAST_SIX_MONTHS: NO

## 2017-06-17 ASSESSMENT — PAIN DESCRIPTION - DESCRIPTORS
DESCRIPTORS: ACHING
DESCRIPTORS: ACHING

## 2017-06-17 NOTE — PROGRESS NOTES
"Endocrinology Progress Note  Beverly Elena MRN# [id number]   Age: 41 year old YOB: 1975     Date of Admission: 6/15/2017     Attending Provider: Aditi Jeffery*          Subjective:   Anion gap normalized.  Abdominal pain improved.  Feels like eating.         Objective:   /68  Pulse 117  Temp 97.7  F (36.5  C) (Oral)  Resp 13  Ht 1.549 m (5' 1\")  Wt 56.7 kg (125 lb)  LMP  (LMP Unknown)  SpO2 100%  BMI 23.62 kg/m2  GEN: Sitting comfortably, NAD.  HEENT: NC/AT.  EOMI.  Nares clear.  CV: RRR no m/R/G  RESP: CTAB  ABD: BS+, hypoactive.    SKIN: No rash  EXT: No ROSAURA  NEURO: Oriented x 3, appropriate.    Last Basic Metabolic Panel:  Lab Results   Component Value Date     06/17/2017      Lab Results   Component Value Date    POTASSIUM 3.9 06/17/2017     Lab Results   Component Value Date    CHLORIDE 120 06/17/2017     Lab Results   Component Value Date    JESSICA 7.4 06/17/2017     Lab Results   Component Value Date    CO2 17 06/17/2017     Lab Results   Component Value Date    BUN <1 06/17/2017     Lab Results   Component Value Date    CR 0.42 06/17/2017     Lab Results   Component Value Date     06/17/2017       CBC RESULTS:   Recent Labs   Lab Test  06/17/17   0300   WBC  4.1   RBC  3.30*   HGB  10.0*   HCT  28.2*   MCV  86   MCH  30.3   MCHC  35.5   RDW  15.4*   PLT  101*       Recent Labs  Lab 06/17/17  0853 06/17/17  0850 06/17/17  0755 06/17/17  0700 06/17/17  0557 06/17/17  0506 06/17/17  0358  06/17/17  0300  06/16/17  2002  06/16/17  1615  06/16/17  1315  06/16/17  1115   GLC  --  114*  --   --   --   --   --   --  106*  --  239*  --  149*  --  197*  --  187*   *  --  107* 129* 160* 144* 138*  < >  --   < >  --   < >  --   < >  --   < >  --    < > = values in this interval not displayed.  Hemoglobin A1C   Date Value Ref Range Status   06/15/2017 12.8 (H) 4.3 - 6.0 % Final       TSH   Date Value Ref Range Status   04/21/2014 1.61 0.4 - 5.0 mU/L Final "   ]  No results found for: T4]         Assessment and Plan:     Beverly Otero a 41 year old female with PMHx of TPAIT 6/2013 and DM 1 on insulin pump with complications of gastroparesis who presents for hyperglycemia and was found to have DKA.  The endocrinology service was consulted for Insulin management.    Her anion gap and bicarbonate has normalized with insulin drip and IVF via the DKA protocol.  She is alert and oriented and symptoms improved to where she is ready to advance her diet.      --Transition from Insulin GTT to Insulin via patient's home insulin pump   --Will need to work with pharmacy to sign inpatient insulin pump contract.  She does have necessary insulin pump supplies.     --Overlap Insulin GTT and Insulin pump x 2 hrs   --No settings changes to pump: 1unit/hr basal, 1unit/15 g CHO, and 1unit/glucose 100 > 150.   --Continue glucose checks q4h with nurse charting of insulin pump Bolus doses   --Obtain Basic metabolic panel 6/18    Micheal Eugene MD  Endocrinology Fellow  9:41 AM June 17, 2017         Endocrine Staff Note    The patient was seen and examined by me with Dr. Eugene. His note details our mutual findings and plan.  The most likely reason for recent poor glycemic control is loss of islet cell function. This makes it important to observe blood sugars in the hospital with her current insulin pump settings, and make adjustments as needed prior to discharge.   Given the recent history of weight loss and abdominal symptoms we also checked a morning cortisol, which was 5.4 (3am). The likelihood of adrenal insufficiency is low, and we do not feel this needs further workup. Abdominal symptoms may be related to her gastroparesis, although she did tell us that this pain feels different. Further workup per her medicine team.     Ronda Fan MD  Endocrinology and Diabetes   [phone]

## 2017-06-17 NOTE — PLAN OF CARE
Problem: Goal Outcome Summary  Goal: Goal Outcome Summary  Outcome: Improving  Shift durration: [id number]     LOC: alert, oriented x4.  Neuro: grossly intact, no noted deficits.  Cards: NSR, no ectopy noted on NOC shift.  Pulm: LS clear, on RA.  GI/: able to ambulate per self to bathroom, adequate UOP, no BM tonight.  Skin: intact, scars noted, PEG to abdomen.  Insulin needle (for home pump) to abdomen, gastric pump device noted under skin on lumbar area of back.  Mobility: independent/SBA  Vasc access: RUE PICC, PIV x2 (open)  Drains/Devices: none, home insulin pump off.  Special/Event:  Pt has 6B transfer orders.  K and Phos replaced overnight.  Labs changed to q4H from q2H.  Pt appears calm/comfortable overnight.

## 2017-06-17 NOTE — PROGRESS NOTES
[facility] Internal Medicine Progress Note          Assessment and Plan:   Beverly Elena is a 41y female with PMH significant for chronic pancreatitits s/p total pancreatectomy and auto islet transplant, poorly controlled diabetes, who presented with diabetic ketoacidosis.    Changes Today  - transitioning off IV insulin to SQ with home infusion pump  - d/c D5 infusion when IV insulin stopped  - start carbohydrate consistent diet    Problem List:    # DKA, resolved. Precipitating factor unclear. No obvious s/s of infection. Appreciate Endocrinology involvement in this case.   # s/p pancreatectomy and auto-islet transplant, now with poorly controlled diabetes managed with home insulin pump PTA (Hemoglobin A1c 12.8% on 6/15)  - Endocrinology following, appreciate assistance  - Start home insulin infusion pump with aspart, overlap with IV insulin infusion x 2 hours.  - stop D5 and IV insulin 2 hours after starting aspart home pump  - start carbohydrate consistent diet  - transfer to  once off DKA protocol  - will watch x 24 hours on home pump    # Abdominal pain  # N/V  Possible due to gastroenteritis vs DKA. If Gastroenteritis, this may have predisposed her to hyperglycemia and DKA. Pain continues to improve.  - continue to monitor    # Elevated LFTs  # History of PVT  DDx includes alcohol (occasional wine), although not in classed 1:2 ration (ALT:AST), vs viral. Abdominal ultrasound showed hepatic parenchymal disease, but no cirrhosis.   - will recheck CMP on 6/18  - patient to follow up with PCP regarding elevated LFTs and US findings of hepatic parenchymal disease. May need hepatology referral.     # Hx of chronic pancreatitis s/p pancreatectomy w/auto islets transplant  - continue home Creon supplements    # Gastroparesis w/pacer and PEG tube  - patient to follow up with GI as outpatient  - would prefer keeping tube for now in the event she becomes pregnant, would require increased nutrition    #  Tachypnea: resolved  Likely due to anion gap metabolic acidosis with respiratory compensation.    # Tachycardia: resolved  Likely due to dehydration given N/V and dehydration.    Patient seen and discussed with attending physician, Dr. Ang, who agrees with above plan.    Benedict Davenport MD, PhD  Medicine-Dermatology, PGY-1  [facility]          Subjective/Interval History:   NAEO. Patient reporting mild nausea and abdominal pain, but both are significantly improved since admission. Patient denies SOB, chest pain, dysuria, muscle pain.     4-point ROS negative except for above.              Physical Exam:   Temp: 97.7  F (36.5  C) Temp  Min: 97.7  F (36.5  C)  Max: 98.5  F (36.9  C)  Resp: 15 Resp  Min: 13  Max: 16  SpO2: 100 % SpO2  Min: 99 %  Max: 100 %    No Data Recorded  Heart Rate: 96 Heart Rate  Min: 85  Max: 105  BP: 105/68 Systolic (24hrs), Av , Min:98 , Max:188   Diastolic (24hrs), Av, Min:61, Max:122      Intake/Output Summary (Last 24 hours) at 17 1320  Last data filed at 17 0900   Gross per 24 hour   Intake           4799.7 ml   Output             5300 ml   Net           -500.3 ml     Gen: patient resting in bed in NAD  HEENT: non-icteric sclera, EOMI, mmm  Neck: supple  CV: RRR  Pulm: non-labored breathing on RA  Abd: soft, ND, mildly TTP in tana-umbilical area  Ext: no carlitos KAPLAN  Neuro: A&Ox3              Data:   Results from today reviewed in Epic.      Physician Attestation  I, Yolanda Ang MD, saw this patient with the resident and agree with the resident s findings and plan of care as documented in the resident s note with my edits.     I personally reviewed vital signs, medications, labs and imaging.    Yolanda Ang MD  Date of Service (when I saw the patient): 17

## 2017-06-18 VITALS
TEMPERATURE: 97 F | DIASTOLIC BLOOD PRESSURE: 52 MMHG | SYSTOLIC BLOOD PRESSURE: 93 MMHG | HEART RATE: 117 BPM | HEIGHT: 61 IN | RESPIRATION RATE: 16 BRPM | OXYGEN SATURATION: 98 % | WEIGHT: 125 LBS | BODY MASS INDEX: 23.6 KG/M2

## 2017-06-18 LAB
A-TOCOPHEROL VIT E SERPL-MCNC: 4.7
ALBUMIN SERPL-MCNC: 2.4 G/DL (ref 3.4–5)
ALP SERPL-CCNC: 102 U/L (ref 40–150)
ALT SERPL W P-5'-P-CCNC: 151 U/L (ref 0–50)
ANION GAP SERPL CALCULATED.3IONS-SCNC: 8 MMOL/L (ref 3–14)
ANION GAP SERPL CALCULATED.3IONS-SCNC: 8 MMOL/L (ref 3–14)
ANNOTATION COMMENT IMP: ABNORMAL
AST SERPL W P-5'-P-CCNC: 89 U/L (ref 0–45)
BETA+GAMMA TOCOPHEROL SERPL-MCNC: 0.9 MG/DL
BILIRUB SERPL-MCNC: 0.6 MG/DL (ref 0.2–1.3)
BUN SERPL-MCNC: 2 MG/DL (ref 7–30)
BUN SERPL-MCNC: 2 MG/DL (ref 7–30)
CALCIUM SERPL-MCNC: 8.2 MG/DL (ref 8.5–10.1)
CALCIUM SERPL-MCNC: 8.4 MG/DL (ref 8.5–10.1)
CHLORIDE SERPL-SCNC: 112 MMOL/L (ref 94–109)
CHLORIDE SERPL-SCNC: 112 MMOL/L (ref 94–109)
CO2 SERPL-SCNC: 20 MMOL/L (ref 20–32)
CO2 SERPL-SCNC: 22 MMOL/L (ref 20–32)
CREAT SERPL-MCNC: 0.44 MG/DL (ref 0.52–1.04)
CREAT SERPL-MCNC: 0.47 MG/DL (ref 0.52–1.04)
ERYTHROCYTE [DISTWIDTH] IN BLOOD BY AUTOMATED COUNT: 15.9 % (ref 10–15)
GFR SERPL CREATININE-BSD FRML MDRD: ABNORMAL ML/MIN/1.7M2
GFR SERPL CREATININE-BSD FRML MDRD: ABNORMAL ML/MIN/1.7M2
GLUCOSE BLDC GLUCOMTR-MCNC: 188 MG/DL (ref 70–99)
GLUCOSE BLDC GLUCOMTR-MCNC: 71 MG/DL (ref 70–99)
GLUCOSE SERPL-MCNC: 115 MG/DL (ref 70–99)
GLUCOSE SERPL-MCNC: 184 MG/DL (ref 70–99)
HCT VFR BLD AUTO: 29.6 % (ref 35–47)
HGB BLD-MCNC: 10.1 G/DL (ref 11.7–15.7)
MCH RBC QN AUTO: 30.1 PG (ref 26.5–33)
MCHC RBC AUTO-ENTMCNC: 34.1 G/DL (ref 31.5–36.5)
MCV RBC AUTO: 88 FL (ref 78–100)
PLATELET # BLD AUTO: 101 10E9/L (ref 150–450)
POTASSIUM SERPL-SCNC: 3.9 MMOL/L (ref 3.4–5.3)
POTASSIUM SERPL-SCNC: 4.3 MMOL/L (ref 3.4–5.3)
PROT SERPL-MCNC: 4.8 G/DL (ref 6.8–8.8)
RBC # BLD AUTO: 3.36 10E12/L (ref 3.8–5.2)
RETINYL PALMITATE SERPL-MCNC: ABNORMAL UG/ML
SODIUM SERPL-SCNC: 141 MMOL/L (ref 133–144)
SODIUM SERPL-SCNC: 142 MMOL/L (ref 133–144)
VIT A SERPL-MCNC: 0.08 UG/ML
WBC # BLD AUTO: 3.3 10E9/L (ref 4–11)

## 2017-06-18 PROCEDURE — 99238 HOSP IP/OBS DSCHRG MGMT 30/<: CPT | Mod: GC | Performed by: INTERNAL MEDICINE

## 2017-06-18 PROCEDURE — 80053 COMPREHEN METABOLIC PANEL: CPT | Performed by: STUDENT IN AN ORGANIZED HEALTH CARE EDUCATION/TRAINING PROGRAM

## 2017-06-18 PROCEDURE — 25000132 ZZH RX MED GY IP 250 OP 250 PS 637: Performed by: INTERNAL MEDICINE

## 2017-06-18 PROCEDURE — 85027 COMPLETE CBC AUTOMATED: CPT | Performed by: STUDENT IN AN ORGANIZED HEALTH CARE EDUCATION/TRAINING PROGRAM

## 2017-06-18 PROCEDURE — 25000125 ZZHC RX 250: Performed by: STUDENT IN AN ORGANIZED HEALTH CARE EDUCATION/TRAINING PROGRAM

## 2017-06-18 PROCEDURE — 00000146 ZZHCL STATISTIC GLUCOSE BY METER IP

## 2017-06-18 PROCEDURE — 36592 COLLECT BLOOD FROM PICC: CPT | Performed by: STUDENT IN AN ORGANIZED HEALTH CARE EDUCATION/TRAINING PROGRAM

## 2017-06-18 PROCEDURE — 25000132 ZZH RX MED GY IP 250 OP 250 PS 637: Performed by: STUDENT IN AN ORGANIZED HEALTH CARE EDUCATION/TRAINING PROGRAM

## 2017-06-18 RX ADMIN — VITAMIN D, TAB 1000IU (100/BT) 8000 UNITS: 25 TAB at 08:09

## 2017-06-18 RX ADMIN — PANTOPRAZOLE SODIUM 40 MG: 40 TABLET, DELAYED RELEASE ORAL at 08:09

## 2017-06-18 RX ADMIN — PANCRELIPASE 72000 UNITS: 24000; 76000; 120000 CAPSULE, DELAYED RELEASE PELLETS ORAL at 08:09

## 2017-06-18 RX ADMIN — SODIUM CHLORIDE, PRESERVATIVE FREE 5 ML: 5 INJECTION INTRAVENOUS at 07:32

## 2017-06-18 NOTE — PLAN OF CARE
Problem: Goal Outcome Summary  Goal: Goal Outcome Summary  Outcome: No Change  Pt A&Ox4 VSS on room air, independent in room. BG overnight 71. Possible DC today pending labs and continuing stable BGs. Denies further pain/nausea. Continue with POC.

## 2017-06-18 NOTE — DISCHARGE SUMMARY
[facility] Discharge Summary  Department of Medicine    Beverly Elena MRN# [id number]   YOB: 1975 Age: 41 year old     Date of Admission:  6/15/2017  Date of Discharge:  6/18/2017   Admitting Physician:  Aditi Jeffery MD  Discharge Physician:  Yolanda Ang MD  Discharging Service:  Internal Medicine, [place] [address]     Primary Provider: Marleny Hathaway         Reason for Admission:   Please see H&P from 6/15/2017 for full HPI.     Mrs. 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 She had been having recent abdominal pain and nausea and had been having a difficult time controlling her blood sugars at home with very high readings and occasionally very low readings. She was found to have an anion dot metabolic acidosis with blood glucose of 312, pH of 7.03. She was admitted to the MICU for further evaluation and management.             Discharge Diagnosis:   Active Problems:    DKA (diabetic ketoacidoses) (H) (6/15/2017)  Elevated LFTs           Procedures & Significant Findings:     Results for orders placed or performed during the hospital encounter of 06/15/17   XR Chest Port 1 View    Narrative    EXAMINATION: XR CHEST PORT 1 VW  6/15/2017 10:57 AM      CLINICAL HISTORY: Worsening dyspnea     COMPARISON: 7/19/2013        FINDINGS:  Single frontal view of the chest. Neurostimulator leads projecting  over high/mid thoracic spine. Removal of right IJ central venous  catheter. Surgical clips in partially visualized upper abdomen.    Cardiac silhouette within normal limits. Bronchovascular markings are  unremarkable. No focal airspace opacity. No pleural effusion or  pneumothorax. Partially visualized upper abdomen is unremarkable.        Impression     IMPRESSION:  No acute airspace disease.    I have personally reviewed the examination and initial interpretation  and I agree with the findings.    CUATE KELLER MD   XR Abdomen Port 1 View    Narrative    XR ABDOMEN PORT F1 VW 6/15/2017 4:04 PM    History: Abdominal pain    Comparison: Fluoroscopy images from 2/19/2016    Findings: 2 supine views of the abdomen. Gastric pacemaker and spinal  cord stimulator devices project over the mid abdomen. G-tube over the  stomach. Suture lines in the upper abdomen and mid abdomen.  Nonobstructive bowel gas pattern. Moderate fecal load.      Impression    Impression:  Nonobstructive bowel gas pattern. Moderate fecal load.    I have personally reviewed the examination and initial interpretation  and I agree with the findings.    RAJEEV GRIFFIN   US Abdomen Complete w Doppler Complete    Narrative    EXAMINATION: US ABDOMEN COMPLETE WITH DOPPLER, 6/15/2017 5:30 PM     COMPARISON: 8/19/2013    HISTORY: patient with history of portal venous thrombosis, presents  with transaminitis    TECHNIQUE: The abdomen was scanned in standard fashion with  specialized ultrasound transducer(s) using both gray-scale, color  Doppler, and spectral flow techniques.    Findings:    Liver: The liver demonstrates heterogeneous, somewhat echogenic, and  nodular appearance of the parenchyma. It is hyperechoic with respect  to the right renal cortex. No evidence of a focal hepatic mass. The  liver measures 13.6 cm in the longitudinal dimension.    Extrahepatic portal vein flow is antegrade, measuring 36 cm/sec.  Right portal vein flow is antegrade, measuring 22 cm/sec.  Left portal vein flow is antegrade, measuring 14 cm/sec.      Flow in the hepatic artery is towards the liver and:  161 cm/sec peak systolic  0.89 resistive index.     The splenic vein is patent and flow is towards the liver.  The left,  middle, and right hepatic veins are patent with flow towards the IVC.  The IVC is patent with flow  towards the heart.   The visualized aorta  is not dilated.    Gallbladder: Surgically absent.    Bile Ducts: Both the intra- and extrahepatic biliary system are of  normal caliber.  The common bile duct measures 3 mm in diameter.    Pancreas: Not well visualized due to overlying bowel gas.    Right kidney: Visualized parenchyma is normal.    Left kidney: Not visualized.    Spleen: Not visualized.    Fluid: No evidence of ascites or pleural effusions.        Impression    Impression:   1. Nodular, heterogeneous, and hyperechoic liver, suggestive of  hepatic parenchymal disease. No definitive findings to nodularity  clearly indicating cirrhosis.  2. Normal Doppler evaluation of the visualized vasculature.  3. Due to overlying bowel gas, the pancreas, left kidney, and spleen  are not visualized. Although poorly visualized, the right kidney  appears normal.    I have personally reviewed the examination and initial interpretation  and I agree with the findings.    SULMA REA MD   XR Chest Port 1 View    Narrative    EXAM: XR CHEST PORT 1 VW  6/16/2017 10:37 AM     HISTORY:  RN placed PICC - verify tip placement       COMPARISON: 6/15/2017    FINDINGS: Unchanged gastric neurostimulator. Few upper abdomen  surgical clips. Right PICC line tip over distal SVC. Cardiomediastinal  silhouette is within normal limits. No pneumothorax or pleural  effusion. No airspace opacities.      Impression    IMPRESSION:   1. Right PICC line tip over distal SVC.  2. Clear lungs.    I have personally reviewed the examination and initial interpretation  and I agree with the findings.    CUATE KELLER MD            Consultations:   Pancreas Transplant Adult IP  Endocrinology           Hospital Course by Problem:      # DKA  # s/p pancreatectomy and auto-islet transplant, now poorly controlled diabetes managed with home insulin pump  Patient presented with anion gap acidosis with blood glucose of 312, ketosis, ketonuria. HgbA1c was  "12.8%. Patient was admitted to the MICU and started on the hospital DKA protocol which consists of insulin infusion, maintenance IV fluids, and frequent monitoring of labs. Patient was in the MICU overnight until her anion gap closed and she was able to be transitioned from an insulin infusion to her subcutaneous home insulin pump. Patient was monitored for a day while on her home insulin pump and was then discharged. Endocrinology was consulted and assisted. It is unclear what the precipitating factor or factors were for her DKA.     # Elevated LFTs:  Patient was found to have elevated LFTs that persisted through her short hospital stay. An abdominal ultrasound showed hepatic parenchyma disease without evidence of cirrhosis. Patient should follow up with her PCP within two weeks for repeat of her LFTs. If they continue to be elevated, she may need a referral to a hepatologist.     Physical Exam on day of Discharge:  Blood pressure 93/52, pulse 117, temperature 97  F (36.1  C), temperature source Oral, resp. rate 16, height 1.549 m (5' 1\"), weight 56.7 kg (125 lb), SpO2 98 %.  General: patient resting in bed in NAD  HEENT: non-icteric sclera, EOMI, clear OP, mmm  Neck: supple  CV: RRR, nl S1/S2  Pulm: CTAB, non-labored breathing on RA  Abdomen/GI: soft, ND, mildly tender to palpation  Extremities: no ROSAURA, wwp  Skin: no jaundice  Neuro: A&Ox3, no focal deficits  Psych: normal affect           Pending Results:   None         Discharge Medications:     Current Discharge Medication List      CONTINUE these medications which have NOT CHANGED    Details   amitriptyline (ELAVIL) 100 MG tablet Take 1 tablet (100 mg) by mouth At Bedtime  Qty: 30 tablet, Refills: 11    Associated Diagnoses: Chronic pancreatitis (H); Acute post-operative pain; Chronic abdominal pain; Encounter for long-term opiate analgesic use      esomeprazole (NEXIUM) 40 MG capsule Take 1 capsule (40 mg) by mouth 2 times daily Take 30-60 minutes before " eating.  Qty: 90 capsule, Refills: 3    Associated Diagnoses: Diarrhea      amylase-lipase-protease (CREON) 01201 UNITS CPEP 3 with meals & 2 with snacks  Qty: 450 capsule, Refills: 0    Associated Diagnoses: Pancreatic insufficiency      Insulin Aspart (INSULIN PUMP - OUTPATIENT) 1 unit/hour      Probiotic Product (PROBIOTIC DAILY PO) Take 2 tablets by mouth daily      Cholecalciferol (VITAMIN D3 PO) Take 8,000 Units by mouth daily       ondansetron (ZOFRAN-ODT) 8 MG disintegrating tablet Every 4-6 hours as needed      metoclopramide (REGLAN) 10 MG tablet Take 1 tablet by mouth every 6 hours as needed      Multiple Vitamins-Minerals (ADEKS) chewable tablet Take 1 tablet by mouth 2 times daily  Qty: 60 tablet, Refills: 3    Associated Diagnoses: Pancreatic insufficiency      ACCU-CHEK FASTCLIX LANCETS MISC 1 each every 2 hours as needed.  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  (Accu-chek Smartview test strips)  Qty: 3 Month, Refills: 3    Associated Diagnoses: 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)      Alcohol Swabs 70 % PADS Use to check blood glucose 6-8 times daily  Qty: 1 each, Refills: 3    Associated Diagnoses: Chronic pancreatitis (H)      BD SHARPS CONTAINER HOME MISC Use to dispose of used sharps  Qty: 1 each, Refills: 0    Associated Diagnoses: 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)      glucose 40 % GEL Take 15 g by mouth every hour as needed.  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)      rizatriptan (MAXALT) 10 MG tablet Take 10 mg by mouth at onset of headache     Associated Diagnoses: Sphincter of Oddi dysfunction; Pancreatic divisum; Recurrent acute pancreatitis                  Discharge Instructions and Follow-Up:      Discharge Procedure Orders  Reason for your hospital stay   Order Comments: Hyperglycemia, diabetic ketoacidosis, nausea, vomiting, abdominal pain.     Adult [facility] Follow-up and recommended labs and tests   Order Comments: You should follow up with your primary care provider within two weeks in order to follow up on your liver function tests.     Appointments on [place] and/or [facility] (with [facility] or [facility] provider or service). Call [phone] if you haven't heard regarding these appointments within 7 days of discharge.     Activity   Order Comments: Your activity upon discharge: activity as tolerated   Order Specific Question Answer Comments   Is discharge order? Yes      Follow Up and recommended labs and tests   Order Comments: Follow up with primary care provider, Marleny Hathaway, within 7 days for hospital follow- up.  The following labs/tests are recommended: CMP.     Full Code     Diet   Order Comments: Follow this diet upon discharge: Orders Placed This Encounter     Consistent Carbohydrate Diet 0872-3957 Calories: Moderate Consistent CHO (4-6 CHO units/meal)   Order Specific Question Answer Comments   Is discharge order? Yes                  Discharge Disposition:   Home         Condition on Discharge:   Discharge condition: Good   Code status on discharge: Full Code        Date of service: 6/18/2017    The patient was discussed with Dr. Ang.    Benedict Davenport MD, PhD  Medicine-Dermatology PGY-1  [facility] 3

## 2017-06-18 NOTE — PLAN OF CARE
"Problem: Goal Outcome Summary  Goal: Goal Outcome Summary  Outcome: No Change     /68 (BP Location: Left arm)  Pulse 117  Temp 97.1  F (36.2  C) (Oral)  Resp 18  Ht 1.549 m (5' 1\")  Wt 56.7 kg (125 lb)  LMP  (LMP Unknown)  SpO2 100%  BMI 23.62 kg/m2     Pt transferred from  at 1910. A&Ox4, VSS on RA, up ad nory. Reported slight nausea, given PRN IV Zofran once with relief; denies pain. Pt refused tele, reported that MICU had had her off all day. Also wanting to d/c, MD spoke to pt and pt and  agreeable to stay overnight if teled/c'ed and Trazodone ordered. BG stable at HS check at 117, ambulatory pump continues with home settings. Blil PEG clamped and CDI. 2 PIVs and PICC are SL. Continue to monitor and with POC.       "

## 2017-06-18 NOTE — PROGRESS NOTES
VSS, denies pain,  before breakfast, pt gave Insulin coverage form ambulatory Insulin pump. Pt reviewed discharge instructions, expressed understanding of the discharge plan. Left ~1150 ambulatory.

## 2017-06-18 NOTE — PLAN OF CARE
Problem: Goal Outcome Summary  Goal: Goal Outcome Summary  Outcome: Improving  D: Remains hospitalized for DKA.  IA: Glucose levels have stabilized and acidosis resolved. Transitioned back to pt's own insulin pump with self-administered boluses. Form prepared and signed for RN assessment of pt's ability to self-administer. Pt. Self-administered her insulin with RN present and in agreement. Up ad nory in room. States abdominal discomfort has improved.   P: Transferring to 5A. Report has been called.

## 2017-06-18 NOTE — PROGRESS NOTES
"Endocrinology Progress Note  Beverly Elena MRN# [id number]   Age: 41 year old YOB: 1975     Date of Admission: 6/15/2017     Attending Provider: Aditi Jeffery*          Subjective:   Sitting up in bed dressed and with suitcase to leave with .      ROS:   Feeling well.   Not eating much.   Pertinent ROS negative.          Objective:   BP 93/52 (BP Location: Left arm)  Pulse 117  Temp 97  F (36.1  C) (Oral)  Resp 16  Ht 1.549 m (5' 1\")  Wt 56.7 kg (125 lb)  LMP  (LMP Unknown)  SpO2 98%  BMI 23.62 kg/m2  GEN: Sitting comfortably, NAD.  HEENT: NC/AT.  EOMI.  Nares clear.  CV: RRR no m/R/G  RESP: CTAB  ABD: BS+, hypoactive.    SKIN: No rash  EXT: No ROSAURA  NEURO: Oriented x 3, appropriate.    Last Basic Metabolic Panel:  Lab Results   Component Value Date     06/17/2017      Lab Results   Component Value Date    POTASSIUM 3.9 06/17/2017     Lab Results   Component Value Date    CHLORIDE 120 06/17/2017     Lab Results   Component Value Date    JESSICA 7.4 06/17/2017     Lab Results   Component Value Date    CO2 17 06/17/2017     Lab Results   Component Value Date    BUN <1 06/17/2017     Lab Results   Component Value Date    CR 0.42 06/17/2017     Lab Results   Component Value Date     06/17/2017       CBC RESULTS:   Recent Labs   Lab Test  06/17/17   0300   WBC  4.1   RBC  3.30*   HGB  10.0*   HCT  28.2*   MCV  86   MCH  30.3   MCHC  35.5   RDW  15.4*   PLT  101*       Recent Labs  Lab 06/18/17  0802 06/18/17  0734 06/18/17  0200 06/17/17  2201 06/17/17  2134 06/17/17  1720 06/17/17  1411 06/17/17  1316 06/17/17  1310  06/17/17  0850  06/17/17  0300  06/16/17 2002   GLC  --  184*  --  115*  --   --   --   --  187*  --  114*  --  106*  --  239*   *  --  71  --  117* 76 171* 165*  --   < >  --   < >  --   < >  --    < > = values in this interval not displayed.  Hemoglobin A1C   Date Value Ref Range Status   06/15/2017 12.8 (H) 4.3 - 6.0 % Final       TSH "   Date Value Ref Range Status   04/21/2014 1.61 0.4 - 5.0 mU/L Final   ]  No results found for: T4]         Assessment and Plan:     Beverly Otero a 41 year old female with PMHx of TPAIT 6/2013 and DM 1 on insulin pump with complications of gastroparesis who presents for hyperglycemia and was found to have DKA.  The endocrinology service was consulted for Insulin management.  Given the recent history of weight loss and abdominal symptoms we also checked a morning cortisol, which was 5.4 (3am). The likelihood of adrenal insufficiency is low, and we do not feel this needs further workup. Abdominal symptoms may be related to her gastroparesis, although she did tell us that this pain feels different. Further workup per her medicine team.     --Ok to D/C from endocrine perspective  --Follow up with outside endocrinologist in [place] and Dr. Jones for ongoing management DM  --Recommend using her pump bolus wizard and checking blood sugars frequently (either using FS or CGM) to prepare for her follow up appointment     Micheal Eugene MD  Endocrinology Fellow      Endocrine Staff Note    The patient was seen and examined by me with Dr. Eugene. His note details our mutual findings and plan.    Ronda Fan MD  Endocrinology and Diabetes   [phone]

## 2017-06-19 ENCOUNTER — TELEPHONE (OUTPATIENT)
Dept: ENDOCRINOLOGY | Facility: CLINIC | Age: 42
End: 2017-06-19

## 2017-06-19 ENCOUNTER — CARE COORDINATION (OUTPATIENT)
Dept: CARE COORDINATION | Facility: CLINIC | Age: 42
End: 2017-06-19

## 2017-06-19 LAB — INTERPRETATION ECG - MUSE: NORMAL

## 2017-06-19 NOTE — TELEPHONE ENCOUNTER
Beverly was seen by Ronda Fan  inpatient for DKA . She is to f/u  with :  --Follow up with outside endocrinologist in [place] and Dr. Jones for ongoing management DM  --Recommend using her pump bolus wizard and checking blood sugars frequently (either using FS or CGM) to prepare for her follow up appointment     I did call Beverly to make sure  she was following up with these providers and she gave confirmation that she is  following up with Endocrinologist in [place] and Dr Jones . No f/u in adult  endocrinology is needed.

## 2017-06-19 NOTE — PROGRESS NOTES
"[facility]  \"Hello, my name is Jessica Hernandez , and I am calling from the [facility].  I want to check in and see how you are doing, after leaving the hospital.  You may also receive a call from your Care Coordinator (care team), but I want to make sure you don t have any urgent needs.  I have a couple questions to review with you:     Post-Discharge Outreach                                                    Beverly Elena is a 41 year old female     Follow-up Appointments           Adult [facility] Follow-up and recommended labs and tests         You should follow up with your primary care provider within two weeks in order to follow up on your liver function tests.                    Care Team:    Patient Care Team       Relationship Specialty Notifications Start End    Marleny Hathaway PCP - General   10/27/11     Comment:  Phone: [phone], Fax: [phone], [facility], [address] [address]    Phone: [phone] Fax: [phone]         [facility] [address] [facility] [address]    Caty Gerard, RN Registered Nurse Transplant All results, Admissions 2/19/16     Pager: [phone]          [facility] [address]            Transition of Care Review                                                      Did you have a surgery or procedure during your hospital visit? No   If yes, do you have any of the following:     Signs of infection:  NO    Pain:  Yes     Pain Scale (0-10) 2/10     Location: Headache    Wound/incision concerns? NA    Do you have all of your medications/refills?  Yes    Are you having any side effects or questions about your medication(s)? No    Do you have any new or worsening symptoms?  Yes- Vision blurred, headache, Nausea when eating    Do you have any future appointments scheduled?   Her PCP next wednesday             Plan                                                      Thanks for your time.  " Your Care Coordinator may follow-up within the next couple days.  In the meantime if you have questions, concerns or problems call your care team.        Jessica Hernandez

## 2017-06-22 LAB
BACTERIA SPEC CULT: NO GROWTH
MICRO REPORT STATUS: NORMAL
SPECIMEN SOURCE: NORMAL

## 2017-06-26 ENCOUNTER — TELEPHONE (OUTPATIENT)
Dept: TRANSPLANT | Facility: CLINIC | Age: 42
End: 2017-06-26

## 2017-06-26 DIAGNOSIS — E46 MALNUTRITION (H): Primary | ICD-10-CM

## 2017-06-26 NOTE — TELEPHONE ENCOUNTER
Beverly ,    Looks like we can get you in to see Dr Power, Thursday July 7th any time from 8 am until 9:20~ let me know what time you prefer.    I will mail you some samples of W vitamins and suggest you start with 2 a day.  I will ask Dr Power to address your vitamin D levels next week.    I have attached the information on the Creon free vitamin program--male sure you order enough for twice a day.    Best wishes,  Margarita

## 2017-07-06 ENCOUNTER — OFFICE VISIT (OUTPATIENT)
Dept: TRANSPLANT | Facility: CLINIC | Age: 42
End: 2017-07-06
Attending: PEDIATRICS
Payer: COMMERCIAL

## 2017-07-06 VITALS
BODY MASS INDEX: 23.73 KG/M2 | TEMPERATURE: 98.2 F | DIASTOLIC BLOOD PRESSURE: 74 MMHG | SYSTOLIC BLOOD PRESSURE: 113 MMHG | WEIGHT: 125.66 LBS | HEART RATE: 105 BPM | RESPIRATION RATE: 16 BRPM | HEIGHT: 61 IN | OXYGEN SATURATION: 98 %

## 2017-07-06 DIAGNOSIS — K86.81 EXOCRINE PANCREATIC INSUFFICIENCY: Primary | ICD-10-CM

## 2017-07-06 LAB — HBA1C MFR BLD: 13.1 % (ref 0–5.7)

## 2017-07-06 PROCEDURE — 83036 HEMOGLOBIN GLYCOSYLATED A1C: CPT | Mod: ZF | Performed by: PEDIATRICS

## 2017-07-06 NOTE — PATIENT INSTRUCTIONS
1)  Current pump settings:  Basal 1.0 unit/hour  Bolus:  1 unit per 30g,  , target  mg/dL  Daily totals 24- 33 units/day    Let's change these to  Basal 0.8 units per hour  Bolus set I:C ratio at 25g    2)  You are adding a MVW.  Let's also change your vitamin D to a liquid form, and add an OTC vitamin A daily.    3)  For your absorption issues, let's do a trial of mixing Viokace with Creon-- 2 Viokace + 2 Creon instead of 4 Creon.      Follow Up October 5 at 9:20am

## 2017-07-06 NOTE — LETTER
7/6/2017       RE: Beverly Elena  [address] [address]     Dear Colleague,    Thank you for referring your patient, Beverly Elena, to the [facility] at [facility]. Please see a copy of my visit note below.    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  At the time of the procedure, the patient received 84,200 IEQ, or 1,551 IEQ/kg body weight.  Transplant course was complicated by portal vein thrombosis requiring intervention for thrombolysis.  Her main issue since post op has been GI:  Severe gastroparesis and uncontrolled malabsorption that has been refractory to all changes in enzymes and other medications (imodium, tincture of opium).  She does have a gastric pacemaker placed by Dr. Efrem Huber.    At today's visit, Beverly returns because of major struggles with diabetes control.   We reviewed her logs together and she just has many inconsistencies in her response to insulin-- some days meal doses will keep her normal or drive her low, but more often despite meal dosing she is ending up at 200-500 mg/dL post meal.  She had her first episode of DKA 2 weeks ago (admitted here).  She remains on a medtronic insulin pump.  She often uses the dual wave bolus feature because  of her gatroparesis.  She has diarrhea every day, about 20 times per day.  She is currently using the Creon 24 at dose of 4 with meals (2 early, 2 at end) and has tried multiple enzymes in past with no success.  She does not have severe hypoglycemia and has been able to treat her lows.  She won't give a correction bolus at bedtime    Diabetes history:  Current insulin regimen:  Basal 1.0 unit/hour  Bolus:  1 unit per 30g,  , target  mg/dL  Daily totals 24- 33 units/day :  This was taken off pump so did not have download, but most days is around 29 unit/day on avg    Recent hemoglobin A1c levels:  Lab Results   Component Value Date    A1C 13.1 07/06/2017    A1C 12.8 06/15/2017    A1C 5.9 08/05/2015    A1C 5.4 01/12/2015    A1C 5.6 06/26/2014       Hypoglycemia history:  Yes, mild to moderate.  The patient has had none episodes of severe hypoglycemia (seizure, coma, or neuroglycopenic symptoms severe enough to require assistance from another person).  Blood sugars were reviewed from the patient records and/or the meter download.      Nutrition:  All oral feeds, no TF  Enzymes:  Creon 24, 4 with meals  Vitamins:  Was standard MVI and is changing to MVW.  Takes gummy vitamin D at dose of 6000 unit per day      Review of systems:  Negative except as above. 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) 6/28/2013     Endometriosis      Endometriosis 9/9/2011     Exocrine pancreatic insufficiency 6/28/2013     Gastro-oesophageal reflux disease      Gastroparesis      Gastroparesis 7/23/2012     Iron deficiency anemia 7/12/2013     Other chronic pain      Pancreatic  disease     history of pancreatitis     Pancreatic divisum      Pancreatic divisum 10/1/2012     Pneumonia, organism 7/17/2013     PONV (postoperative nausea and vomiting)     Nausea     Portal vein thrombosis 6/29/2013     Recurrent acute pancreatitis 1/17/2013     Sphincter of Oddi dysfunction 1/17/2013     Syncope     X 2     Type 2 diabetes mellitus without complications (H)      Past Surgical History:   Procedure Laterality Date     APPENDECTOMY       CHOLECYSTECTOMY       COLONOSCOPY  5/21/2014    Procedure: COMBINED COLONOSCOPY, SINGLE BIOPSY/POLYPECTOMY BY BIOPSY;  Surgeon: Hugo Lind MD;  [facility]: [facility]     ENDOSCOPIC INSERTION TUBE GASTROSTOMY  2/5/2014    Procedure: ENDOSCOPIC INSERTION TUBE GASTROSTOMY;  Endoscopic percutaneous gastro-jejunostomy tube placement (removal of previous gastrostomy tube)  ;  Surgeon: Sharon Oh MD;  [facility]     ENDOSCOPIC RETROGRADE CHOLANGIOPANCREATOGRAM  9/9/2011    Procedure:ENDOSCOPIC RETROGRADE CHOLANGIOPANCREATOGRAM; Endoscopic Retrograde Cholangiopancreatogram with C Arm, Biliary Sphincterotomy, insertion Bile Duct Stent X 1; Surgeon:JABIER HEATH; [facility]     ENDOSCOPIC RETROGRADE CHOLANGIOPANCREATOGRAM  12/15/2011    Procedure:ENDOSCOPIC RETROGRADE CHOLANGIOPANCREATOGRAM; Endoscopic Retrograde Cholangiopancreatogram (c-arm), with pancreatic stent placement, sphincterotomy; Surgeon:JABIER HEATH; [facility]     ENDOSCOPIC RETROGRADE CHOLANGIOPANCREATOGRAM  6/14/2012    Procedure: ENDOSCOPIC RETROGRADE CHOLANGIOPANCREATOGRAM;  endoscopic retrograde cholangiopancreatogram with pancreatic stent placement  (c-arm);  Surgeon: Jabier Heath MD;  Location:  OR     ESOPHAGOSCOPY, GASTROSCOPY, DUODENOSCOPY (EGD), COMBINED  11/9/2011    Procedure:COMBINED ESOPHAGOSCOPY, GASTROSCOPY, DUODENOSCOPY (EGD); Surgeon:TANIKA MOHAN; [facility]     ESOPHAGOSCOPY, GASTROSCOPY, DUODENOSCOPY (EGD), COMBINED  5/21/2014     Procedure: COMBINED ESOPHAGOSCOPY, GASTROSCOPY, DUODENOSCOPY (EGD), BIOPSY SINGLE OR MULTIPLE;  Surgeon: Hugo Lind MD;  [facility]     HC CHANGE GASTROSTOMY TUBE PERC, WO IMAGING OR ENDO GUIDE N/A 11/25/2014    Procedure: CHANGE GASTROSTOMY TUBE WITHOUT SCOPE;  Surgeon: Sharon Oh MD;  [facility]: [facility]     HC IMPLANT PERIPH/GASTRIC NEUROSTIM/  1/11/2012    gastric pacemaker     HC REPLACE DUODENOSTOMY/JEJUNOSTOMY TUBE PERCUTANEOUS  2/12/2014    Procedure: REPLACE JEJUNOSTOMY TUBE, PERCUTANEOUS;  Surgeon: Sharon Oh MD;  [facility]     HC UGI ENDOSCOPY W EUS  9/7/2011    Procedure:COMBINED ENDOSCOPIC ULTRASOUND, ESOPHAGOSCOPY, GASTROSCOPY, DUODENOSCOPY (EGD); Surgeon:TREE DEAN; [facility]     HC UGI ENDOSCOPY W EUS  12/4/2012    Procedure: COMBINED ENDOSCOPIC ULTRASOUND, ESOPHAGOSCOPY, GASTROSCOPY, DUODENOSCOPY (EGD);  Surgeon: Sharon Oh MD;  [facility]     HC UGI ENDOSCOPY W EUS  5/8/2013    Procedure: COMBINED ENDOSCOPIC ULTRASOUND, ESOPHAGOSCOPY, GASTROSCOPY, DUODENOSCOPY (EGD);  Surgeon: Tree Dean MD;  [facility]     HC UGI ENDOSCOPY W PLACEMENT GASTROSTOMY TUBE PERCUT N/A 2/19/2016    Procedure: COMBINED ESOPHAGOSCOPY, GASTROSCOPY, DUODENOSCOPY (EGD), PLACE PERCUTANEOUS ENDOSCOPIC GASTROSTOMY TUBE;  Surgeon: Sharon Oh MD;  [facility]     LAPAROSCOPIC IMPLANT GASTRIC STIMULATOR  1/11/2012    Procedure:LAPAROSCOPIC IMPLANT GASTRIC STIMULATOR; LAPAROSCOPIC IMPLANT GASTRIC STIMULATOR ; Surgeon:CHONG HART; [facility]     LAPAROSCOPIC SALPINGO-OOPHORECTOMY       LAPAROSCOPIC SALPINGOTOMY      R     LAPAROTOMY EXPLORATORY  6/29/2013    Procedure: LAPAROTOMY EXPLORATORY;  Exploratory laparotomy, portal vein declotting with tissue plasminogen activator administration, thrombectomy and embolectomy of portal vein, intraoperative trans-sonic flow monitoring, intraoperative ultrasound;  Surgeon: Rajendra Cruz MD;   Location[facility]     neurostimulator[  2011     PANCREATECTOMY, TRANSPLANT AUTO ISLET CELL, COMBINED  2013    Procedure: COMBINED PANCREATECTOMY, TRANSPLANT AUTO ISLET CELL;  Total Pancreatectomy, Auto Islet Cell Transplant             Anesthesia General with block;  Surgeon: Rajendra Cruz MD;  [facility]     PICC INSERTION Right 2017    5fr TL Bard PICC, 37cm (3cm external) in the R basilic vein w/ tip in the low SVC     TUBAL LIGATION         Family History:  Family History   Problem Relation Age of Onset     C.A.D. Father      GI: V, D, anorexia     Blood Disease Father      lymphoma -  from pulmonary fibrosis.     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      Physical Exam:  General:  Well-appearing, NAD  Psych:  Communicative, with normal affect         Assessment:  1.  Post-pancreatectomy diabetes mellitus, s/p total pancreatectomy and islet autotransplant.    Beverly is a 41 year old with history of chronic pancreatitis who is s/p total pancreatectomy and islet autotransplant.  She has extremely poor diabetes control.  She is underbolues on carbs, but also appears to be on too much basal as her numbers really fall many nights without a correction dose.  The dose increase in the carbs today is pretty modest, likely is going to need more.    We also discussed insulin pump options because we could look at a 670g pump.  In theory that is a really nice option for her because of the basal  adjustment and suspend for impending hypoglycemia.  However, the challenge is that she would need to be much more aggressive with bolusing because persistent highs will 'kick her out' of the closed loop mode.    We addressed vitamin deficiencies as part of this visit as noted below.       She has had intractable diabetes despite multiple attempts in the past at adjusting PERT regimen.  However, since she has not tried any adjustments in some time, we discussed a trial of combination viokace/creon (which has not been done previously) though with her gastroparesis if the ready release of viokase would be helpful.  We also discussed possibility of another round of abx for SIBO which may have been done in the distant past but not in several years.    Plan:  1.  Changes to current diabetes regimen:  Patient Instructions   1)  Current pump settings:  Basal 1.0 unit/hour  Bolus:  1 unit per 30g,  , target  mg/dL  Daily totals 24- 33 units/day    Let's change these to  Basal 0.8 units per hour  Bolus set I:C ratio at 25g    2)  You are adding a MVW.  Let's also change your vitamin D to a liquid form, and add an OTC vitamin A daily.    3)  For your absorption issues, let's do a trial of mixing Viokace with Creon-- 2 Viokace + 2 Creon instead of 4 Creon.      Follow Up October 5 at 9:20am          Contact me for questions at [phone] or [phone].  Emergency number to reach pediatric endocrinology after hours is [phone].        Trinity Power MD  , Pediatric Endocrinology and Diabetes  [facility] [place]  [facility]      I spent a total of 40 minutes with this complex patient with life threatening diabetes (recent DKA), malnutrition and GI issues with >50% spent on counseling on plan of care      Again, thank you for allowing me to participate in the care of your patient.      Sincerely,    Trinity Power MD

## 2017-07-06 NOTE — MR AVS SNAPSHOT
After Visit Summary   7/6/2017    Beverly Elena    MRN: [id number]           Patient Information     Date Of Birth          1975        Visit Information        Provider Department      7/6/2017 9:20 AM Trinity Power MD [facility] Trans[facility]        Today's Diagnoses     Exocrine pancreatic insufficiency    -  1      Care Instructions    1)  Current pump settings:  Basal 1.0 unit/hour  Bolus:  1 unit per 30g,  , target  mg/dL  Daily totals 24- 33 units/day    Let's change these to  Basal 0.8 units per hour  Bolus set I:C ratio at 25g    2)  You are adding a MVW.  Let's also change your vitamin D to a liquid form, and add an OTC vitamin A daily.    3)  For your absorption issues, let's do a trial of mixing Viokace with Creon-- 2 Viokace + 2 Creon instead of 4 Creon.      Follow Up October 5 at 9:20am          Follow-ups after your visit        Follow-up notes from your care team     Return in about 3 months (around 10/6/2017).      Your next 10 appointments already scheduled     Jul 07, 2017 11:30 AM CDT   (Arrive by 11:15 AM)   Office Visit with Lynn Tanner RN   [facility])    [address] [place], [place] [id number] [phone]           Bring a current list of meds and any records pertaining to this visit.  For Physicals, please bring immunization records and any forms needing to be filled out.  Please arrive 10 minutes early to complete paperwork.            Jul 07, 2017  1:30 PM CDT   (Arrive by 1:15 PM)   Office Visit with Sherri Leo RD   [facility])    [address] [place] [id number] [phone]           Bring a current list of meds and any records pertaining to this visit.  For Physicals, please bring immunization records and any forms needing to be filled out.  Please arrive 10 minutes early to complete  "paperwork.              Who to contact     If you have questions or need follow up information about today's clinic visit or your schedule please contact [facility] TRANSPL[facility] directly at [phone].  Normal or non-critical lab and imaging results will be communicated to you by Biotherahart, letter or phone within 4 business days after the clinic has received the results. If you do not hear from us within 7 days, please contact the clinic through Biotherahart or phone. If you have a critical or abnormal lab result, we will notify you by phone as soon as possible.  Submit refill requests through Circular or call your pharmacy and they will forward the refill request to us. Please allow 3 business days for your refill to be completed.          Additional Information About Your Visit        Circular Information     Circular gives you secure access to your electronic health record. If you see a primary care provider, you can also send messages to your care team and make appointments. If you have questions, please call your primary care clinic.  If you do not have a primary care provider, please call [phone] and they will assist you.        Care EveryWhere ID     This is your Care EveryWhere ID. This could be used by other organizations to access your [facility] medical records  [id number]        Your Vitals Were     Pulse Temperature Respirations Height Last Period Pulse Oximetry    105 98.2  F (36.8  C) (Oral) 16 5' 1\" (154.9 cm) (LMP Unknown) 98%    BMI (Body Mass Index)                   23.74 kg/m2            Blood Pressure from Last 3 Encounters:   07/06/17 113/74   06/18/17 93/52   02/19/16 116/69    Weight from Last 3 Encounters:   07/06/17 125 lb 10.6 oz (57 kg)   06/15/17 125 lb (56.7 kg)   06/15/17 120 lb 6.4 oz (54.6 kg)              Today, you had the following     No orders found for display         Today's Medication Changes          These changes are accurate as of: 7/6/17 10:18 AM.  If you have any " questions, ask your nurse or doctor.               Start taking these medicines.        Dose/Directions    ergocalciferol 8000 UNIT/ML drops   Commonly known as:  DRISDOL   Used for:  Exocrine pancreatic insufficiency   Started by:  Trinity Power MD        Dose:  6000 Units   Take 0.75 mLs (6,000 Units) by mouth daily   Quantity:  30 mL   Refills:  11         These medicines have changed or have updated prescriptions.        Dose/Directions    adeks chewable tablet   This may have changed:  how much to take   Used for:  Pancreatic insufficiency        Dose:  1 tablet   Take 1 tablet by mouth 2 times daily   Quantity:  60 tablet   Refills:  3       * amylase-lipase-protease 90863 UNITS Cpep per EC capsule   Commonly known as:  CREON   This may have changed:  additional instructions   Used for:  Pancreatic insufficiency   Changed by:  Caty Gerard RN        3 with meals & 2 with snacks   Quantity:  450 capsule   Refills:  0       * amylase-lipase-protease 94466 UNITS Tabs tablet   Commonly known as:  VIOKACE   This may have changed:  You were already taking a medication with the same name, and this prescription was added. Make sure you understand how and when to take each.   Used for:  Exocrine pancreatic insufficiency   Changed by:  Trinity Power MD        Take 2 Viokace with 2 Creon for meals, 1 Viokace with 1 Creon for snacks.   Quantity:  240 tablet   Refills:  3       * Notice:  This list has 2 medication(s) that are the same as other medications prescribed for you. Read the directions carefully, and ask your doctor or other care provider to review them with you.         Where to get your medicines      These medications were sent to [facility], [place] - [address]  [address] [facility] [place] [address]     Phone:  [phone]     amylase-lipase-protease 84306 UNITS Tabs tablet    ergocalciferol 8000 UNIT/ML drops                Primary Care Provider Office Phone # Fax  #    Marleny Jensenvapalli [phone]       [facility] [address] [facility] [address]        Equal Access to Services     MAIDA BALDWIN : Hadii aad ku hadyulia Selby, jennifer kettyjono, juan ramon kamaya bentley, brunilda merazselene idris. So [facility] [phone].    ATENCIÓN: Si habla español, tiene a liu disposición servicios gratuitos de asistencia lingüística. Llame al [phone].    We comply with applicable federal civil rights laws and [place] laws. We do not discriminate on the basis of race, color, national origin, age, disability sex, sexual orientation or gender identity.            Thank you!     Thank you for choosing [facility]  for your care. Our goal is always to provide you with excellent care. Hearing back from our patients is one way we can continue to improve our services. Please take a few minutes to complete the written survey that you may receive in the mail after your visit with us. Thank you!             Your Updated Medication List - Protect others around you: Learn how to safely use, store and throw away your medicines at www.disposemymeds.org.          This list is accurate as of: 7/6/17 10:18 AM.  Always use your most recent med list.                   Brand Name Dispense Instructions for use Diagnosis    adeks chewable tablet     60 tablet    Take 1 tablet by mouth 2 times daily    Pancreatic insufficiency       alcohol swab prep pads     1 each    Use to check blood glucose 6-8 times daily    Chronic pancreatitis (H)       amitriptyline 100 MG tablet    ELAVIL    30 tablet    Take 1 tablet (100 mg) by mouth At Bedtime    Chronic pancreatitis (H), Acute post-operative pain, Chronic abdominal pain, Encounter for long-term opiate analgesic use       * amylase-lipase-protease 28330 UNITS Cpep per EC capsule    CREON    450 capsule    3 with meals & 2 with snacks    Pancreatic insufficiency       * amylase-lipase-protease 74371  UNITS Tabs tablet    VIOKACE    240 tablet    Take 2 Viokace with 2 Creon for meals, 1 Viokace with 1 Creon for snacks.    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    Diarrhea       glucose 40 % Gel gel     10 each    Take 15 g by mouth every hour as needed.    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)       INSULIN PUMP - OUTPATIENT      1 unit/hour        MAXALT 10 MG tablet   Generic drug:  rizatriptan      Take 10 mg by mouth at onset of headache    Sphincter of Oddi dysfunction, Pancreatic divisum, Recurrent acute pancreatitis       ondansetron 8 MG ODT tab    ZOFRAN-ODT     Every 4-6 hours as needed        PROBIOTIC DAILY PO      Take 2 tablets by mouth daily        REGLAN 10 MG tablet   Generic drug:  metoclopramide      Take 1 tablet by mouth every 6 hours as needed        VITAMIN D3 PO      Take 8,000 Units by mouth daily        * Notice:  This list has 2 medication(s) that are the same as other medications prescribed for you. Read the directions carefully, and ask your doctor or other care provider to review them with you.

## 2017-07-07 ENCOUNTER — OFFICE VISIT (OUTPATIENT)
Dept: EDUCATION SERVICES | Facility: CLINIC | Age: 42
End: 2017-07-07

## 2017-07-07 DIAGNOSIS — Z90.410 DIABETES MELLITUS SECONDARY TO PANCREATECTOMY (H): Primary | ICD-10-CM

## 2017-07-07 DIAGNOSIS — E89.1 DIABETES MELLITUS SECONDARY TO PANCREATECTOMY (H): Primary | ICD-10-CM

## 2017-07-07 DIAGNOSIS — E13.9 DIABETES MELLITUS SECONDARY TO PANCREATECTOMY (H): Primary | ICD-10-CM

## 2017-07-07 NOTE — PROGRESS NOTES
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    She is accompanied by self    Patient Problem List and Family Medical History reviewed for relevant medical history, current medical status, and diabetes risk factors.    Current Diabetes Management per Patient:      :      LMP  (LMP Unknown)  Estimated body mass index is 23.74 kg/(m^2) as calculated from the following:    Height as of 7/6/17: 1.549 m (5' 1\").    Weight as of 7/6/17: 57 kg (125 lb 10.6 oz).   Last 3 BP:   BP Readings from Last 3 Encounters:   07/06/17 113/74   06/18/17 93/52   02/19/16 116/69     History   Smoking Status     Never Smoker   Smokeless Tobacco     Never Used       Labs:  Lab Results   Component Value Date    A1C 13.1 07/06/2017     Lab Results   Component Value Date     06/18/2017     Lab Results   Component Value Date    LDL 43 01/12/2015     HDL Cholesterol   Date Value Ref Range Status   01/12/2015 53 >50 mg/dL Final   ]  GFR Estimate   Date Value Ref Range Status   06/18/2017 >90  Non  GFR Calc   >60 mL/min/1.7m2 Final     GFR Estimate If Black   Date Value Ref Range Status   06/18/2017 >90   GFR Calc   >60 mL/min/1.7m2 Final     Lab Results   Component Value Date    CR 0.44 06/18/2017     No results found for: MICROALBUMIN    Nutrition Review:  Beverly is here today for post pancreatectomy diabetes education. I downloaded her Medtronic pump, see above. She tells me she has gastroparesis and has tried following a low fat low fiber diet but it did not help. She uses a dual or square wave bolus when she eats much of the time. She brings a notebook with her food diary and bg. Her pump rates were adjusted by Dr. Power yesterday.     Diet Recall:   Breakfast:english muffin/pb or cereal/milk or caramel roll, yogurt, coffee  AM snack:nothing  Lunch: turkey sandwich on a pretzel roll, " potato salad, brats  PM snack:fruit  Dinner:chicken, veg, part of a Bloomin Onion or party pizza      Eats out in restaurants: about less than 1 times per week  Beverages: water, diet coke  ETOH: rarely     Physical Activity:    none      Reviewed pump download in conjunction with diet recall today. She states her pump was not displaying her boluses correctly so she had sent it back and they reset it. Current pump download shows missing boluses which she states she does not forget to bolus. We reviewed carb counting again today, all of her carbohydrate totals in the pump download are either 30 or 45 grams carb. I encouraged Beverly to try to be more precise with her bolusing for better bg control. Provided her with a new carb counting book and discussed phone apps to help her carb count when not eating at home.   She tells me she is losing weight and she doesn't know why. Reviewed that high bg leads to unintentional weight loss. She tells me when she is working she is in meetings and she cannot get low so she will underestimate to avoid low bg. Encouraged Beverly to try to eat lower fat foods as many of her foods in her diary are high in fat contributing to ongoing gastroparesis symptoms.   Education provided today on:  AADE Self-Care Behaviors:  Healthy Eating: carbohydrate counting, heart healthy diet and label reading    Pt verbalized understanding of concepts discussed and recommendations provided today.           ASSESSMENT: Pump download shows Beverly is missing boluses and detailed report shows she is overriding her bolus wizard all of the time, which explains why her bg are not well controlled with many bg >400mg/dl on download today. She thinks her pump is not capturing all of her boluses however. I will task this note to Dr. Power today. It is unclear why she is doing this, if it is only to avoid hypoglycemia at work? Verbalized recommendations. She has a endo and diabetes educator in [facility] who  follows her.       PLAN:  Carb counting review  Diet for gastroparesis review, low fat, low fiber, more frequent meals  Enter all carbs  Precisely in pump  AVS printed and provided to patient today.    FOLLOW-UP:  Chart routed to referring provider.    Time Spent: 30 minutes  Encounter Type: Individual    Any diabetes medication dose changes were made via the CDE Protocol and Collaborative Practice Agreement with the patient's referring provider. A copy of this encounter was shared with the provider.

## 2017-07-07 NOTE — PROGRESS NOTES
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  At the time of the procedure, the patient received 84,200 IEQ, or 1,551 IEQ/kg body weight.  Transplant course was complicated by portal vein thrombosis requiring intervention for thrombolysis.  Her main issue since post op has been GI:  Severe gastroparesis and uncontrolled malabsorption that has been refractory to all changes in enzymes and other medications (imodium, tincture of opium).  She does have a gastric pacemaker placed by Dr. Efrem Huber.    At today's visit, Beverly returns because of major struggles with diabetes control.   We reviewed her logs together and she just has many inconsistencies in her response to insulin-- some days meal doses will keep her normal or drive her low, but more often despite meal dosing she is ending up at 200-500 mg/dL post meal.  She had her first episode of DKA 2 weeks ago (admitted here).  She remains on a medtronic insulin pump.  She often uses the dual wave bolus feature because of her gatroparesis.  She has diarrhea every day, about 20 times per day.  She is currently using the Creon 24 at dose of 4 with meals (2 early, 2 at end) and has tried multiple enzymes in past with no success.  She does not have severe hypoglycemia and has been able to treat her lows.  She  won't give a correction bolus at bedtime    Diabetes history:  Current insulin regimen:  Basal 1.0 unit/hour  Bolus:  1 unit per 30g,  , target  mg/dL  Daily totals 24- 33 units/day :  This was taken off pump so did not have download, but most days is around 29 unit/day on avg    Recent hemoglobin A1c levels:  Lab Results   Component Value Date    A1C 13.1 07/06/2017    A1C 12.8 06/15/2017    A1C 5.9 08/05/2015    A1C 5.4 01/12/2015    A1C 5.6 06/26/2014       Hypoglycemia history:  Yes, mild to moderate.  The patient has had none episodes of severe hypoglycemia (seizure, coma, or neuroglycopenic symptoms severe enough to require assistance from another person).  Blood sugars were reviewed from the patient records and/or the meter download.      Nutrition:  All oral feeds, no TF  Enzymes:  Creon 24, 4 with meals  Vitamins:  Was standard MVI and is changing to MVW.  Takes gummy vitamin D at dose of 6000 unit per day      Review of systems:  Negative except as above. 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) 6/28/2013     Endometriosis      Endometriosis 9/9/2011     Exocrine pancreatic insufficiency 6/28/2013     Gastro-oesophageal reflux disease      Gastroparesis      Gastroparesis 7/23/2012     Iron deficiency anemia 7/12/2013     Other chronic pain      Pancreatic disease     history of pancreatitis     Pancreatic divisum      Pancreatic divisum 10/1/2012     Pneumonia, organism 7/17/2013     PONV (postoperative nausea and vomiting)     Nausea     Portal vein thrombosis 6/29/2013     Recurrent acute pancreatitis 1/17/2013     Sphincter of Oddi dysfunction  1/17/2013     Syncope     X 2     Type 2 diabetes mellitus without complications (H)      Past Surgical History:   Procedure Laterality Date     APPENDECTOMY       CHOLECYSTECTOMY       COLONOSCOPY  5/21/2014    Procedure: COMBINED COLONOSCOPY, SINGLE BIOPSY/POLYPECTOMY BY BIOPSY;  Surgeon: Hugo Lind MD;  Location: [facility]     ENDOSCOPIC INSERTION TUBE GASTROSTOMY  2/5/2014    Procedure: ENDOSCOPIC INSERTION TUBE GASTROSTOMY;  Endoscopic percutaneous gastro-jejunostomy tube placement (removal of previous gastrostomy tube)  ;  Surgeon: Sharon Oh MD;  Location: [facility]     ENDOSCOPIC RETROGRADE CHOLANGIOPANCREATOGRAM  9/9/2011    Procedure:ENDOSCOPIC RETROGRADE CHOLANGIOPANCREATOGRAM; Endoscopic Retrograde Cholangiopancreatogram with C Arm, Biliary Sphincterotomy, insertion Bile Duct Stent X 1; Surgeon:JABIER HEATH; Location:[facility]     ENDOSCOPIC RETROGRADE CHOLANGIOPANCREATOGRAM  12/15/2011    Procedure:ENDOSCOPIC RETROGRADE CHOLANGIOPANCREATOGRAM; Endoscopic Retrograde Cholangiopancreatogram (c-arm), with pancreatic stent placement, sphincterotomy; Surgeon:JABIER HEATH; Location:[facility]     ENDOSCOPIC RETROGRADE CHOLANGIOPANCREATOGRAM  6/14/2012    Procedure: ENDOSCOPIC RETROGRADE CHOLANGIOPANCREATOGRAM;  endoscopic retrograde cholangiopancreatogram with pancreatic stent placement  (c-arm);  Surgeon: Jabier Heath MD;  Location: [facility]     ESOPHAGOSCOPY, GASTROSCOPY, DUODENOSCOPY (EGD), COMBINED  11/9/2011    Procedure:COMBINED ESOPHAGOSCOPY, GASTROSCOPY, DUODENOSCOPY (EGD); Surgeon:TANIKA MOHAN; Location: GI     ESOPHAGOSCOPY, GASTROSCOPY, DUODENOSCOPY (EGD), COMBINED  5/21/2014    Procedure: COMBINED ESOPHAGOSCOPY, GASTROSCOPY, DUODENOSCOPY (EGD), BIOPSY SINGLE OR MULTIPLE;  Surgeon: Hugo Lind MD;  Location: [facility]     HC CHANGE GASTROSTOMY TUBE PERC, WO IMAGING OR ENDO GUIDE N/A 11/25/2014    Procedure: CHANGE GASTROSTOMY TUBE WITHOUT SCOPE;  Surgeon: Sharon Oh  MD Arsenio;  Location: [facility]     HC IMPLANT PERIPH/GASTRIC NEUROSTIM/  1/11/2012    gastric pacemaker     HC REPLACE DUODENOSTOMY/JEJUNOSTOMY TUBE PERCUTANEOUS  2/12/2014    Procedure: REPLACE JEJUNOSTOMY TUBE, PERCUTANEOUS;  Surgeon: Sharon Oh MD;  Location: [facility]     HC UGI ENDOSCOPY W EUS  9/7/2011    Procedure:COMBINED ENDOSCOPIC ULTRASOUND, ESOPHAGOSCOPY, GASTROSCOPY, DUODENOSCOPY (EGD); Surgeon:TREE DEAN; Location:[facility]     HC UGI ENDOSCOPY W EUS  12/4/2012    Procedure: COMBINED ENDOSCOPIC ULTRASOUND, ESOPHAGOSCOPY, GASTROSCOPY, DUODENOSCOPY (EGD);  Surgeon: Sharon Oh MD;  Location: [facility]     HC UGI ENDOSCOPY W EUS  5/8/2013    Procedure: COMBINED ENDOSCOPIC ULTRASOUND, ESOPHAGOSCOPY, GASTROSCOPY, DUODENOSCOPY (EGD);  Surgeon: Tree Dean MD;  Location: [facility]     HC UGI ENDOSCOPY W PLACEMENT GASTROSTOMY TUBE PERCUT N/A 2/19/2016    Procedure: COMBINED ESOPHAGOSCOPY, GASTROSCOPY, DUODENOSCOPY (EGD), PLACE PERCUTANEOUS ENDOSCOPIC GASTROSTOMY TUBE;  Surgeon: Sharon Oh MD;  Location: [facility]     LAPAROSCOPIC IMPLANT GASTRIC STIMULATOR  1/11/2012    Procedure:LAPAROSCOPIC IMPLANT GASTRIC STIMULATOR; LAPAROSCOPIC IMPLANT GASTRIC STIMULATOR ; Surgeon:CHONG HART; [facility]     LAPAROSCOPIC SALPINGO-OOPHORECTOMY       LAPAROSCOPIC SALPINGOTOMY      R     LAPAROTOMY EXPLORATORY  6/29/2013    Procedure: LAPAROTOMY EXPLORATORY;  Exploratory laparotomy, portal vein declotting with tissue plasminogen activator administration, thrombectomy and embolectomy of portal vein, intraoperative trans-sonic flow monitoring, intraoperative ultrasound;  Surgeon: Rajendra Cruz MD;  Location: [facility]     neurostimulator[  5/2011     PANCREATECTOMY, TRANSPLANT AUTO ISLET CELL, COMBINED  6/28/2013    Procedure: COMBINED PANCREATECTOMY, TRANSPLANT AUTO ISLET CELL;  Total Pancreatectomy, Auto Islet Cell Transplant             Anesthesia General with block;  Surgeon: Rajendra Cruz MD;   Location: [facility]     PICC INSERTION Right 2017    5fr TL Bard PICC, 37cm (3cm external) in the R basilic vein w/ tip in the low SVC     TUBAL LIGATION         Family History:  Family History   Problem Relation Age of Onset     C.A.D. Father      GI: V, D, anorexia     Blood Disease Father      lymphoma -  from pulmonary fibrosis.     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      Physical Exam:  General:  Well-appearing, NAD  Psych:  Communicative, with normal affect         Assessment:  1.  Post-pancreatectomy diabetes mellitus, s/p total pancreatectomy and islet autotransplant.    Beverly is a 41 year old with history of chronic pancreatitis who is s/p total pancreatectomy and islet autotransplant.  She has extremely poor diabetes control.  She is underbolues on carbs, but also appears to be on too much basal as her numbers really fall many nights without a correction dose.  The dose increase in the carbs today is pretty modest, likely is going to need more.    We also discussed insulin pump options because we could look at a 670g pump.  In theory that is a really nice option for her because of the basal adjustment and suspend for impending hypoglycemia.  However, the challenge is that she would need to be much more aggressive with bolusing because persistent highs will 'kick her out' of the closed loop mode.    We addressed vitamin deficiencies as part of this visit as noted below.       She has had  intractable diabetes despite multiple attempts in the past at adjusting PERT regimen.  However, since she has not tried any adjustments in some time, we discussed a trial of combination viokace/creon (which has not been done previously) though with her gastroparesis if the ready release of viokase would be helpful.  We also discussed possibility of another round of abx for SIBO which may have been done in the distant past but not in several years.    Plan:  1.  Changes to current diabetes regimen:  Patient Instructions   1)  Current pump settings:  Basal 1.0 unit/hour  Bolus:  1 unit per 30g,  , target  mg/dL  Daily totals 24- 33 units/day    Let's change these to  Basal 0.8 units per hour  Bolus set I:C ratio at 25g    2)  You are adding a MVW.  Let's also change your vitamin D to a liquid form, and add an OTC vitamin A daily.    3)  For your absorption issues, let's do a trial of mixing Viokace with Creon-- 2 Viokace + 2 Creon instead of 4 Creon.      Follow Up October 5 at 9:20am          Contact me for questions at [phone] or [phone].  Emergency number to reach pediatric endocrinology after hours is [phone].        Trinity Power MD  , Pediatric Endocrinology and Diabetes  [facility] [place]  [facility]      I spent a total of 40 minutes with this complex patient with life threatening diabetes (recent DKA), malnutrition and GI issues with >50% spent on counseling on plan of care

## 2017-07-07 NOTE — Clinical Note
Addy Petersen, I saw Beverly Elena today. I downloaded her pump, she is missing many boluses and she is overriding her bolus wizard all of the time and undercounting her carbs. She tells me she is worried about hypoglycemia at work so she will undercount at times. However she feels she is bolusing and something is wrong with the pump again like before. I don't think anything is wrong with her pump, I think something else is going on with her which should be explored. (eating disorder, anxiety?, fear of low bg?......) Sherri

## 2017-07-07 NOTE — MR AVS SNAPSHOT
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)    -  1       Follow-ups after your visit        Your next 10 appointments already scheduled     Oct 05, 2017  9:20 AM CDT   (Arrive by 9:05 AM)   Return Auto Islet with Trinity Power MD   [facility] Solid Organ Transplant ([facility])    [address] [place] [id number] [phone]              Who to contact     Please call your clinic at [phone] to:    Ask questions about your health    Make or cancel appointments    Discuss your medicines    Learn about your test results    Speak to your doctor   If you have compliments or concerns about an experience at your clinic, or if you wish to file a complaint, please contact [facility] Patient Relations at [phone] or email us at [email or web address]         Additional Information About Your Visit        MyChart Information     SvitStylet gives you secure access to your electronic health record. If you see a primary care provider, you can also send messages to your care team and make appointments. If you have questions, please call your primary care clinic.  If you do not have a primary care provider, please call [phone] and they will assist you.      Lessonwriter is an electronic gateway that provides easy, online access to your medical records. With Lessonwriter, you can request a clinic appointment, read your test results, renew a prescription or communicate with your care team.     To access your existing account, please contact your [facility] or call [phone] for assistance.        Care EveryWhere  ID     This is your Care EveryWhere ID. This could be used by other organizations to access your [place] medical records  [id number]        Your Vitals Were     Last Period                   (LMP Unknown)            Blood Pressure from Last 3 Encounters:   07/06/17 113/74   06/18/17 93/52   02/19/16 116/69    Weight from Last 3 Encounters:   07/06/17 57 kg (125 lb 10.6 oz)   06/15/17 56.7 kg (125 lb)   06/15/17 54.6 kg (120 lb 6.4 oz)              We Performed the Following     DIABETES EDUCATION - Individual  []          Today's Medication Changes          These changes are accurate as of: 7/7/17  3:51 PM.  If you have any questions, ask your nurse or doctor.               Start taking these medicines.        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        Dose/Directions    adeks chewable tablet   This may have changed:  how much to take   Used for:  Pancreatic insufficiency        Dose:  1 tablet   Take 1 tablet by mouth 2 times daily   Quantity:  60 tablet   Refills:  3       * amylase-lipase-protease 78385 UNITS Cpep per EC capsule   Commonly known as:  CREON   This may have changed:  additional instructions   Used for:  Pancreatic insufficiency   Changed by:  Caty Gerard, RN        3 with meals & 2 with snacks   Quantity:  450 capsule   Refills:  0       * amylase-lipase-protease 06427 UNITS Tabs tablet   Commonly known as:  VIOKACE   This may have changed:  Another medication with the same name was changed. Make sure you understand how and when to take each.   Used for:  Exocrine pancreatic insufficiency   Changed by:  Trinity Power MD        Take 2 Viokace with 2 Creon for meals, 1 Viokace with 1  Creon for snacks.   Quantity:  240 tablet   Refills:  3       * Notice:  This list has 2 medication(s) that are the same as other medications prescribed for you. Read the directions carefully, and ask your doctor or other care provider to review them with you.         Where to get your medicines      These medications were sent to [facility], [place] - [address] [facility]  [address] [facility] [place] [address]     Phone:  [phone]     glucagon 1 MG kit                Primary Care Provider Office Phone # Fax #    Marleny Hathaway [phone]       [facility] [address] [facility] [place] [address]        Equal Access to Services     MAIDA BALDWIN : Hadii aad ku hadasho Soerasto, waaxda luqadaha, qaybta kaalmada adereidyada, brunilda gaspar . So [facility] [phone].    ATENCIÓN: Si habla español, tiene a liu disposición servicios gratuitos de asistencia lingüística. Llame al [phone].    We comply with applicable federal civil rights laws and [place] laws. We do not discriminate on the basis of race, color, national origin, age, disability sex, sexual orientation or gender identity.            Thank you!     Thank you for choosing [facility] DIABETES  for your care. Our goal is always to provide you with excellent care. Hearing back from our patients is one way we can continue to improve our services. Please take a few minutes to complete the written survey that you may receive in the mail after your visit with us. Thank you!             Your Updated Medication List - Protect others around you: Learn how to safely use, store and throw away your medicines at www.disposemymeds.org.          This list is accurate as of: 7/7/17  3:51 PM.  Always use your most recent med list.                   Brand Name Dispense Instructions for use Diagnosis    adeks chewable tablet     60 tablet    Take 1 tablet by mouth 2 times daily    Pancreatic insufficiency        alcohol swab prep pads     1 each    Use to check blood glucose 6-8 times daily    Chronic pancreatitis (H)       amitriptyline 100 MG tablet    ELAVIL    30 tablet    Take 1 tablet (100 mg) by mouth At Bedtime    Chronic pancreatitis (H), Acute post-operative pain, Chronic abdominal pain, Encounter for long-term opiate analgesic use       * amylase-lipase-protease 26941 UNITS Cpep per EC capsule    CREON    450 capsule    3 with meals & 2 with snacks    Pancreatic insufficiency       * amylase-lipase-protease 50500 UNITS Tabs tablet    VIOKACE    240 tablet    Take 2 Viokace with 2 Creon for meals, 1 Viokace with 1 Creon for snacks.    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)       INSULIN PUMP - OUTPATIENT      1 unit/hour        MAXALT 10 MG tablet   Generic drug:  rizatriptan      Take 10 mg by mouth at onset of headache    Sphincter of Oddi dysfunction,  Pancreatic divisum, Recurrent acute pancreatitis       ondansetron 8 MG ODT tab    ZOFRAN-ODT     Every 4-6 hours as needed        PROBIOTIC DAILY PO      Take 2 tablets by mouth daily        REGLAN 10 MG tablet   Generic drug:  metoclopramide      Take 1 tablet by mouth every 6 hours as needed        VITAMIN D3 PO      Take 8,000 Units by mouth daily        * Notice:  This list has 2 medication(s) that are the same as other medications prescribed for you. Read the directions carefully, and ask your doctor or other care provider to review them with you.

## 2017-07-08 DIAGNOSIS — E89.1 POST-PANCREATECTOMY DIABETES (H): Primary | ICD-10-CM

## 2017-07-08 DIAGNOSIS — E13.9 POST-PANCREATECTOMY DIABETES (H): Primary | ICD-10-CM

## 2017-07-08 DIAGNOSIS — Z90.410 POST-PANCREATECTOMY DIABETES (H): Primary | ICD-10-CM

## 2017-07-09 LAB
FOLATE RBC-MCNC: 776 NG/ML
HCT VFR BLD CALC: 41.2 %

## 2017-07-25 NOTE — PROGRESS NOTES
"Diabetes Education Note:    Beverly attended today's visit with her .  She had a pancreatectomy with auto-islet transplant done 4 years ago.  Subsequently started using an insulin pump for management for post-op diabetes.    Presents today with no clear idea of why she is here.   Lab Results   Component Value Date    A1C 13.1 07/06/2017    A1C 12.8 06/15/2017    A1C 5.9 08/05/2015    A1C 5.4 01/12/2015    A1C 5.6 06/26/2014     She is using a Medtronic insulin pump, model 551 but is not wearing the CGM as she finds it annoying and unreliable.  She states that she was ill toward the beginning of the year, and now her  Blood glucoses are all out of control, and she states that she is at a loss as to how to get them back into control.     She states that she uses the bolus calculator in her pump, however she did not allow me to download her pump during our visit today, stating \"there's not much to see there.\"    Reviewed general care of insulin pump and use of bolus calculator, need to count carbohydrates accurately, check blood glucose on a regular basis and change infusion sets out on a regular basis.      She sees a diabetes educator associated with her endocrinologist in [place].  She is somewhat interested in upgrading her insulin pump to the Medtronic 670G, but states that she will take that up with her team in [place].      Time spent in this visit:  Less than 30 minutes.  No charge for visit.       "

## 2017-11-12 ENCOUNTER — HEALTH MAINTENANCE LETTER (OUTPATIENT)
Age: 42
End: 2017-11-12

## 2018-03-11 ENCOUNTER — APPOINTMENT (OUTPATIENT)
Dept: GENERAL RADIOLOGY | Facility: CLINIC | Age: 43
DRG: 391 | End: 2018-03-11
Attending: SURGERY
Payer: COMMERCIAL

## 2018-03-11 ENCOUNTER — HOSPITAL ENCOUNTER (INPATIENT)
Facility: CLINIC | Age: 43
LOS: 7 days | Discharge: HOME | DRG: 391 | End: 2018-03-18
Attending: SURGERY | Admitting: SURGERY
Payer: COMMERCIAL

## 2018-03-11 DIAGNOSIS — Z90.410 DIABETES MELLITUS SECONDARY TO PANCREATECTOMY (H): Primary | ICD-10-CM

## 2018-03-11 DIAGNOSIS — E13.9 DIABETES MELLITUS SECONDARY TO PANCREATECTOMY (H): Primary | ICD-10-CM

## 2018-03-11 DIAGNOSIS — R62.7 FAILURE TO THRIVE IN ADULT: ICD-10-CM

## 2018-03-11 DIAGNOSIS — R11.0 CHRONIC NAUSEA: ICD-10-CM

## 2018-03-11 DIAGNOSIS — E83.39 HYPOPHOSPHATEMIA: ICD-10-CM

## 2018-03-11 DIAGNOSIS — R10.9 CHRONIC ABDOMINAL PAIN: ICD-10-CM

## 2018-03-11 DIAGNOSIS — Z90.410 ABSENCE OF PANCREAS, ACQUIRED TOTAL: ICD-10-CM

## 2018-03-11 DIAGNOSIS — K86.89 PANCREATIC INSUFFICIENCY: ICD-10-CM

## 2018-03-11 DIAGNOSIS — Z78.9 ON ENTERAL NUTRITION: ICD-10-CM

## 2018-03-11 DIAGNOSIS — Z78.9 ON TOTAL PARENTERAL NUTRITION: ICD-10-CM

## 2018-03-11 DIAGNOSIS — R19.7 DIARRHEA, UNSPECIFIED TYPE: ICD-10-CM

## 2018-03-11 DIAGNOSIS — E87.6 HYPOKALEMIA: ICD-10-CM

## 2018-03-11 DIAGNOSIS — K63.8219 SMALL INTESTINAL BACTERIAL OVERGROWTH: ICD-10-CM

## 2018-03-11 DIAGNOSIS — K59.00 CONSTIPATION, UNSPECIFIED CONSTIPATION TYPE: ICD-10-CM

## 2018-03-11 DIAGNOSIS — Z45.2 PICC (PERIPHERALLY INSERTED CENTRAL CATHETER) IN PLACE: ICD-10-CM

## 2018-03-11 DIAGNOSIS — E89.1 DIABETES MELLITUS SECONDARY TO PANCREATECTOMY (H): Primary | ICD-10-CM

## 2018-03-11 DIAGNOSIS — G89.29 CHRONIC ABDOMINAL PAIN: ICD-10-CM

## 2018-03-11 DIAGNOSIS — K21.00 GASTROESOPHAGEAL REFLUX DISEASE WITH ESOPHAGITIS: ICD-10-CM

## 2018-03-11 LAB
ANION GAP SERPL CALCULATED.3IONS-SCNC: 11 MMOL/L (ref 3–14)
ANION GAP SERPL CALCULATED.3IONS-SCNC: 16 MMOL/L (ref 3–14)
APTT PPP: 23 SEC (ref 22–37)
BASOPHILS # BLD AUTO: 0 10E9/L (ref 0–0.2)
BASOPHILS NFR BLD AUTO: 0.1 %
BUN SERPL-MCNC: 5 MG/DL (ref 7–30)
BUN SERPL-MCNC: 6 MG/DL (ref 7–30)
CALCIUM SERPL-MCNC: 7.6 MG/DL (ref 8.5–10.1)
CALCIUM SERPL-MCNC: 7.7 MG/DL (ref 8.5–10.1)
CHLORIDE SERPL-SCNC: 102 MMOL/L (ref 94–109)
CHLORIDE SERPL-SCNC: 106 MMOL/L (ref 94–109)
CO2 SERPL-SCNC: 15 MMOL/L (ref 20–32)
CO2 SERPL-SCNC: 17 MMOL/L (ref 20–32)
CREAT SERPL-MCNC: 0.43 MG/DL (ref 0.52–1.04)
CREAT SERPL-MCNC: 0.46 MG/DL (ref 0.52–1.04)
DIFFERENTIAL METHOD BLD: NORMAL
EOSINOPHIL # BLD AUTO: 0 10E9/L (ref 0–0.7)
EOSINOPHIL NFR BLD AUTO: 0.6 %
ERYTHROCYTE [DISTWIDTH] IN BLOOD BY AUTOMATED COUNT: 14 % (ref 10–15)
GFR SERPL CREATININE-BSD FRML MDRD: >90 ML/MIN/1.7M2
GFR SERPL CREATININE-BSD FRML MDRD: >90 ML/MIN/1.7M2
GLUCOSE BLDC GLUCOMTR-MCNC: 181 MG/DL (ref 70–99)
GLUCOSE BLDC GLUCOMTR-MCNC: 196 MG/DL (ref 70–99)
GLUCOSE BLDC GLUCOMTR-MCNC: 204 MG/DL (ref 70–99)
GLUCOSE BLDC GLUCOMTR-MCNC: 278 MG/DL (ref 70–99)
GLUCOSE BLDC GLUCOMTR-MCNC: 278 MG/DL (ref 70–99)
GLUCOSE BLDC GLUCOMTR-MCNC: 352 MG/DL (ref 70–99)
GLUCOSE BLDC GLUCOMTR-MCNC: 367 MG/DL (ref 70–99)
GLUCOSE SERPL-MCNC: 169 MG/DL (ref 70–99)
GLUCOSE SERPL-MCNC: 368 MG/DL (ref 70–99)
HBA1C MFR BLD: 15.9 % (ref 4.3–6)
HCT VFR BLD AUTO: 35 % (ref 35–47)
HGB BLD-MCNC: 12.7 G/DL (ref 11.7–15.7)
IMM GRANULOCYTES # BLD: 0 10E9/L (ref 0–0.4)
IMM GRANULOCYTES NFR BLD: 0.1 %
INR PPP: 1.2 (ref 0.86–1.14)
LYMPHOCYTES # BLD AUTO: 1.7 10E9/L (ref 0.8–5.3)
LYMPHOCYTES NFR BLD AUTO: 25.3 %
MAGNESIUM SERPL-MCNC: 1.7 MG/DL (ref 1.6–2.3)
MCH RBC QN AUTO: 31.8 PG (ref 26.5–33)
MCHC RBC AUTO-ENTMCNC: 36.3 G/DL (ref 31.5–36.5)
MCV RBC AUTO: 88 FL (ref 78–100)
MONOCYTES # BLD AUTO: 0.5 10E9/L (ref 0–1.3)
MONOCYTES NFR BLD AUTO: 6.9 %
NEUTROPHILS # BLD AUTO: 4.5 10E9/L (ref 1.6–8.3)
NEUTROPHILS NFR BLD AUTO: 67 %
NRBC # BLD AUTO: 0 10*3/UL
NRBC BLD AUTO-RTO: 0 /100
PHOSPHATE SERPL-MCNC: 2.4 MG/DL (ref 2.5–4.5)
PLATELET # BLD AUTO: 166 10E9/L (ref 150–450)
POTASSIUM SERPL-SCNC: 2.7 MMOL/L (ref 3.4–5.3)
POTASSIUM SERPL-SCNC: 3.1 MMOL/L (ref 3.4–5.3)
RBC # BLD AUTO: 3.99 10E12/L (ref 3.8–5.2)
SODIUM SERPL-SCNC: 133 MMOL/L (ref 133–144)
SODIUM SERPL-SCNC: 134 MMOL/L (ref 133–144)
WBC # BLD AUTO: 6.8 10E9/L (ref 4–11)

## 2018-03-11 PROCEDURE — 25000125 ZZHC RX 250: Performed by: PHYSICIAN ASSISTANT

## 2018-03-11 PROCEDURE — 80048 BASIC METABOLIC PNL TOTAL CA: CPT | Performed by: PHYSICIAN ASSISTANT

## 2018-03-11 PROCEDURE — 12000025 ZZH R&B TRANSPLANT INTERMEDIATE

## 2018-03-11 PROCEDURE — 40000802 ZZH SITE CHECK

## 2018-03-11 PROCEDURE — 36415 COLL VENOUS BLD VENIPUNCTURE: CPT | Performed by: SURGERY

## 2018-03-11 PROCEDURE — 00000146 ZZHCL STATISTIC GLUCOSE BY METER IP

## 2018-03-11 PROCEDURE — 83735 ASSAY OF MAGNESIUM: CPT | Performed by: PHYSICIAN ASSISTANT

## 2018-03-11 PROCEDURE — 83036 HEMOGLOBIN GLYCOSYLATED A1C: CPT | Performed by: PHYSICIAN ASSISTANT

## 2018-03-11 PROCEDURE — 74019 RADEX ABDOMEN 2 VIEWS: CPT

## 2018-03-11 PROCEDURE — 36415 COLL VENOUS BLD VENIPUNCTURE: CPT

## 2018-03-11 PROCEDURE — 25000132 ZZH RX MED GY IP 250 OP 250 PS 637: Performed by: PHYSICIAN ASSISTANT

## 2018-03-11 PROCEDURE — 85610 PROTHROMBIN TIME: CPT | Performed by: PHYSICIAN ASSISTANT

## 2018-03-11 PROCEDURE — 85730 THROMBOPLASTIN TIME PARTIAL: CPT | Performed by: PHYSICIAN ASSISTANT

## 2018-03-11 PROCEDURE — 27210443 ZZH NUTRITION PRODUCT SPECIALIZED PACKET

## 2018-03-11 PROCEDURE — 36415 COLL VENOUS BLD VENIPUNCTURE: CPT | Performed by: PHYSICIAN ASSISTANT

## 2018-03-11 PROCEDURE — 85025 COMPLETE CBC W/AUTO DIFF WBC: CPT | Performed by: PHYSICIAN ASSISTANT

## 2018-03-11 PROCEDURE — 80048 BASIC METABOLIC PNL TOTAL CA: CPT | Performed by: SURGERY

## 2018-03-11 PROCEDURE — 84100 ASSAY OF PHOSPHORUS: CPT | Performed by: PHYSICIAN ASSISTANT

## 2018-03-11 PROCEDURE — 25000128 H RX IP 250 OP 636: Performed by: PHYSICIAN ASSISTANT

## 2018-03-11 PROCEDURE — 25000132 ZZH RX MED GY IP 250 OP 250 PS 637: Performed by: SURGERY

## 2018-03-11 PROCEDURE — 40000141 ZZH STATISTIC PERIPHERAL IV START W/O US GUIDANCE

## 2018-03-11 PROCEDURE — 25000128 H RX IP 250 OP 636: Performed by: SURGERY

## 2018-03-11 RX ORDER — DULOXETIN HYDROCHLORIDE 20 MG/1
20 CAPSULE, DELAYED RELEASE ORAL 2 TIMES DAILY
Status: DISCONTINUED | OUTPATIENT
Start: 2018-03-11 | End: 2018-03-13

## 2018-03-11 RX ORDER — OXYCODONE HCL 5 MG/5 ML
5-10 SOLUTION, ORAL ORAL EVERY 4 HOURS PRN
Status: DISCONTINUED | OUTPATIENT
Start: 2018-03-11 | End: 2018-03-18 | Stop reason: HOSPADM

## 2018-03-11 RX ORDER — ONDANSETRON 4 MG/1
4 TABLET, ORALLY DISINTEGRATING ORAL EVERY 6 HOURS PRN
Status: DISCONTINUED | OUTPATIENT
Start: 2018-03-11 | End: 2018-03-18 | Stop reason: HOSPADM

## 2018-03-11 RX ORDER — AMITRIPTYLINE HYDROCHLORIDE 50 MG/1
100 TABLET ORAL AT BEDTIME
Status: DISCONTINUED | OUTPATIENT
Start: 2018-03-11 | End: 2018-03-18 | Stop reason: HOSPADM

## 2018-03-11 RX ORDER — HEPARIN SODIUM,PORCINE 10 UNIT/ML
2-5 VIAL (ML) INTRAVENOUS
Status: COMPLETED | OUTPATIENT
Start: 2018-03-11 | End: 2018-03-12

## 2018-03-11 RX ORDER — LIDOCAINE 40 MG/G
CREAM TOPICAL
Status: DISCONTINUED | OUTPATIENT
Start: 2018-03-11 | End: 2018-03-16 | Stop reason: CLARIF

## 2018-03-11 RX ORDER — POTASSIUM CL/LIDO/0.9 % NACL 10MEQ/0.1L
10 INTRAVENOUS SOLUTION, PIGGYBACK (ML) INTRAVENOUS
Status: COMPLETED | OUTPATIENT
Start: 2018-03-11 | End: 2018-03-12

## 2018-03-11 RX ORDER — ONDANSETRON 2 MG/ML
4 INJECTION INTRAMUSCULAR; INTRAVENOUS EVERY 6 HOURS PRN
Status: DISCONTINUED | OUTPATIENT
Start: 2018-03-11 | End: 2018-03-18 | Stop reason: HOSPADM

## 2018-03-11 RX ORDER — NALOXONE HYDROCHLORIDE 0.4 MG/ML
.1-.4 INJECTION, SOLUTION INTRAMUSCULAR; INTRAVENOUS; SUBCUTANEOUS
Status: DISCONTINUED | OUTPATIENT
Start: 2018-03-11 | End: 2018-03-18 | Stop reason: HOSPADM

## 2018-03-11 RX ORDER — RIZATRIPTAN BENZOATE 10 MG/1
10 TABLET ORAL
Status: DISCONTINUED | OUTPATIENT
Start: 2018-03-11 | End: 2018-03-18 | Stop reason: HOSPADM

## 2018-03-11 RX ORDER — OXYCODONE HYDROCHLORIDE 5 MG/1
5-10 TABLET ORAL
Status: DISCONTINUED | OUTPATIENT
Start: 2018-03-11 | End: 2018-03-11

## 2018-03-11 RX ORDER — ESOMEPRAZOLE MAGNESIUM 40 MG/1
40 CAPSULE, DELAYED RELEASE ORAL 2 TIMES DAILY
Status: DISCONTINUED | OUTPATIENT
Start: 2018-03-11 | End: 2018-03-12

## 2018-03-11 RX ORDER — LIDOCAINE 40 MG/G
CREAM TOPICAL
Status: DISCONTINUED | OUTPATIENT
Start: 2018-03-11 | End: 2018-03-18 | Stop reason: HOSPADM

## 2018-03-11 RX ORDER — NICOTINE POLACRILEX 4 MG
15-30 LOZENGE BUCCAL
Status: DISCONTINUED | OUTPATIENT
Start: 2018-03-11 | End: 2018-03-18 | Stop reason: HOSPADM

## 2018-03-11 RX ORDER — SODIUM CHLORIDE 9 MG/ML
INJECTION, SOLUTION INTRAVENOUS CONTINUOUS
Status: DISCONTINUED | OUTPATIENT
Start: 2018-03-11 | End: 2018-03-11

## 2018-03-11 RX ORDER — PROCHLORPERAZINE MALEATE 5 MG
10 TABLET ORAL EVERY 6 HOURS PRN
Status: DISCONTINUED | OUTPATIENT
Start: 2018-03-11 | End: 2018-03-12

## 2018-03-11 RX ORDER — PROCHLORPERAZINE 25 MG
25 SUPPOSITORY, RECTAL RECTAL EVERY 12 HOURS PRN
Status: DISCONTINUED | OUTPATIENT
Start: 2018-03-11 | End: 2018-03-12

## 2018-03-11 RX ORDER — DEXTROSE MONOHYDRATE 25 G/50ML
25-50 INJECTION, SOLUTION INTRAVENOUS
Status: DISCONTINUED | OUTPATIENT
Start: 2018-03-11 | End: 2018-03-18 | Stop reason: HOSPADM

## 2018-03-11 RX ORDER — ONDANSETRON 2 MG/ML
4 INJECTION INTRAMUSCULAR; INTRAVENOUS EVERY 6 HOURS PRN
Status: DISCONTINUED | OUTPATIENT
Start: 2018-03-11 | End: 2018-03-11

## 2018-03-11 RX ADMIN — Medication 10 MEQ: at 20:55

## 2018-03-11 RX ADMIN — Medication 10 MEQ: at 19:36

## 2018-03-11 RX ADMIN — POTASSIUM & SODIUM PHOSPHATES POWDER PACK 280-160-250 MG 2 PACKET: 280-160-250 PACK at 21:53

## 2018-03-11 RX ADMIN — Medication 10 MEQ: at 21:53

## 2018-03-11 RX ADMIN — Medication 10 MEQ: at 16:55

## 2018-03-11 RX ADMIN — DEXTROSE AND SODIUM CHLORIDE: 5; 900 INJECTION, SOLUTION INTRAVENOUS at 16:13

## 2018-03-11 RX ADMIN — DULOXETINE 20 MG: 20 CAPSULE, DELAYED RELEASE ORAL at 20:12

## 2018-03-11 RX ADMIN — Medication 10 MEQ: at 23:01

## 2018-03-11 RX ADMIN — VITAMIN D, TAB 1000IU (100/BT) 5000 UNITS: 25 TAB at 16:17

## 2018-03-11 RX ADMIN — OXYCODONE HYDROCHLORIDE 10 MG: 5 SOLUTION ORAL at 14:41

## 2018-03-11 RX ADMIN — HUMAN INSULIN 5 UNITS/HR: 100 INJECTION, SOLUTION SUBCUTANEOUS at 16:13

## 2018-03-11 RX ADMIN — POTASSIUM & SODIUM PHOSPHATES POWDER PACK 280-160-250 MG 2 PACKET: 280-160-250 PACK at 16:17

## 2018-03-11 RX ADMIN — VITAMIN D, TAB 1000IU (100/BT) 3000 UNITS: 25 TAB at 16:17

## 2018-03-11 RX ADMIN — AMITRIPTYLINE HYDROCHLORIDE 100 MG: 50 TABLET, FILM COATED ORAL at 20:12

## 2018-03-11 RX ADMIN — ESOMEPRAZOLE MAGNESIUM 40 MG: 40 CAPSULE, DELAYED RELEASE PELLETS ORAL at 20:12

## 2018-03-11 RX ADMIN — Medication 10 MEQ: at 18:39

## 2018-03-11 RX ADMIN — ONDANSETRON 4 MG: 2 INJECTION INTRAMUSCULAR; INTRAVENOUS at 14:41

## 2018-03-11 RX ADMIN — PROCHLORPERAZINE EDISYLATE 10 MG: 5 INJECTION INTRAMUSCULAR; INTRAVENOUS at 16:16

## 2018-03-11 RX ADMIN — Medication 1 ML: at 16:55

## 2018-03-11 NOTE — PROGRESS NOTES
CLINICAL NUTRITION SERVICES - ASSESSMENT NOTE     Nutrition Prescription    RECOMMENDATIONS FOR MDs/PROVIDERS TO ORDER:  Do not start PN until K+ WNL and phos >2. Replace lytes (K+, Mg++ and phos) accordingly as pt at high risk for refeeding syndrome.    Malnutrition Status:    Severe malnutrition in the context of chronic illness    Recommendations already ordered by Registered Dietitian (RD):  1. Sent PN change order to the pharmacist:  --Use dosing weight 44 kg  --Begin CPN, goal volume 1080 ml/day with initial 100g Dex daily (GIR1.5), 65g AA daily, and 250 ml 20% IV lipids Monday thru Friday.  Micro/Rx: standard per PharmD  --ONLY once pt receives ~100% of initial continuous PN volume with K+/Mg++/Phos WNL, advance PN dex by 55 g every 1-2 days (pending lytes/Glu and Pharm D/RD discretion) to initial goal of 210g Dex to increase provisions to 1331 kcals (30 kcal/kg/day), 1.5 g PRO/kg/day, GIR 3.3 with 27% kcals from Fat.    2. Begin Vivonex TEN via Gtube @ 20 mL/hr. This will provide 480 mL, 480 kcal, 18 g PRO, 98 g CHO, 1.4 g fat, 0 g fiber, 398 mL free water    3. Free water flushes 30 mL q4h for patency.    4. Vitamin labs: Vitamin A, E, K, B12 (Vitamin D already checked at OSH)    Future/Additional Recommendations:  1. If/when able to advance TFs > 20 mL/hr, recommend goal of Vivonex TEN @ 60 mL/hr (1440 mL/day) to provide 1440 kcal, 55 g PRO, 294 g CHO, 1195 mL free water, 4 g fat and 0 g fiber daily. Additionally order 1 pkt ProSource BID (80 kcal, 22 g PRO) for total 1520 kcal (35 kcal/kg) and 77 g PRO (1.8 g/kg).    2. If/when start PO diet, recommend home enzymes - Creon 24, 6 caps with meals and 2 with snacks.     3. Supplement vitamins accordingly pending above levels.      REASON FOR ASSESSMENT  Beverly Elena is a/an 42 year old female assessed by the dietitian for Pharmacy/Nutrition to Start and Manage PN and Provider Order - Registered Dietitian to Assess and Order TF per Medical Nutrition  "Therapy Protocol    NUTRITION HISTORY  - Information obtained from EMR (clinic notes and RD notes)                 - Patient with hx of gastroparesis, TPAIT. Pt has been seen by RD many times in the past.  Pt has hx of GJ tube in 2012 with initial TF trials of Peptamen 1.5 with prescribed goal of 40 ml/hr but noted ~2 days into therapy pt have c/o nausea, abd cramping, sweating with TF @ 20 ml/hr only. Pt was put on Impact Peptide post-TPAIT. Per past RD notes, pt reported that pt has also trialed several different enzymes with no improvement including Zenpep, Pertzye, Viokace as well as has been requiring PN therapy.  In 2015, pt was noted to be on Vivonex RTF formula    -Information obtained from pt:  -Pt has been having large amounts of steatorrhea despite taking PO Creon 24 (3 caps before and 3 caps after each meal - 6 total with meals; 2 caps with snacks).   -Pt has been tolerating mainly toast, sandwiches etc. Tried low fiber/low fat diet in the past for gastroparesis.   -Pt takes 2 AquaDEKS, vitamin D and a skin/hair/nails vitamin at home  -Pt has Boost at home but they have been too sweet lately.  -Pt has noticed large amount of wt loss (40#), muscle/subcutaneous fat loss, changes to skin and hair (see physical assessment below)    CURRENT NUTRITION ORDERS   Diet: NPO. Per MD, will remain NPO.     LABS  Labs reviewed  Phos 2.4  K+ 2.7    Vitamin D checked 2/20 @ OSH - total 10 (deficient)    MEDICATIONS  Medications reviewed  AquADEK  Neutraphos 2 pkt q6h  Vitamin D 8,000 units daily    ANTHROPOMETRICS  Height: 157.5 cm (5' 2\")  Most Recent Weight: 44 kg (97 lb)    IBW: 50 kg  BMI: Underweight BMI <18.5  Weight History: Loss of 23% in the past 8 months  Wt Readings from Last 10 Encounters:   03/11/18 44 kg (97 lb)   07/06/17 57 kg (125 lb 10.6 oz)   06/15/17 56.7 kg (125 lb)   06/15/17 54.6 kg (120 lb 6.4 oz)   02/19/16 56.7 kg (125 lb)   08/05/15 64.4 kg (141 lb 14.4 oz)   08/05/15 60.8 kg (134 lb) "   03/11/15 61.1 kg (134 lb 9.6 oz)   03/11/15 61 kg (134 lb 6.4 oz)   11/26/14 56.7 kg (125 lb)     Dosing Weight: 44 kg    ASSESSED NUTRITION NEEDS  Estimated Energy Needs: 1320 - 1540 kcals/day (30 - 35 kcals/kg)  Justification: Underweight  Estimated Protein Needs: 66 - 88 grams protein/day (1.5 - 2 grams of pro/kg)  Justification: Repletion, malabsorption  Estimated Fluid Needs: 1 mL/kcal  Justification: Maintenance    PHYSICAL FINDINGS  See malnutrition section below.  Dry/flaking skin on hands  No notable findings on nails.   Dull/dry hair (changed from baseline per pt)  Fragile/dry skin.   Pt states teeth have been falling out/loose.    MALNUTRITION  % Intake: </=50% for >/= 1 month (severe)  % Weight Loss: > 20% in 1 year (severe)  Subcutaneous Fat Loss: Facial region, Upper arm, Lower arm and Thoracic/intercostal: moderate  Muscle Loss: Thoracic region (clavicle, acromium bone, deltoid, trapezius, pectoral), Upper arm (bicep, tricep):, Lower arm  (forearm), Upper leg (quadricep, hamstring) and Posterior calf: severe  Fluid Accumulation/Edema: Mild (swelling in legs)  Malnutrition Diagnosis: Severe malnutrition in the context of chronic illness    NUTRITION DIAGNOSIS  Altered GI function related to severe fat malabsorption s/p TPAIT, gastroparesis as evidenced by ongoing steatorrhea, requiring now PN and elemental TF d/t malnourished state (23% wt loss in past year, poor PO intakes, muscle and fat loss)      INTERVENTIONS  Implementation  Nutrition Education: Discussed role of RD, nutrition plan (starting PN and TF, checking vitamin levels)   Collaboration with other providers - Discussed plan w/ transplant team who requested starting elemental TF @ 20 mL/hr + central PN + checking appropriate vitamin levels with fat malabsorption  Enteral Nutrition - Initiate Vivonex TEN @ 20 mL/hr  Feeding tube flush - 30 mL/hr patency flushes  Parenteral Nutrition/IV Fluids - Initiate custom compounded PN, sent change  order to pharmD    Goals  Pt to tolerate nutrition support (EN + PN) so that total avg nutritional intake to meet a minimum of 30 kcal/kg and 1.5 g PRO/kg daily (per dosing wt 44 kg).     Monitoring/Evaluation  Progress toward goals will be monitored and evaluated per protocol.     Beena Maria RD, LD, [facility]  Weekend pager: [phone]    7A RD Pager: [phone]

## 2018-03-11 NOTE — PLAN OF CARE
"Problem: Patient Care Overview  Goal: Plan of Care/Patient Progress Review  Outcome: No Change  /74 (BP Location: Left arm)  Pulse 114  Temp 97.7  F (36.5  C) (Oral)  Resp 16  Ht 1.575 m (5' 2\")  Wt 44 kg (97 lb)  SpO2 100%  BMI 17.74 kg/m2 VS: tachy but otherwise VSS on room air. Patient reported abdominal pain and received oxy x 1. Patient reported nausea and received zofran x 1. Urine Output - voiding with no issues. Bowel Function - last BM this morning and pt reported it is \"fatty\". Nutrition - NPO and plan to start TPN tonight after PICC placement. Drains - old gtube in place with plan to change to g/j tube tomorrow. Activity - up independently in room. PIV saline locked and will start fluids when pump available. All care explained and questions answered. Will continue to monitor and will notify team of changes.         "

## 2018-03-11 NOTE — IP AVS SNAPSHOT
MRN:[id number]                      After Visit Summary   3/11/2018    Beverly Elena    MRN: [id number]           Thank you!     Thank you for choosing [facility] for your care. Our goal is always to provide you with excellent care. Hearing back from our patients is one way we can continue to improve our services. Please take a few minutes to complete the written survey that you may receive in the mail after you visit with us. Thank you!        Patient Information     Date Of Birth          1975        Designated Caregiver       Most Recent Value    Caregiver    Will someone help with your care after discharge? yes    Name of designated caregiver Chau Elena, spouse    Phone number of caregiver [phone]    Caregiver address [place], [place]      About your hospital stay     You were admitted on:  March 11, 2018 You last received care in the:  [address] [facility]    You were discharged on:  March 18, 2018        Reason for your hospital stay       Malnutrition   Post-pancreatectomy diabetes  Failure to thrive   Malnutrition  Diabetic Ketoacidosis                  Who to Call     For medical emergencies, please call 911.  For non-urgent questions about your medical care, please call your primary care provider or clinic, [phone]  For questions related to your surgery, please call your surgery clinic        Attending Provider     Provider Specialty    Rajendra Cruz MD Transplant       Primary Care Provider Office Phone # Fax #    Marleny Aravapalli [phone]      After Care Instructions     Activity       Your activity upon discharge: activity as tolerated, no driving while on narcotics            Discharge Instructions       Nutrition labs Q week Monday.   Keep a food diary  Keep a stool diary  Recommend three times/week weights            Discharge Instructions       Resume prior to admission diet  Enteral nutrition: Peptamen 1.5 @ 40cc/hr + ReliZORB    If unable to  obtain RELiZORB:   (please copy and paste administration instructions into each order)  A) Sodium Bicarb tablet (325 mg), 1 tablet q 4 hrs via Jtube. Administration Instructions: Crush 1 tablet and mix into 15 ml of warm water and use this solution to mix with Creon pancreatic enzymes. DO NOT administer directly into Jtube (to be mixed into TF formula with Creon enzyme - see Creon enzyme order)   B) Creon 24, 1 capsules q 4 hrs via Jtube. Administration Instructions:   **Open capsule and empty contents into 15 ml sodium bicarb solution (see sodium bicarb order), let dissolve for about 20-30 minutes and then add this solution to the amount of TF formula hung in TF bag every 4 hrs (i.e., once TF @ goal infusion 40 ml/hr will mix 2 capsules into 160 ml of TF formula every 4 hrs).            Monitor and record       weight three times/ week.  Call weight recordings to coordinatorMargarita                  Follow-up Appointments     Adult [facility] Follow-up and recommended labs and tests       Recommend Labs Q week  Monday starting 3/26 to include Prealbumin, CBC, CMP    Appointments on [place] and/or [facility] (with [facility] or [facility] provider or service). Call [phone] if you haven't heard regarding these appointments within 7 days of discharge.                  Additional Services     Home infusion referral       Please fax discharge orders to [facility]:    Ph:  [phone]    Fax: [phone]    [facility] [phone]   [email or web address]  Will be supplying home feeding supplies for [facility] in patient's home area.    Enteral Feeds to be administered through GJT-via feeding pump-pt is independent.  Enteral feeding pump per home care agency routine.  Adult Kyle Pump.  Enteral Tube Feeding cares per home care agency routine.  Home enteral feeding bags per home are agency routine.    Anticipated Length of Therapy: to be  determined    Lab draws per MD team orders/plans of care.    Call/Fax Lab Results to: Outpatient Care Coordinator: Margaritajulian Gerard  Ph: [phone]  Ph: [phone]    3/16/18 Application for Relizorb was fax'd to the company to determine coverage--I gave a copy to Annabelle Snider for you--it is provided in cartridges to infuse with enteral formula  ---it is a new product  ---Dr Rajendra Cruz and Margarita Gerard will be ordering it    Patient's PICC LINE will be discontinued prior to discharge per MD orders/plans of care.  No home picc line supplies needed at time of discharge.                  Further instructions from your care team       ________________________________________________________  Discharge RN please fax discharge orders to home care agency: [facility]  --they need signed discharge orders by 12 noon on the day of discharge  ---page ciara Annabelle Tylor @ [phone] Monday-Friday  ---weekends call office [phone]  ________________________________________________________    Additional Information     If you use hormonal birth control (such as the pill, patch, ring or implants): You'll need a second form of birth control for 7 days (condoms, a diaphragm or contraceptive foam). While in the hospital, you received a medicine called Bridion. Your normal birth control will not work as well for a week after taking this medicine.          Pending Results     Date and Time Order Name Status Description    3/17/2018 2330 Prealbumin In process             Statement of Approval     Ordered          03/18/18 1137  I have reviewed and agree with all the recommendations and orders detailed in this document.  EFFECTIVE NOW     Approved and electronically signed by:  Cheryl Garrison NP             Admission Information     Date & Time Provider Department Dept. Phone    3/11/2018 Rajendra Cruz MD [address] [facility] [place] [phone]      Your Vitals Were     Blood Pressure Pulse Temperature Respirations Height Weight     "108/66 (BP Location: Right arm) 102 98.7  F (37.1  C) (Oral) 18 1.575 m (5' 2\") 49.9 kg (109 lb 14.4 oz)    Pulse Oximetry BMI (Body Mass Index)                100% 20.1 kg/m2          MyChart Information     [facility] gives you secure access to your electronic health record. If you see a primary care provider, you can also send messages to your care team and make appointments. If you have questions, please call your primary care clinic.  If you do not have a primary care provider, please call [phone] and they will assist you.        Care EveryWhere ID     This is your Care EveryWhere ID. This could be used by other organizations to access your [facility] medical records  [id number]        Equal Access to Services     MAIDA BALDWIN : Dayanna Selby, jennifer fitzgerald, juan ramon bentley, brunilda steinberg. So [facility] [phone].    ATENCIÓN: Si habla español, tiene a liu disposición servicios gratuitos de asistencia lingüística. Llame al [phone].    We comply with applicable federal civil rights laws and [place] laws. We do not discriminate on the basis of race, color, national origin, age, disability, sex, sexual orientation, or gender identity.               Review of your medicines      START taking        Dose / Directions    acetaminophen 32 mg/mL solution   Commonly known as:  TYLENOL   Used for:  Chronic abdominal pain        Dose:  650 mg   Take 20.3 mLs (650 mg) by mouth every 6 hours as needed for mild pain or fever   Quantity:  850 mL   Refills:  0       metroNIDAZOLE 50 mg/mL Susp   Commonly known as:  FLAGYL   Indication:  Clostridium difficile Bacteria, SBBO   Used for:  Small intestinal bacterial overgrowth        Dose:  250 mg   5 mLs (250 mg) by Oral or Feeding Tube route 3 times daily for 6 days   Quantity:  90 mL   Refills:  0       mirtazapine 15 MG tablet   Commonly known as:  REMERON   Used for:  Chronic abdominal pain, Absence of pancreas, " acquired total        Dose:  7.5 mg   Take 0.5 tablets (7.5 mg) by mouth At Bedtime   Quantity:  30 tablet   Refills:  1       multivitamin CF formula Liqd liquid   Used for:  Absence of pancreas, acquired total        Dose:  4 mL   Start taking on:  3/19/2018   4 mLs by Per Feeding Tube route daily   Quantity:  120 mL   Refills:  2       oxyCODONE 5 MG/5ML solution   Commonly known as:  ROXICODONE   Used for:  Chronic abdominal pain        Dose:  5-10 mg   Take 5-10 mLs (5-10 mg) by mouth every 4 hours as needed for moderate to severe pain   Quantity:  2100 mL   Refills:  0       polyethylene glycol Packet   Commonly known as:  MIRALAX/GLYCOLAX   Used for:  Constipation, unspecified constipation type        Dose:  8.5 g   Take 8.5 g by mouth 2 times daily   Quantity:  7 packet   Refills:  1       prochlorperazine 10 MG tablet   Commonly known as:  COMPAZINE   Used for:  Absence of pancreas, acquired total, Chronic nausea        Dose:  10 mg   Take 1 tablet (10 mg) by mouth every 6 hours as needed for vomiting   Quantity:  120 tablet   Refills:  0       sodium bicarbonate 325 MG tablet   Used for:  Absence of pancreas, acquired total        Dose:  325 mg   1 tablet (325 mg) by Per J Tube route every 4 hours as needed (if ReliZORB unavailable) If ReliZORB unavailable: Crush 1 tablet and mix into 15 ml of warm water and use this solution to mix with Creon pancreatic enzymes. DO NOT administer directly into Jtube (to be mixed into TF formula with Creon enzyme - see Creon enzyme order)   Quantity:  180 tablet   Refills:  1         CONTINUE these medicines which may have CHANGED, or have new prescriptions. If we are uncertain of the size of tablets/capsules you have at home, strength may be listed as something that might have changed.        Dose / Directions    * CREON 43851-14434 UNITS Cpep per EC capsule   This may have changed:    - additional instructions  - Another medication with the same name was removed.  Continue taking this medication, and follow the directions you see here.   Used for:  Pancreatic insufficiency   Generic drug:  amylase-lipase-protease        Open capsule and empty contents into 15 ml sodium bicarb solution (see sodium bicarb order), let dissolve for about 20-30 minutes and then add this solution to the amount of TF formula hung in TF bag every 4 hrs (i.e., once TF @ goal infusion 40 ml/hr will mix 2 capsules into 160 ml of TF formula every 4 hrs).   Quantity:  450 capsule   Refills:  0       * amylase-lipase-protease 97066-29039 UNITS Cpep per EC capsule   Commonly known as:  CREON 24   This may have changed:  You were already taking a medication with the same name, and this prescription was added. Make sure you understand how and when to take each.   Used for:  Absence of pancreas, acquired total   Replaces:  amylase-lipase-protease 32495 UNITS Tabs tablet        Dose:  1 capsule   Take 1 capsule (24,000 Units) by mouth every 4 hours as needed If ReliZORB unavailable: Open capsule and empty contents into 15 ml sodium bicarb solution (see sodium bicarb order), let dissolve for about 20-30 minutes and then add this solution to the amount of TF formula hung in TF bag every 4 hrs (i.e., once TF @ goal infusion 40 ml/hr will mix 2 capsules into 160 ml of TF formula every 4 hrs).   Quantity:  180 capsule   Refills:  1       * INSULIN PUMP - OUTPATIENT   This may have changed:  Another medication with the same name was added. Make sure you understand how and when to take each.        1 unit/hour   Refills:  0       * INSULIN BOLUS FROM INPATIENT AMBULATORY PUMP   This may have changed:  You were already taking a medication with the same name, and this prescription was added. Make sure you understand how and when to take each.        Insulin dose = 1 units for 20 grams of carbohydrate   Refills:  0       * INSULIN BOLUS FROM INPATIENT AMBULATORY PUMP   This may have changed:  You were already taking a  medication with the same name, and this prescription was added. Make sure you understand how and when to take each.        Inject Subcutaneous 3 times daily (before meals) Insulin dose = 1 units for 20 grams of carbohydrate   Refills:  0       * INSULIN BOLUS FROM INPATIENT AMBULATORY PUMP   This may have changed:  You were already taking a medication with the same name, and this prescription was added. Make sure you understand how and when to take each.        Inject Subcutaneous 4 times daily (before meals and nightly) Bolus-Correction 1 unit(s) to lower blood glucose by 75 mg/dL   Refills:  0       * Notice:  This list has 6 medication(s) that are the same as other medications prescribed for you. Read the directions carefully, and ask your doctor or other care provider to review them with you.      CONTINUE these medicines which have NOT CHANGED        Dose / Directions    alcohol swab prep pads   Used for:  Chronic pancreatitis (H)        Use to check blood glucose 6-8 times daily   Quantity:  1 each   Refills:  3       amitriptyline 100 MG tablet   Commonly known as:  ELAVIL   Used for:  Chronic pancreatitis (H), Acute post-operative pain, Chronic abdominal pain, Encounter for long-term opiate analgesic use        Dose:  100 mg   Take 1 tablet (100 mg) by mouth At Bedtime   Quantity:  30 tablet   Refills:  11       BD SHARPS CONTAINER HOME Misc        Use to dispose of used sharps   Quantity:  1 each   Refills:  0       blood glucose monitoring lancets        Dose:  1 each   1 each every 2 hours as needed.   Quantity:  3 each   Refills:  3       blood glucose monitoring test strip   Commonly known as:  no brand specified        Use 6-8 times daily to check blood glucose levels.  (Accu-chek Smartview test strips)   Quantity:  3 Month   Refills:  3       DULOXETINE HCL PO        Dose:  30 mg   Take 30 mg by mouth 2 times daily   Refills:  0       ergocalciferol 8000 UNIT/ML drops   Commonly known as:  DRISDOL    Used for:  Exocrine pancreatic insufficiency        Dose:  6000 Units   Take 0.75 mLs (6,000 Units) by mouth daily   Quantity:  30 mL   Refills:  11       esomeprazole 40 MG CR capsule   Commonly known as:  nexIUM   Used for:  Diarrhea        Dose:  40 mg   Take 1 capsule (40 mg) by mouth 2 times daily Take 30-60 minutes before eating.   Quantity:  90 capsule   Refills:  3       glucagon 1 MG kit   Commonly known as:  GLUCAGON EMERGENCY        Dose:  1 mg   Inject 1 mg into the muscle once for 1 dose   Quantity:  1 mg   Refills:  1       glucose 40 % Gel gel        Dose:  15 g   Take 15 g by mouth every hour as needed.   Quantity:  10 each   Refills:  1       MAXALT 10 MG tablet   Used for:  Sphincter of Oddi dysfunction, Pancreatic divisum, Recurrent acute pancreatitis   Generic drug:  rizatriptan        Dose:  10 mg   Take 10 mg by mouth at onset of headache   Refills:  0       ondansetron 8 MG ODT tab   Commonly known as:  ZOFRAN-ODT        Every 4-6 hours as needed   Refills:  0       PROBIOTIC DAILY PO        Dose:  2 tablet   Take 2 tablets by mouth daily   Refills:  0       REGLAN 10 MG tablet   Generic drug:  metoclopramide        Dose:  1 tablet   Take 1 tablet by mouth every 6 hours as needed   Refills:  0       VITAMIN D3 PO        Dose:  8000 Units   Take 8,000 Units by mouth daily   Refills:  0         STOP taking     adeks chewable tablet           amylase-lipase-protease 87814 UNITS Tabs tablet   Commonly known as:  VIOKACE   Replaced by:  amylase-lipase-protease 79537-45890 UNITS Cpep per EC capsule   You also have another medication with the same name that you need to continue taking as instructed.                Where to get your medicines      These medications were sent to [facility] - [place], [place] - [address]  [address] [address]     Phone:  [phone]     acetaminophen 32 mg/mL solution    metroNIDAZOLE 50 mg/mL Susp    mirtazapine 15 MG  tablet    multivitamin CF formula Liqd liquid    polyethylene glycol Packet    prochlorperazine 10 MG tablet         Some of these will need a paper prescription and others can be bought over the counter. Ask your nurse if you have questions.     Bring a paper prescription for each of these medications     oxyCODONE 5 MG/5ML solution    sodium bicarbonate 325 MG tablet                Protect others around you: Learn how to safely use, store and throw away your medicines at www.disposemymeds.org.        ANTIBIOTIC INSTRUCTION     You've Been Prescribed an Antibiotic - Now What?  Your healthcare team thinks that you or your loved one might have an infection. Some infections can be treated with antibiotics, which are powerful, life-saving drugs. Like all medications, antibiotics have side effects and should only be used when necessary. There are some important things you should know about your antibiotic treatment.      Your healthcare team may run tests before you start taking an antibiotic.    Your team may take samples (e.g., from your blood, urine or other areas) to run tests to look for bacteria. These test can be important to determine if you need an antibiotic at all and, if you do, which antibiotic will work best.      Within a few days, your healthcare team might change or even stop your antibiotic.    Your team may start you on an antibiotic while they are working to find out what is making you sick.    Your team might change your antibiotic because test results show that a different antibiotic would be better to treat your infection.    In some cases, once your team has more information, they learn that you do not need an antibiotic at all. They may find out that you don't have an infection, or that the antibiotic you're taking won't work against your infection. For example, an infection caused by a virus can't be treated with antibiotics. Staying on an antibiotic when you don't need it is more likely to be  harmful than helpful.      You may experience side effects from your antibiotic.    Like all medications, antibiotics have side effects. Some of these can be serious.    Let you healthcare team know if you have any known allergies when you are admitted to the hospital.    One significant side effect of nearly all antibiotics is the risk of severe and sometimes deadly diarrhea caused by Clostridium difficile (C. Difficile). This occurs when a person takes antibiotics because some good germs are destroyed. Antibiotic use allows C. diificile to take over, putting patients at high risk for this serious infection.    As a patient or caregiver, it is important to understand your or your loved one's antibiotic treatment. It is especially important for caregivers to speak up when patients can't speak for themselves. Here are some important questions to ask your healthcare team.    What infection is this antibiotic treating and how do you know I have that infection?    What side effects might occur from this antibiotic?    How long will I need to take this antibiotic?    Is it safe to take this antibiotic with other medications or supplements (e.g., vitamins) that I am taking?     Are there any special directions I need to know about taking this antibiotic? For example, should I take it with food?    How will I be monitored to know whether my infection is responding to the antibiotic?    What tests may help to make sure the right antibiotic is prescribed for me?      Information provided by:  www.cdc.gov/getsmart  U.S. Department of Health and Human Services  Centers for disease Control and Prevention  National Center for Emerging and Zoonotic Infectious Diseases  Division of Healthcare Quality Promotion        Information about OPIOIDS     PRESCRIPTION OPIOIDS: WHAT YOU NEED TO KNOW    Prescription opioids can be used to help relieve moderate to severe pain and are often prescribed following a surgery or injury, or for  certain health conditions. These medications can be an important part of treatment but also come with serious risks. It is important to work with your health care provider to make sure you are getting the safest, most effective care.    WHAT ARE THE RISKS AND SIDE EFFECTS OF OPIOID USE?  Prescription opioids carry serious risks of addiction and overdose, especially with prolonged use. An opioid overdose, often marked by slowed breathing can cause sudden death. The use of prescription opioids can have a number of side effects as well, even when taken as directed:      Tolerance - meaning you might need to take more of a medication for the same pain relief    Physical dependence - meaning you have symptoms of withdrawal when a medication is stopped    Increased sensitivity to pain    Constipation    Nausea, vomiting, and dry mouth    Sleepiness and dizziness    Confusion    Depression    Low levels of testosterone that can result in lower sex drive, energy, and strength    Itching and sweating    RISKS ARE GREATER WITH:    History of drug misuse, substance use disorder, or overdose    Mental health conditions (such as depression or anxiety)    Sleep apnea    Older age (65 years or older)    Pregnancy    Avoid alcohol while taking prescription opioids.   Also, unless specifically advised by your health care provider, medications to avoid include:    Benzodiazepines (such as Xanax or Valium)    Muscle relaxants (such as Soma or Flexeril)    Hypnotics (such as Ambien or Lunesta)    Other prescription opioids    KNOW YOUR OPTIONS:  Talk to your health care provider about ways to manage your pain that do not involve prescription opioids. Some of these options may actually work better and have fewer risks and side effects:    Pain relievers such as acetaminophen, ibuprofen, and naproxen    Some medications that are also used for depression or seizures    Physical therapy and exercise    Cognitive behavioral therapy, a  psychological, goal-directed approach, in which patients learn how to modify physical, behavioral, and emotional triggers of pain and stress    IF YOU ARE PRESCRIBED OPIOIDS FOR PAIN:    Never take opioids in greater amounts or more often than prescribed    Follow up with your primary health care provider and work together to create a plan on how to manage your pain.    Talk about ways to help manage your pain that do not involve prescription opioids    Talk about all concerns and side effects    Help prevent misuse and abuse    Never sell or share prescription opioids    Never use another person's prescription opioids    Store prescription opioids in a secure place and out of reach of others (this may include visitors, children, friends, and family)    Visit www.cdc.gov/drugoverdose to learn about risks of opioid abuse and overdose    If you believe you may be struggling with addiction, tell your health care provider and ask for guidance or call [facility]'s National Helpline at 7-339-503-HELP    LEARN MORE / www.cdc.gov/drugoverdose/prescribing/guideline.html    Safely dispose of unused prescription opioids: Find your local drug take-back programs and more information about the importance of safe disposal at www.doseofreality.mn.gov             Medication List: This is a list of all your medications and when to take them. Check marks below indicate your daily home schedule. Keep this list as a reference.      Medications           Morning Afternoon Evening Bedtime As Needed    acetaminophen 32 mg/mL solution   Commonly known as:  TYLENOL   Take 20.3 mLs (650 mg) by mouth every 6 hours as needed for mild pain or fever   Last time this was given:  650 mg on 3/13/2018 12:38 PM                                alcohol swab prep pads   Use to check blood glucose 6-8 times daily                                amitriptyline 100 MG tablet   Commonly known as:  ELAVIL   Take 1 tablet (100 mg) by mouth At Bedtime   Last time this  was given:  100 mg on 3/17/2018 10:08 PM                                BD SHARPS CONTAINER HOME Misc   Use to dispose of used sharps                                blood glucose monitoring lancets   1 each every 2 hours as needed.                                blood glucose monitoring test strip   Commonly known as:  no brand specified   Use 6-8 times daily to check blood glucose levels.  (Accu-chek Smartview test strips)                                * CREON 68867-29923 UNITS Cpep per EC capsule   Open capsule and empty contents into 15 ml sodium bicarb solution (see sodium bicarb order), let dissolve for about 20-30 minutes and then add this solution to the amount of TF formula hung in TF bag every 4 hrs (i.e., once TF @ goal infusion 40 ml/hr will mix 2 capsules into 160 ml of TF formula every 4 hrs).   Generic drug:  amylase-lipase-protease                                * amylase-lipase-protease 64881-75900 UNITS Cpep per EC capsule   Commonly known as:  CREON 24   Take 1 capsule (24,000 Units) by mouth every 4 hours as needed If ReliZORB unavailable: Open capsule and empty contents into 15 ml sodium bicarb solution (see sodium bicarb order), let dissolve for about 20-30 minutes and then add this solution to the amount of TF formula hung in TF bag every 4 hrs (i.e., once TF @ goal infusion 40 ml/hr will mix 2 capsules into 160 ml of TF formula every 4 hrs).                                DULOXETINE HCL PO   Take 30 mg by mouth 2 times daily   Last time this was given:  30 mg on 3/18/2018  8:34 AM                                ergocalciferol 8000 UNIT/ML drops   Commonly known as:  DRISDOL   Take 0.75 mLs (6,000 Units) by mouth daily                                esomeprazole 40 MG CR capsule   Commonly known as:  nexIUM   Take 1 capsule (40 mg) by mouth 2 times daily Take 30-60 minutes before eating.   Last time this was given:  40 mg on 3/11/2018  8:12 PM                                glucagon 1 MG kit    Commonly known as:  GLUCAGON EMERGENCY   Inject 1 mg into the muscle once for 1 dose                                glucose 40 % Gel gel   Take 15 g by mouth every hour as needed.                                * INSULIN PUMP - OUTPATIENT   1 unit/hour                                * INSULIN BOLUS FROM INPATIENT AMBULATORY PUMP   Insulin dose = 1 units for 20 grams of carbohydrate   Last time this was given:  3.2 each on 3/18/2018  1:00 PM                                * INSULIN BOLUS FROM INPATIENT AMBULATORY PUMP   Inject Subcutaneous 3 times daily (before meals) Insulin dose = 1 units for 20 grams of carbohydrate   Last time this was given:  3.2 each on 3/18/2018  1:00 PM                                * INSULIN BOLUS FROM INPATIENT AMBULATORY PUMP   Inject Subcutaneous 4 times daily (before meals and nightly) Bolus-Correction 1 unit(s) to lower blood glucose by 75 mg/dL   Last time this was given:  3.2 each on 3/18/2018  1:00 PM                                MAXALT 10 MG tablet   Take 10 mg by mouth at onset of headache   Generic drug:  rizatriptan                                metroNIDAZOLE 50 mg/mL Susp   Commonly known as:  FLAGYL   5 mLs (250 mg) by Oral or Feeding Tube route 3 times daily for 6 days   Last time this was given:  250 mg on 3/18/2018  2:32 PM                                mirtazapine 15 MG tablet   Commonly known as:  REMERON   Take 0.5 tablets (7.5 mg) by mouth At Bedtime                                multivitamin CF formula Liqd liquid   4 mLs by Per Feeding Tube route daily   Start taking on:  3/19/2018   Last time this was given:  4 mLs on 3/18/2018  8:32 AM                                ondansetron 8 MG ODT tab   Commonly known as:  ZOFRAN-ODT   Every 4-6 hours as needed   Last time this was given:  4 mg on 3/18/2018 12:43 PM                                oxyCODONE 5 MG/5ML solution   Commonly known as:  ROXICODONE   Take 5-10 mLs (5-10 mg) by mouth every 4 hours as needed for  moderate to severe pain   Last time this was given:  5 mg on 3/18/2018 12:44 PM                                polyethylene glycol Packet   Commonly known as:  MIRALAX/GLYCOLAX   Take 8.5 g by mouth 2 times daily   Last time this was given:  8.5 g on 3/16/2018  8:14 PM                                PROBIOTIC DAILY PO   Take 2 tablets by mouth daily                                prochlorperazine 10 MG tablet   Commonly known as:  COMPAZINE   Take 1 tablet (10 mg) by mouth every 6 hours as needed for vomiting   Last time this was given:  10 mg on 3/18/2018  8:34 AM                                REGLAN 10 MG tablet   Take 1 tablet by mouth every 6 hours as needed   Generic drug:  metoclopramide                                sodium bicarbonate 325 MG tablet   1 tablet (325 mg) by Per J Tube route every 4 hours as needed (if ReliZORB unavailable) If ReliZORB unavailable: Crush 1 tablet and mix into 15 ml of warm water and use this solution to mix with Creon pancreatic enzymes. DO NOT administer directly into Jtube (to be mixed into TF formula with Creon enzyme - see Creon enzyme order)                                VITAMIN D3 PO   Take 8,000 Units by mouth daily   Last time this was given:  8,000 Units on 3/18/2018  8:33 AM                                * Notice:  This list has 6 medication(s) that are the same as other medications prescribed for you. Read the directions carefully, and ask your doctor or other care provider to review them with you.

## 2018-03-11 NOTE — LETTER
Transition Communication Hand-off for Care Transitions to Next Level of Care Provider    Name: Beverly Elena  : 1975  MRN #: [id number]  Primary Care Provider: Marleny Hathaway     Primary Clinic: [facility] [address] [facility] [address]     Reason for Hospitalization:  Malnutrition   Post-pancreatectomy diabetes  Failure to thrive  S/P Pancreas transplant   Failure to thrive in adult  Failure to thrive  Admit Date/Time: 3/11/2018 12:35 PM  Discharge Date: 3.18.18  Payor Source: Payor: [employer] / Plan: PHCS / Product Type: Indemnity /       Reason for Communication Hand-off Referral: Other FTT after failed TPAIT    Discharge Plan:       Concern for non-adherence with plan of care:   Y/N Yes--does what she wants to regarding enzymes/glucose levels  Discharge Needs Assessment:  Needs       Most Recent Value    Home Infusion Provider [facility] [phone], Fax: [phone]            Follow-up specialty is recommended: Yes    Follow-up plan:  No future appointments. To keep a food journal and call DR Cruz Q week    Any outstanding tests or procedures:        Referrals     Future Labs/Procedures    Home infusion referral     Comments:    Please fax discharge orders to [facility]:    Ph:  [phone]    Fax: [phone]    [facility] [phone]   [email or web address]  Will be supplying home feeding supplies for [facility] in patient's home area.    Enteral Feeds to be administered through [facility]-via feeding pump-pt is independent.  Enteral feeding pump per home care agency routine.  Adult Kyle Pump.  Enteral Tube Feeding cares per home care agency routine.  Home enteral feeding bags per home are agency routine.    Anticipated Length of Therapy: to be determined    Lab draws per MD team orders/plans of care.    Call/Fax Lab Results to: Outpatient Care Coordinator: Margarita  Moustapha  Ph: [phone]  Ph: [phone]    3/16/18 Application for Relizorb was fax'd to the company to determine coverage--I gave a copy to Annabelle Snider for you--it is provided in cartridges to infuse with enteral formula  ---it is a new product  ---Dr Rajendra Cruz and Margarita Gerard will be ordering it    Patient's PICC LINE will be discontinued prior to discharge per MD orders/plans of care.  No home picc line supplies needed at time of discharge.            Key Recommendations:      Marlin Clay    AVS/Discharge Summary is the source of truth; this is a helpful guide for improved communication of patient story

## 2018-03-11 NOTE — IP AVS SNAPSHOT
"    [address] [facility]: [phone]                                              INTERAGENCY TRANSFER FORM - PHYSICIAN ORDERS   3/11/2018                    Hospital Admission Date: 3/11/2018  CONCETTA EID   : [id number]  Sex: Female        Attending Provider: Rajendra Cruz MD     Allergies:  Penicillin G, Penicillins, Corticosteroids, Ertapenem, Merrem [Meropenem], Other [Seasonal Allergies], Vancomycin, Vancomycin    Infection:  None   Service:  TRANSPLANT    Ht:  1.575 m (5' 2\")   Wt:  49.9 kg (109 lb 14.4 oz)   Admission Wt:  44 kg (97 lb)    BMI:  20.1 kg/m 2   BSA:  1.48 m 2            Patient PCP Information     Provider PCP Type    [facility]      ED Clinical Impression     Diagnosis Description Comment Added By Time Added    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, 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, Z90.410] 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, 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, Z90.410]  Rajendra Cruz MD 3/11/2018  3:22 PM    Hypokalemia [E87.6] Hypokalemia [E87.6]  Rajendra Cruz MD 3/11/2018  3:25 PM    Hypophosphatemia [E83.39] Hypophosphatemia [E83.39]  Rajendra Cruz MD 3/11/2018  3:26 PM    Gastroesophageal reflux disease with esophagitis [K21.0] Gastroesophageal reflux disease with esophagitis [K21.0]  Rajendra Cruz MD 3/12/2018  2:48 AM    Chronic abdominal pain [R10.9, G89.29] Chronic abdominal pain [R10.9, G89.29]  Marlin Clay, FRANK 3/15/2018  8:18 AM    Absence of pancreas, acquired total [Z90.410] Absence of pancreas, acquired total [Z90.410]  Marlin Clay, RN 3/15/2018  8:18 AM    Diarrhea, unspecified type [R19.7] Diarrhea, unspecified type [R19.7]  Marlin Clay, RN 3/15/2018  8:18 AM    Failure to thrive in adult [R62.7] Failure to thrive in adult [R62.7]  Marlin Clay, RN 3/15/2018  8:18 AM    On enteral nutrition [Z78.9] On enteral nutrition [Z78.9]  Marlin Clay RN 3/15/2018  8:18 AM    PICC (peripherally inserted central catheter) in place [Z45.2] PICC (peripherally " inserted central catheter) in place [Z45.2]  Marlin Clay RN 3/15/2018  8:18 AM    On total parenteral nutrition [Z78.9] On total parenteral nutrition [Z78.9]  Marlin Clay RN 3/15/2018  8:18 AM    Small intestinal bacterial overgrowth [K63.89] Small intestinal bacterial overgrowth [K63.89]  Rajendra Cruz MD 3/17/2018 12:20 PM    Chronic nausea [R11.0] Chronic nausea [R11.0]  Cheryl Garrison NP 3/18/2018 10:28 AM    Pancreatic insufficiency [K86.89] Pancreatic insufficiency [K86.89]  Cheryl Garrison NP 3/18/2018 10:29 AM    Constipation, unspecified constipation type [K59.00] Constipation, unspecified constipation type [K59.00]  Cheryl Garrison NP 3/18/2018 10:37 AM      Hospital Problems as of 3/18/2018              Priority Class Noted POA    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) Medium  6/28/2013 Yes    Failure to thrive in adult Medium  3/11/2018 Yes      Non-Hospital Problems as of 3/18/2018              Priority Class Noted    Endometriosis Low  9/9/2011    Gastroparesis Medium  7/23/2012    Encounter for long-term opiate analgesic use Medium  6/27/2013    Chronic abdominal pain Medium  6/27/2013    Absence of pancreas, acquired total Medium  6/28/2013    Exocrine pancreatic insufficiency Medium  6/28/2013    Acute post-operative pain Medium  7/1/2013    Abdominal pain Medium  7/1/2013    Anemia due to blood loss, acute Medium  7/1/2013    Iron deficiency anemia Medium  7/12/2013    Diarrhea Medium  4/10/2014    Esophageal reflux Medium  4/10/2014    Asplenia Medium  10/24/2014    DKA (diabetic ketoacidoses) (H) Medium  6/15/2017      Code Status History     Date Active Date Inactive Code Status Order ID Comments User Context    3/18/2018 10:50 AM  Full Code [id number]  Cheryl Garrison NP Outpatient    6/18/2017  9:42 AM 3/18/2018 10:50 AM Full Code [id number]  Benedict Davenport MD Outpatient    6/15/2017  6:12 PM 6/18/2017  9:42 AM Full Code [id number]  Smith Marsh  MD Chan Inpatient    6/28/2013 11:08 PM 7/12/2013  3:56 PM Full Code [id number]  Micheal Ruano MD Inpatient    8/2/2012  1:16 PM 8/5/2012  2:26 PM Full Code [id number]  Lauri Esqueda MD Inpatient    1/11/2012  8:19 PM 1/12/2012  7:39 PM Full Code [id number]  Maddi Mix RN Inpatient    12/15/2011 12:13 PM 12/18/2011  6:28 PM Full Code [id number]  Marianela Young MD Inpatient    9/10/2011 12:36 PM 12/15/2011 12:13 PM Full Code [id number]  Marianela Young MD Outpatient         Medication Review      START taking        Dose / Directions Comments    acetaminophen 32 mg/mL solution   Commonly known as:  TYLENOL   Used for:  Chronic abdominal pain        Dose:  650 mg   Take 20.3 mLs (650 mg) by mouth every 6 hours as needed for mild pain or fever   Quantity:  850 mL   Refills:  0        metroNIDAZOLE 50 mg/mL Susp   Commonly known as:  FLAGYL   Indication:  Clostridium difficile Bacteria, SBBO   Used for:  Small intestinal bacterial overgrowth        Dose:  250 mg   5 mLs (250 mg) by Oral or Feeding Tube route 3 times daily for 6 days   Quantity:  90 mL   Refills:  0        mirtazapine 15 MG tablet   Commonly known as:  REMERON   Used for:  Chronic abdominal pain, Absence of pancreas, acquired total        Dose:  7.5 mg   Take 0.5 tablets (7.5 mg) by mouth At Bedtime   Quantity:  30 tablet   Refills:  1        multivitamin CF formula Liqd liquid   Used for:  Absence of pancreas, acquired total        Dose:  4 mL   Start taking on:  3/19/2018   4 mLs by Per Feeding Tube route daily   Quantity:  120 mL   Refills:  2        oxyCODONE 5 MG/5ML solution   Commonly known as:  ROXICODONE   Used for:  Chronic abdominal pain        Dose:  5-10 mg   Take 5-10 mLs (5-10 mg) by mouth every 4 hours as needed for moderate to severe pain   Quantity:  2100 mL   Refills:  0        polyethylene glycol Packet   Commonly known as:  MIRALAX/GLYCOLAX   Used for:  Constipation, unspecified constipation type         Dose:  8.5 g   Take 8.5 g by mouth 2 times daily   Quantity:  7 packet   Refills:  1        prochlorperazine 10 MG tablet   Commonly known as:  COMPAZINE   Used for:  Absence of pancreas, acquired total, Chronic nausea        Dose:  10 mg   Take 1 tablet (10 mg) by mouth every 6 hours as needed for vomiting   Quantity:  120 tablet   Refills:  0        sodium bicarbonate 325 MG tablet   Used for:  Absence of pancreas, acquired total        Dose:  325 mg   1 tablet (325 mg) by Per J Tube route every 4 hours as needed (if ReliZORB unavailable) If ReliZORB unavailable: Crush 1 tablet and mix into 15 ml of warm water and use this solution to mix with Creon pancreatic enzymes. DO NOT administer directly into Jtube (to be mixed into TF formula with Creon enzyme - see Creon enzyme order)   Quantity:  180 tablet   Refills:  1          CONTINUE these medications which may have CHANGED, or have new prescriptions. If we are uncertain of the size of tablets/capsules you have at home, strength may be listed as something that might have changed.        Dose / Directions Comments    * CREON 77262-96273 UNITS Cpep per EC capsule   This may have changed:    - additional instructions  - Another medication with the same name was removed. Continue taking this medication, and follow the directions you see here.   Used for:  Pancreatic insufficiency   Generic drug:  amylase-lipase-protease        Open capsule and empty contents into 15 ml sodium bicarb solution (see sodium bicarb order), let dissolve for about 20-30 minutes and then add this solution to the amount of TF formula hung in TF bag every 4 hrs (i.e., once TF @ goal infusion 40 ml/hr will mix 2 capsules into 160 ml of TF formula every 4 hrs).   Quantity:  450 capsule   Refills:  0        * amylase-lipase-protease 62047-22244 UNITS Cpep per EC capsule   Commonly known as:  CREON 24   This may have changed:  You were already taking a medication with the same name, and this  prescription was added. Make sure you understand how and when to take each.   Used for:  Absence of pancreas, acquired total   Replaces:  amylase-lipase-protease 98499 UNITS Tabs tablet        Dose:  1 capsule   Take 1 capsule (24,000 Units) by mouth every 4 hours as needed If ReliZORB unavailable: Open capsule and empty contents into 15 ml sodium bicarb solution (see sodium bicarb order), let dissolve for about 20-30 minutes and then add this solution to the amount of TF formula hung in TF bag every 4 hrs (i.e., once TF @ goal infusion 40 ml/hr will mix 2 capsules into 160 ml of TF formula every 4 hrs).   Quantity:  180 capsule   Refills:  1        * INSULIN PUMP - OUTPATIENT   This may have changed:  Another medication with the same name was added. Make sure you understand how and when to take each.        1 unit/hour   Refills:  0        * INSULIN BOLUS FROM INPATIENT AMBULATORY PUMP   This may have changed:  You were already taking a medication with the same name, and this prescription was added. Make sure you understand how and when to take each.        Insulin dose = 1 units for 20 grams of carbohydrate   Refills:  0        * INSULIN BOLUS FROM INPATIENT AMBULATORY PUMP   This may have changed:  You were already taking a medication with the same name, and this prescription was added. Make sure you understand how and when to take each.        Inject Subcutaneous 3 times daily (before meals) Insulin dose = 1 units for 20 grams of carbohydrate   Refills:  0        * INSULIN BOLUS FROM INPATIENT AMBULATORY PUMP   This may have changed:  You were already taking a medication with the same name, and this prescription was added. Make sure you understand how and when to take each.        Inject Subcutaneous 4 times daily (before meals and nightly) Bolus-Correction 1 unit(s) to lower blood glucose by 75 mg/dL   Refills:  0        * Notice:  This list has 6 medication(s) that are the same as other medications prescribed  for you. Read the directions carefully, and ask your doctor or other care provider to review them with you.      CONTINUE these medications which have NOT CHANGED        Dose / Directions Comments    alcohol swab prep pads   Used for:  Chronic pancreatitis (H)        Use to check blood glucose 6-8 times daily   Quantity:  1 each   Refills:  3        amitriptyline 100 MG tablet   Commonly known as:  ELAVIL   Used for:  Chronic pancreatitis (H), Acute post-operative pain, Chronic abdominal pain, Encounter for long-term opiate analgesic use        Dose:  100 mg   Take 1 tablet (100 mg) by mouth At Bedtime   Quantity:  30 tablet   Refills:  11        BD SHARPS CONTAINER HOME Misc        Use to dispose of used sharps   Quantity:  1 each   Refills:  0        blood glucose monitoring lancets        Dose:  1 each   1 each every 2 hours as needed.   Quantity:  3 each   Refills:  3    Pt is testing up to 12 times a day       blood glucose monitoring test strip   Commonly known as:  no brand specified        Use 6-8 times daily to check blood glucose levels.  (Accu-chek Smartview test strips)   Quantity:  3 Month   Refills:  3        DULOXETINE HCL PO        Dose:  30 mg   Take 30 mg by mouth 2 times daily   Refills:  0        ergocalciferol 8000 UNIT/ML drops   Commonly known as:  DRISDOL   Used for:  Exocrine pancreatic insufficiency        Dose:  6000 Units   Take 0.75 mLs (6,000 Units) by mouth daily   Quantity:  30 mL   Refills:  11    Needs drop formulation due to malabsoroption issues.       esomeprazole 40 MG CR capsule   Commonly known as:  nexIUM   Used for:  Diarrhea        Dose:  40 mg   Take 1 capsule (40 mg) by mouth 2 times daily Take 30-60 minutes before eating.   Quantity:  90 capsule   Refills:  3        glucagon 1 MG kit   Commonly known as:  GLUCAGON EMERGENCY        Dose:  1 mg   Inject 1 mg into the muscle once for 1 dose   Quantity:  1 mg   Refills:  1        glucose 40 % Gel gel        Dose:  15 g    Take 15 g by mouth every hour as needed.   Quantity:  10 each   Refills:  1        MAXALT 10 MG tablet   Used for:  Sphincter of Oddi dysfunction, Pancreatic divisum, Recurrent acute pancreatitis   Generic drug:  rizatriptan        Dose:  10 mg   Take 10 mg by mouth at onset of headache   Refills:  0        ondansetron 8 MG ODT tab   Commonly known as:  ZOFRAN-ODT        Every 4-6 hours as needed   Refills:  0        PROBIOTIC DAILY PO        Dose:  2 tablet   Take 2 tablets by mouth daily   Refills:  0        REGLAN 10 MG tablet   Generic drug:  metoclopramide        Dose:  1 tablet   Take 1 tablet by mouth every 6 hours as needed   Refills:  0        VITAMIN D3 PO        Dose:  8000 Units   Take 8,000 Units by mouth daily   Refills:  0          STOP taking     adeks chewable tablet           amylase-lipase-protease 10660 UNITS Tabs tablet   Commonly known as:  VIOKACE   Replaced by:  amylase-lipase-protease 82645-30374 UNITS Cpep per EC capsule   You also have another medication with the same name that you need to continue taking as instructed.                     Further instructions from your care team       ________________________________________________________  Discharge RN please fax discharge orders to home care agency: [facility]  --they need signed discharge orders by 12 noon on the day of discharge  ---page ciara Snider @ [phone] Monday-Friday  ---weekends call office [phone]  ________________________________________________________    Summary of Visit     Reason for your hospital stay       Malnutrition   Post-pancreatectomy diabetes  Failure to thrive   Malnutrition  Diabetic Ketoacidosis             After Care     Activity       Your activity upon discharge: activity as tolerated, no driving while on narcotics       Discharge Instructions       Nutrition labs Q week Monday.   Keep a food diary  Keep a stool diary  Recommend three times/week weights       Discharge Instructions        Resume prior to admission diet  Enteral nutrition: Peptamen 1.5 @ 40cc/hr + ReliZORB    If unable to obtain RELiZORB:   (please copy and paste administration instructions into each order)  A) Sodium Bicarb tablet (325 mg), 1 tablet q 4 hrs via Jtube. Administration Instructions: Crush 1 tablet and mix into 15 ml of warm water and use this solution to mix with Creon pancreatic enzymes. DO NOT administer directly into Jtube (to be mixed into TF formula with Creon enzyme - see Creon enzyme order)   B) Creon 24, 1 capsules q 4 hrs via Jtube. Administration Instructions:   **Open capsule and empty contents into 15 ml sodium bicarb solution (see sodium bicarb order), let dissolve for about 20-30 minutes and then add this solution to the amount of TF formula hung in TF bag every 4 hrs (i.e., once TF @ goal infusion 40 ml/hr will mix 2 capsules into 160 ml of TF formula every 4 hrs).       Monitor and record       weight three times/ week.  Call weight recordings to coordinator, Margarita Gerard             Referrals     Home infusion referral       Your provider has referred you to: [facility]: [facility] [place] [phone]   [email or web address]  Ph: [phone]  Enteral Feeds to be administered through [facility]-via feeding pump-pt is independent      Local Address (if different from home address): N/A    Anticipated Length of Therapy: to be determined    Home Infusion Pharmacist to adjust therapy based on labs and clinical assessments: Yes    Labs:Weekly CBC, CMP, prealbumin  May draw labs from Venous Catheter: Yes  PICC line care per home care policy    Home Infusion Pharmacist to order labs based on therapy type and clinical assessments: Yes  Call/Fax Lab Results to: Outpatient Care Coordinator: Margarita Gerard  Ph: [phone]  Ph: [phone]    Agency Staff to assess nursing needs for Infusion Therapy.    Access Device Management:  IV Access Type: PICC  Flush with Heparin and  Normal Saline IVP PRN and routine site care (per agency protocol) to maintain access device? Yes      3/16/18 Application for Relizorb was fax'd to the company to determine coverage--I gave a copy to Annabelle Snider for you--it is provided in cartridges to infuse with enteral formula  ---it is a new product  ---Dr Rajendra Cruz and Margarita Gerard will be ordering it             Follow-Up Appointment Instructions     Future Labs/Procedures    [facility] Follow-up and recommended labs and tests     Comments:    Recommend Labs Q week  Monday starting 3/26 to include Prealbumin, CBC, CMP    Appointments on [place] and/or [facility] (with [facility] or [facility] provider or service). Call [phone] if you haven't heard regarding these appointments within 7 days of discharge.      Follow-Up Appointment Instructions     [facility] Follow-up and recommended labs and tests       Recommend Labs Q week  Monday starting 3/26 to include Prealbumin, CBC, CMP    Appointments on [place] and/or [facility] (with [facility] or [facility] provider or service). Call [phone] if you haven't heard regarding these appointments within 7 days of discharge.             Statement of Approval     Ordered          03/18/18 1137  I have reviewed and agree with all the recommendations and orders detailed in this document.  EFFECTIVE NOW     Approved and electronically signed by:  Cheryl Garrison NP

## 2018-03-11 NOTE — H&P
TRANSPLANT SURGERY HISTORY & PHYSICAL    Patient: Beverly Elena   MRN: [id number]     Date of Admission: 3/11/2018  Attending Physician: Dr. 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 She has a very complex medical and surgical history and is status post TPAIP in 2013 that did not work, currently on insulin pump. She reports multiple bowel movement per day, in various quantities and consistency, and but oily most of the time. She takes Creon with meals but continues to have diarrhea, weight loss, and malabsorption. She has tried many antidiarrhea regimen that has not help.  Her diarrhea has worsening over the years to a point where she is having a rectal prolapse and pain with bowel movement. She is however able to reduce the rectum when it prolapses.  She also has gastroparesis and is status post gastric pacemaker in 2017 but continues to have persistent nausea requiring anti-emetics with occasional vomiting. She also reported chronic generalized abdominal pain on a daily basis as well as with tube feeding in the past. She denied any recent illness or fevers, denies chest pain, shortness of breath, abdominal pain or urinary symptoms. She however was very nauseous and requested Zofran.    PMH:  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) 6/28/2013     Endometriosis      Endometriosis 9/9/2011     Exocrine pancreatic insufficiency 6/28/2013     Gastro-oesophageal reflux disease      Gastroparesis      Gastroparesis 7/23/2012     Iron deficiency anemia  7/12/2013     Other chronic pain      Pancreatic disease     history of pancreatitis     Pancreatic divisum      Pancreatic divisum 10/1/2012     Pneumonia, organism 7/17/2013     PONV (postoperative nausea and vomiting)     Nausea     Portal vein thrombosis 6/29/2013     Recurrent acute pancreatitis 1/17/2013     Sphincter of Oddi dysfunction 1/17/2013     Syncope     X 2     Type 2 diabetes mellitus without complications (H)        PSH:  Past Surgical History:   Procedure Laterality Date     APPENDECTOMY       CHOLECYSTECTOMY       COLONOSCOPY  5/21/2014    Procedure: COMBINED COLONOSCOPY, SINGLE BIOPSY/POLYPECTOMY BY BIOPSY;  Surgeon: Hugo Lind MD;  [facility]     ENDOSCOPIC INSERTION TUBE GASTROSTOMY  2/5/2014    Procedure: ENDOSCOPIC INSERTION TUBE GASTROSTOMY;  Endoscopic percutaneous gastro-jejunostomy tube placement (removal of previous gastrostomy tube)  ;  Surgeon: Sharon Oh MD;  [facility]     ENDOSCOPIC RETROGRADE CHOLANGIOPANCREATOGRAM  9/9/2011    Procedure:ENDOSCOPIC RETROGRADE CHOLANGIOPANCREATOGRAM; Endoscopic Retrograde Cholangiopancreatogram with C Arm, Biliary Sphincterotomy, insertion Bile Duct Stent X 1; Surgeon:JABIER HEATH; [facility]     ENDOSCOPIC RETROGRADE CHOLANGIOPANCREATOGRAM  12/15/2011    Procedure:ENDOSCOPIC RETROGRADE CHOLANGIOPANCREATOGRAM; Endoscopic Retrograde Cholangiopancreatogram (c-arm), with pancreatic stent placement, sphincterotomy; Surgeon:JABIER HEATH; Location: OR     ENDOSCOPIC RETROGRADE CHOLANGIOPANCREATOGRAM  6/14/2012    Procedure: ENDOSCOPIC RETROGRADE CHOLANGIOPANCREATOGRAM;  endoscopic retrograde cholangiopancreatogram with pancreatic stent placement  (c-arm);  Surgeon: Jabier Heath MD;  Location:  OR     ESOPHAGOSCOPY, GASTROSCOPY, DUODENOSCOPY (EGD), COMBINED  11/9/2011    Procedure:COMBINED ESOPHAGOSCOPY, GASTROSCOPY, DUODENOSCOPY (EGD); Surgeon:TANIKA MOHAN; Location: GI     ESOPHAGOSCOPY,  GASTROSCOPY, DUODENOSCOPY (EGD), COMBINED  5/21/2014    Procedure: COMBINED ESOPHAGOSCOPY, GASTROSCOPY, DUODENOSCOPY (EGD), BIOPSY SINGLE OR MULTIPLE;  Surgeon: Hugo Lind MD;  [facility]     HC CHANGE GASTROSTOMY TUBE PERC, WO IMAGING OR ENDO GUIDE N/A 11/25/2014    Procedure: CHANGE GASTROSTOMY TUBE WITHOUT SCOPE;  Surgeon: Sharon Oh MD;  [facility]     HC IMPLANT PERIPH/GASTRIC NEUROSTIM/  1/11/2012    gastric pacemaker     HC REPLACE DUODENOSTOMY/JEJUNOSTOMY TUBE PERCUTANEOUS  2/12/2014    Procedure: REPLACE JEJUNOSTOMY TUBE, PERCUTANEOUS;  Surgeon: Sharon Oh MD;  [facility]     HC UGI ENDOSCOPY W EUS  9/7/2011    Procedure:COMBINED ENDOSCOPIC ULTRASOUND, ESOPHAGOSCOPY, GASTROSCOPY, DUODENOSCOPY (EGD); Surgeon:TREE DEAN; [facility]     HC UGI ENDOSCOPY W EUS  12/4/2012    Procedure: COMBINED ENDOSCOPIC ULTRASOUND, ESOPHAGOSCOPY, GASTROSCOPY, DUODENOSCOPY (EGD);  Surgeon: Sharon Oh MD;  [facility]     HC UGI ENDOSCOPY W EUS  5/8/2013    Procedure: COMBINED ENDOSCOPIC ULTRASOUND, ESOPHAGOSCOPY, GASTROSCOPY, DUODENOSCOPY (EGD);  Surgeon: Tree Dean MD;  [facility]     HC UGI ENDOSCOPY W PLACEMENT GASTROSTOMY TUBE PERCUT N/A 2/19/2016    Procedure: COMBINED ESOPHAGOSCOPY, GASTROSCOPY, DUODENOSCOPY (EGD), PLACE PERCUTANEOUS ENDOSCOPIC GASTROSTOMY TUBE;  Surgeon: Sharon Oh MD;  [facility]     LAPAROSCOPIC IMPLANT GASTRIC STIMULATOR  1/11/2012    Procedure:LAPAROSCOPIC IMPLANT GASTRIC STIMULATOR; LAPAROSCOPIC IMPLANT GASTRIC STIMULATOR ; Surgeon:CHONG HART; [facility]     LAPAROSCOPIC SALPINGO-OOPHORECTOMY       LAPAROSCOPIC SALPINGOTOMY      R     LAPAROTOMY EXPLORATORY  6/29/2013    Procedure: LAPAROTOMY EXPLORATORY;  Exploratory laparotomy, portal vein declotting with tissue plasminogen activator administration, thrombectomy and embolectomy of portal vein, intraoperative trans-sonic flow monitoring,  intraoperative ultrasound;  Surgeon: Rajendra Cruz MD;  [facility]     neurostimulator[  2011     PANCREATECTOMY, TRANSPLANT AUTO ISLET CELL, COMBINED  2013    Procedure: COMBINED PANCREATECTOMY, TRANSPLANT AUTO ISLET CELL;  Total Pancreatectomy, Auto Islet Cell Transplant             Anesthesia General with block;  Surgeon: Rajendra Cruz MD;  [facility]     PICC INSERTION Right 2017    5fr TL Bard PICC, 37cm (3cm external) in the R basilic vein w/ tip in the low SVC     TUBAL LIGATION         FH:  Family History   Problem Relation Age of Onset     C.A.D. Father      GI: V, D, anorexia     Blood Disease Father      lymphoma -  from pulmonary fibrosis.     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    Allergies:  Allergies   Allergen Reactions     Penicillin G Anaphylaxis     Penicillins Anaphylaxis     2013 - pt tolerated ertapenem     Corticosteroids Other (See Comments)     All oral,IV and injectable steroids are contraindicated (unless in life threatening situations) in Islet Auto transplant recipients. They can cause irreversible loss of islet cell function. Please contact patients transplant care coordinator LAUREN Gerard RN at /pager   and Endocrinologist prior to administration.     Ertapenem Other (See Comments)     Elevated LFT's  Elevated LFT's     Merrem [Meropenem] Other (See Comments)     Elevated LFTs >3x, happened with invanz too  Elevated LFT's     Other [Seasonal Allergies]      Orange causes rash, NV     Vancomycin Other (See Comments)     osiel syndrome      "Vancomycin Other (See Comments)     Gets \"red lizandro syndrome\"     Orange Oil Nausea and Vomiting and Rash       Home Meds:  No current outpatient prescriptions on file.        ROS:   A 10-point review of systems was performed and otherwise negative except as reported in HPI.    Physical Exam:  Temp:  [97.7  F (36.5  C)-98.7  F (37.1  C)] 98.7  F (37.1  C)  Pulse:  [102-114] 102  Resp:  [16] 16  BP: (112-115)/(69-74) 112/69  SpO2:  [98 %-100 %] 98 %    General: AAOx4, NAD, sitting up on bed comfortably  CV: regular rate, warm, well-perfused  Pulm: no dyspnea, breathing comfortably on RA  Abd: soft, non-distended, non-tender with laparotomy scar, a subq implant gastric stimulator and insulin pump  Extremities: no edema  Skin: no rashes  Neuro: moving all extremities spontaneously without apparent deficit    Labs:  ABG No lab results found in last 7 days.  CBC     Recent Labs  Lab 03/11/18  1425   WBC 6.8   HGB 12.7        BMP     Recent Labs  Lab 03/11/18  1425      POTASSIUM 2.7*   CHLORIDE 102   CO2 15*   BUN 6*   CR 0.46*   *     LFTs     Recent Labs  Lab 03/11/18  1425   INR 1.20*     Pancreas No lab results found in last 7 days.    Imaging:  XR Abdomen 2 Views    (Results Pending)       ASSESSMENT/PLAN:  Beverly Elena is a 42 year old female with complex past medical and surgical hx, who is status post TP AIT in 2013 that did not work and now presents presents with failure to thrive, weight loss, malabsorption and constant diarrhea.      Admit to transplant service  Place 2L PICC asap  Start TF + D5NS, Insulin gtt, TPN tomorrow  Replete electrolytes  start  tube feeds @20 cc/hr via G (elemental feed), NPO x meds and TF  Restart home medications   GI consult for conversion of G to GJ tube placement tomorrow  AXR 2 views to assess for obstruction/stool burden  Nutrition consult    Patient has history assessment and plan discussed with transplant fellow Dr. Amelia Larsen MD " (PGY1)  General Surgery    Physician Attestation   I, Rajendra Cruz, personally examined and evaluated this patient.  I discussed the patient with the resident and care team, and agree with the assessment and plan of care as documented in the resident s note of 3/11/18 [date].      I personally reviewed vital signs, medications and labs.    Key findings: uncontrolled diabetes, uncontrolled steatthorea, severe protein-calorie malnutrition  Rajendra Cruz  Date of Service (when I saw the patient): 03/11/18

## 2018-03-11 NOTE — IP AVS SNAPSHOT
[address] [address]    Phone:  [phone]                                       After Visit Summary   3/11/2018    Beverly Elena    MRN: [id number]           After Visit Summary Signature Page     I have received my discharge instructions, and my questions have been answered. I have discussed any challenges I see with this plan with the nurse or doctor.    ..........................................................................................................................................  Patient/Patient Representative Signature      ..........................................................................................................................................  Patient Representative Print Name and Relationship to Patient    ..................................................               ................................................  Date                                            Time    ..........................................................................................................................................  Reviewed by Signature/Title    ...................................................              ..............................................  Date                                                            Time

## 2018-03-12 ENCOUNTER — ANESTHESIA EVENT (OUTPATIENT)
Dept: SURGERY | Facility: CLINIC | Age: 43
DRG: 391 | End: 2018-03-12
Payer: COMMERCIAL

## 2018-03-12 ENCOUNTER — APPOINTMENT (OUTPATIENT)
Dept: GENERAL RADIOLOGY | Facility: CLINIC | Age: 43
DRG: 391 | End: 2018-03-12
Attending: PHYSICIAN ASSISTANT
Payer: COMMERCIAL

## 2018-03-12 ENCOUNTER — APPOINTMENT (OUTPATIENT)
Dept: GENERAL RADIOLOGY | Facility: CLINIC | Age: 43
DRG: 391 | End: 2018-03-12
Attending: INTERNAL MEDICINE
Payer: COMMERCIAL

## 2018-03-12 ENCOUNTER — ANESTHESIA (OUTPATIENT)
Dept: SURGERY | Facility: CLINIC | Age: 43
DRG: 391 | End: 2018-03-12
Payer: COMMERCIAL

## 2018-03-12 LAB
ANION GAP SERPL CALCULATED.3IONS-SCNC: 10 MMOL/L (ref 3–14)
ANION GAP SERPL CALCULATED.3IONS-SCNC: 11 MMOL/L (ref 3–14)
ANION GAP SERPL CALCULATED.3IONS-SCNC: 12 MMOL/L (ref 3–14)
ANION GAP SERPL CALCULATED.3IONS-SCNC: 9 MMOL/L (ref 3–14)
BASOPHILS # BLD AUTO: 0 10E9/L (ref 0–0.2)
BASOPHILS NFR BLD AUTO: 0.2 %
BUN SERPL-MCNC: 4 MG/DL (ref 7–30)
BUN SERPL-MCNC: 4 MG/DL (ref 7–30)
BUN SERPL-MCNC: 5 MG/DL (ref 7–30)
BUN SERPL-MCNC: 5 MG/DL (ref 7–30)
C DIFF STL QL CULT: ABNORMAL
C PEPTIDE SERPL-MCNC: <0.1 NG/ML (ref 0.9–6.9)
CALCIUM SERPL-MCNC: 7 MG/DL (ref 8.5–10.1)
CALCIUM SERPL-MCNC: 8.2 MG/DL (ref 8.5–10.1)
CALCIUM SERPL-MCNC: 8.2 MG/DL (ref 8.5–10.1)
CALCIUM SERPL-MCNC: 8.3 MG/DL (ref 8.5–10.1)
CHLORIDE SERPL-SCNC: 112 MMOL/L (ref 94–109)
CHLORIDE SERPL-SCNC: 113 MMOL/L (ref 94–109)
CHLORIDE SERPL-SCNC: 114 MMOL/L (ref 94–109)
CHLORIDE SERPL-SCNC: 118 MMOL/L (ref 94–109)
CO2 SERPL-SCNC: 12 MMOL/L (ref 20–32)
CO2 SERPL-SCNC: 17 MMOL/L (ref 20–32)
CO2 SERPL-SCNC: 19 MMOL/L (ref 20–32)
CO2 SERPL-SCNC: 19 MMOL/L (ref 20–32)
CREAT SERPL-MCNC: 0.31 MG/DL (ref 0.52–1.04)
CREAT SERPL-MCNC: 0.36 MG/DL (ref 0.52–1.04)
CREAT SERPL-MCNC: 0.42 MG/DL (ref 0.52–1.04)
CREAT SERPL-MCNC: 0.49 MG/DL (ref 0.52–1.04)
DIFFERENTIAL METHOD BLD: NORMAL
EOSINOPHIL # BLD AUTO: 0 10E9/L (ref 0–0.7)
EOSINOPHIL NFR BLD AUTO: 0.4 %
ERYTHROCYTE [DISTWIDTH] IN BLOOD BY AUTOMATED COUNT: 14.4 % (ref 10–15)
GFR SERPL CREATININE-BSD FRML MDRD: >90 ML/MIN/1.7M2
GLUCOSE BLDC GLUCOMTR-MCNC: 103 MG/DL (ref 70–99)
GLUCOSE BLDC GLUCOMTR-MCNC: 108 MG/DL (ref 70–99)
GLUCOSE BLDC GLUCOMTR-MCNC: 115 MG/DL (ref 70–99)
GLUCOSE BLDC GLUCOMTR-MCNC: 129 MG/DL (ref 70–99)
GLUCOSE BLDC GLUCOMTR-MCNC: 134 MG/DL (ref 70–99)
GLUCOSE BLDC GLUCOMTR-MCNC: 134 MG/DL (ref 70–99)
GLUCOSE BLDC GLUCOMTR-MCNC: 141 MG/DL (ref 70–99)
GLUCOSE BLDC GLUCOMTR-MCNC: 148 MG/DL (ref 70–99)
GLUCOSE BLDC GLUCOMTR-MCNC: 167 MG/DL (ref 70–99)
GLUCOSE BLDC GLUCOMTR-MCNC: 179 MG/DL (ref 70–99)
GLUCOSE BLDC GLUCOMTR-MCNC: 187 MG/DL (ref 70–99)
GLUCOSE BLDC GLUCOMTR-MCNC: 192 MG/DL (ref 70–99)
GLUCOSE BLDC GLUCOMTR-MCNC: 192 MG/DL (ref 70–99)
GLUCOSE BLDC GLUCOMTR-MCNC: 195 MG/DL (ref 70–99)
GLUCOSE BLDC GLUCOMTR-MCNC: 206 MG/DL (ref 70–99)
GLUCOSE BLDC GLUCOMTR-MCNC: 249 MG/DL (ref 70–99)
GLUCOSE BLDC GLUCOMTR-MCNC: 288 MG/DL (ref 70–99)
GLUCOSE BLDC GLUCOMTR-MCNC: 348 MG/DL (ref 70–99)
GLUCOSE BLDC GLUCOMTR-MCNC: 69 MG/DL (ref 70–99)
GLUCOSE BLDC GLUCOMTR-MCNC: 85 MG/DL (ref 70–99)
GLUCOSE SERPL-MCNC: 118 MG/DL (ref 70–99)
GLUCOSE SERPL-MCNC: 119 MG/DL (ref 70–99)
GLUCOSE SERPL-MCNC: 153 MG/DL (ref 70–99)
GLUCOSE SERPL-MCNC: 91 MG/DL (ref 70–99)
HCT VFR BLD AUTO: 36.3 % (ref 35–47)
HGB BLD-MCNC: 13 G/DL (ref 11.7–15.7)
IMM GRANULOCYTES # BLD: 0 10E9/L (ref 0–0.4)
IMM GRANULOCYTES NFR BLD: 0.2 %
LYMPHOCYTES # BLD AUTO: 1.7 10E9/L (ref 0.8–5.3)
LYMPHOCYTES NFR BLD AUTO: 36 %
MAGNESIUM SERPL-MCNC: 1.6 MG/DL (ref 1.6–2.3)
MCH RBC QN AUTO: 31.4 PG (ref 26.5–33)
MCHC RBC AUTO-ENTMCNC: 35.8 G/DL (ref 31.5–36.5)
MCV RBC AUTO: 88 FL (ref 78–100)
MONOCYTES # BLD AUTO: 0.4 10E9/L (ref 0–1.3)
MONOCYTES NFR BLD AUTO: 7.5 %
NEUTROPHILS # BLD AUTO: 2.7 10E9/L (ref 1.6–8.3)
NEUTROPHILS NFR BLD AUTO: 55.7 %
NRBC # BLD AUTO: 0 10*3/UL
NRBC BLD AUTO-RTO: 0 /100
PHOSPHATE SERPL-MCNC: 3.6 MG/DL (ref 2.5–4.5)
PLATELET # BLD AUTO: 151 10E9/L (ref 150–450)
POTASSIUM SERPL-SCNC: 2.8 MMOL/L (ref 3.4–5.3)
POTASSIUM SERPL-SCNC: 3.1 MMOL/L (ref 3.4–5.3)
POTASSIUM SERPL-SCNC: 3.2 MMOL/L (ref 3.4–5.3)
POTASSIUM SERPL-SCNC: 3.3 MMOL/L (ref 3.4–5.3)
RBC # BLD AUTO: 4.14 10E12/L (ref 3.8–5.2)
SODIUM SERPL-SCNC: 141 MMOL/L (ref 133–144)
SODIUM SERPL-SCNC: 141 MMOL/L (ref 133–144)
SODIUM SERPL-SCNC: 142 MMOL/L (ref 133–144)
SODIUM SERPL-SCNC: 142 MMOL/L (ref 133–144)
SPECIMEN SOURCE: ABNORMAL
TRIGL SERPL-MCNC: 58 MG/DL
VIT B12 SERPL-MCNC: 598 PG/ML (ref 193–986)
WBC # BLD AUTO: 4.8 10E9/L (ref 4–11)

## 2018-03-12 PROCEDURE — 36000051 ZZH SURGERY LEVEL 2 1ST 30 MIN - UMMC: Performed by: INTERNAL MEDICINE

## 2018-03-12 PROCEDURE — 40000170 ZZH STATISTIC PRE-PROCEDURE ASSESSMENT II: Performed by: INTERNAL MEDICINE

## 2018-03-12 PROCEDURE — 36415 COLL VENOUS BLD VENIPUNCTURE: CPT | Performed by: SURGERY

## 2018-03-12 PROCEDURE — 85025 COMPLETE CBC W/AUTO DIFF WBC: CPT | Performed by: PHYSICIAN ASSISTANT

## 2018-03-12 PROCEDURE — 25000128 H RX IP 250 OP 636: Performed by: PHYSICIAN ASSISTANT

## 2018-03-12 PROCEDURE — 25000125 ZZHC RX 250: Performed by: SURGERY

## 2018-03-12 PROCEDURE — 83735 ASSAY OF MAGNESIUM: CPT | Performed by: PHYSICIAN ASSISTANT

## 2018-03-12 PROCEDURE — 25000128 H RX IP 250 OP 636: Performed by: ANESTHESIOLOGY

## 2018-03-12 PROCEDURE — 80048 BASIC METABOLIC PNL TOTAL CA: CPT | Performed by: PHYSICIAN ASSISTANT

## 2018-03-12 PROCEDURE — 27210443 ZZH NUTRITION PRODUCT SPECIALIZED PACKET

## 2018-03-12 PROCEDURE — 25000125 ZZHC RX 250: Performed by: NURSE ANESTHETIST, CERTIFIED REGISTERED

## 2018-03-12 PROCEDURE — 25000565 ZZH ISOFLURANE, EA 15 MIN: Performed by: INTERNAL MEDICINE

## 2018-03-12 PROCEDURE — 84446 ASSAY OF VITAMIN E: CPT | Performed by: PHYSICIAN ASSISTANT

## 2018-03-12 PROCEDURE — 27210794 ZZH OR GENERAL SUPPLY STERILE: Performed by: INTERNAL MEDICINE

## 2018-03-12 PROCEDURE — 84597 ASSAY OF VITAMIN K: CPT | Performed by: PHYSICIAN ASSISTANT

## 2018-03-12 PROCEDURE — 36415 COLL VENOUS BLD VENIPUNCTURE: CPT | Performed by: PHYSICIAN ASSISTANT

## 2018-03-12 PROCEDURE — 40000986 XR CHEST 1 VW

## 2018-03-12 PROCEDURE — 25000132 ZZH RX MED GY IP 250 OP 250 PS 637: Performed by: SURGERY

## 2018-03-12 PROCEDURE — 36000053 ZZH SURGERY LEVEL 2 EA 15 ADDTL MIN - UMMC: Performed by: INTERNAL MEDICINE

## 2018-03-12 PROCEDURE — 80048 BASIC METABOLIC PNL TOTAL CA: CPT | Performed by: SURGERY

## 2018-03-12 PROCEDURE — 00000146 ZZHCL STATISTIC GLUCOSE BY METER IP

## 2018-03-12 PROCEDURE — 82607 VITAMIN B-12: CPT | Performed by: PHYSICIAN ASSISTANT

## 2018-03-12 PROCEDURE — C1769 GUIDE WIRE: HCPCS | Performed by: INTERNAL MEDICINE

## 2018-03-12 PROCEDURE — 40000279 XR SURGERY CARM FLUORO GREATER THAN 5 MIN W STILLS: Mod: TC

## 2018-03-12 PROCEDURE — 27210208 ZZH KIT VALVED DOUBLE LUMEN

## 2018-03-12 PROCEDURE — 0DHA3UZ INSERTION OF FEEDING DEVICE INTO JEJUNUM, PERCUTANEOUS APPROACH: ICD-10-PCS | Performed by: INTERNAL MEDICINE

## 2018-03-12 PROCEDURE — 71000015 ZZH RECOVERY PHASE 1 LEVEL 2 EA ADDTL HR: Performed by: INTERNAL MEDICINE

## 2018-03-12 PROCEDURE — 25000128 H RX IP 250 OP 636: Performed by: NURSE ANESTHETIST, CERTIFIED REGISTERED

## 2018-03-12 PROCEDURE — 84478 ASSAY OF TRIGLYCERIDES: CPT | Performed by: PHYSICIAN ASSISTANT

## 2018-03-12 PROCEDURE — C1769 GUIDE WIRE: HCPCS

## 2018-03-12 PROCEDURE — 37000008 ZZH ANESTHESIA TECHNICAL FEE, 1ST 30 MIN: Performed by: INTERNAL MEDICINE

## 2018-03-12 PROCEDURE — 25000132 ZZH RX MED GY IP 250 OP 250 PS 637: Performed by: PHYSICIAN ASSISTANT

## 2018-03-12 PROCEDURE — 36569 INSJ PICC 5 YR+ W/O IMAGING: CPT

## 2018-03-12 PROCEDURE — 36592 COLLECT BLOOD FROM PICC: CPT | Performed by: SURGERY

## 2018-03-12 PROCEDURE — 25000128 H RX IP 250 OP 636: Performed by: SURGERY

## 2018-03-12 PROCEDURE — 84590 ASSAY OF VITAMIN A: CPT | Performed by: PHYSICIAN ASSISTANT

## 2018-03-12 PROCEDURE — 25000125 ZZHC RX 250: Performed by: PHYSICIAN ASSISTANT

## 2018-03-12 PROCEDURE — 84100 ASSAY OF PHOSPHORUS: CPT | Performed by: PHYSICIAN ASSISTANT

## 2018-03-12 PROCEDURE — 71000014 ZZH RECOVERY PHASE 1 LEVEL 2 FIRST HR: Performed by: INTERNAL MEDICINE

## 2018-03-12 PROCEDURE — 37000009 ZZH ANESTHESIA TECHNICAL FEE, EACH ADDTL 15 MIN: Performed by: INTERNAL MEDICINE

## 2018-03-12 PROCEDURE — 12000024 ZZH R&B TRANSPLANT CRITICAL

## 2018-03-12 PROCEDURE — 25500064 ZZH RX 255 OP 636

## 2018-03-12 PROCEDURE — 3E0G76Z INTRODUCTION OF NUTRITIONAL SUBSTANCE INTO UPPER GI, VIA NATURAL OR ARTIFICIAL OPENING: ICD-10-PCS | Performed by: SURGERY

## 2018-03-12 PROCEDURE — C1751 CATH, INF, PER/CENT/MIDLINE: HCPCS

## 2018-03-12 PROCEDURE — 25800025 ZZH RX 258: Performed by: PHYSICIAN ASSISTANT

## 2018-03-12 PROCEDURE — C9399 UNCLASSIFIED DRUGS OR BIOLOG: HCPCS | Performed by: NURSE ANESTHETIST, CERTIFIED REGISTERED

## 2018-03-12 PROCEDURE — 84681 ASSAY OF C-PEPTIDE: CPT | Performed by: PHYSICIAN ASSISTANT

## 2018-03-12 RX ORDER — POTASSIUM CL/LIDO/0.9 % NACL 10MEQ/0.1L
10 INTRAVENOUS SOLUTION, PIGGYBACK (ML) INTRAVENOUS
Status: COMPLETED | OUTPATIENT
Start: 2018-03-12 | End: 2018-03-12

## 2018-03-12 RX ORDER — POTASSIUM CHLORIDE 29.8 MG/ML
20 INJECTION INTRAVENOUS
Status: COMPLETED | OUTPATIENT
Start: 2018-03-12 | End: 2018-03-13

## 2018-03-12 RX ORDER — PROPOFOL 10 MG/ML
INJECTION, EMULSION INTRAVENOUS PRN
Status: DISCONTINUED | OUTPATIENT
Start: 2018-03-12 | End: 2018-03-12

## 2018-03-12 RX ORDER — METOCLOPRAMIDE HYDROCHLORIDE 5 MG/5ML
5 SOLUTION ORAL 4 TIMES DAILY PRN
Status: DISCONTINUED | OUTPATIENT
Start: 2018-03-12 | End: 2018-03-18 | Stop reason: HOSPADM

## 2018-03-12 RX ORDER — SODIUM CHLORIDE, SODIUM LACTATE, POTASSIUM CHLORIDE, CALCIUM CHLORIDE 600; 310; 30; 20 MG/100ML; MG/100ML; MG/100ML; MG/100ML
INJECTION, SOLUTION INTRAVENOUS CONTINUOUS PRN
Status: DISCONTINUED | OUTPATIENT
Start: 2018-03-12 | End: 2018-03-12

## 2018-03-12 RX ORDER — FENTANYL CITRATE 50 UG/ML
25-50 INJECTION, SOLUTION INTRAMUSCULAR; INTRAVENOUS
Status: CANCELLED | OUTPATIENT
Start: 2018-03-12

## 2018-03-12 RX ORDER — LIDOCAINE 40 MG/G
CREAM TOPICAL
Status: DISCONTINUED | OUTPATIENT
Start: 2018-03-12 | End: 2018-03-18 | Stop reason: HOSPADM

## 2018-03-12 RX ORDER — ONDANSETRON 2 MG/ML
4 INJECTION INTRAMUSCULAR; INTRAVENOUS EVERY 30 MIN PRN
Status: DISCONTINUED | OUTPATIENT
Start: 2018-03-12 | End: 2018-03-12 | Stop reason: HOSPADM

## 2018-03-12 RX ORDER — FENTANYL CITRATE 50 UG/ML
INJECTION, SOLUTION INTRAMUSCULAR; INTRAVENOUS PRN
Status: DISCONTINUED | OUTPATIENT
Start: 2018-03-12 | End: 2018-03-12

## 2018-03-12 RX ORDER — LIDOCAINE 40 MG/G
CREAM TOPICAL
Status: DISCONTINUED | OUTPATIENT
Start: 2018-03-12 | End: 2018-03-12 | Stop reason: HOSPADM

## 2018-03-12 RX ORDER — MIRTAZAPINE 15 MG/1
7.5 TABLET, ORALLY DISINTEGRATING ORAL AT BEDTIME
Status: DISCONTINUED | OUTPATIENT
Start: 2018-03-12 | End: 2018-03-18 | Stop reason: HOSPADM

## 2018-03-12 RX ORDER — PROCHLORPERAZINE MALEATE 5 MG
10 TABLET ORAL EVERY 6 HOURS PRN
Status: DISCONTINUED | OUTPATIENT
Start: 2018-03-12 | End: 2018-03-18 | Stop reason: HOSPADM

## 2018-03-12 RX ORDER — FENTANYL CITRATE 50 UG/ML
25-50 INJECTION, SOLUTION INTRAMUSCULAR; INTRAVENOUS EVERY 5 MIN PRN
Status: DISCONTINUED | OUTPATIENT
Start: 2018-03-12 | End: 2018-03-12 | Stop reason: HOSPADM

## 2018-03-12 RX ORDER — HYDRALAZINE HYDROCHLORIDE 20 MG/ML
2.5-5 INJECTION INTRAMUSCULAR; INTRAVENOUS EVERY 10 MIN PRN
Status: DISCONTINUED | OUTPATIENT
Start: 2018-03-12 | End: 2018-03-12 | Stop reason: HOSPADM

## 2018-03-12 RX ORDER — FLUMAZENIL 0.1 MG/ML
0.2 INJECTION, SOLUTION INTRAVENOUS
Status: CANCELLED | OUTPATIENT
Start: 2018-03-12 | End: 2018-03-13

## 2018-03-12 RX ORDER — NALOXONE HYDROCHLORIDE 0.4 MG/ML
.1-.4 INJECTION, SOLUTION INTRAMUSCULAR; INTRAVENOUS; SUBCUTANEOUS
Status: CANCELLED | OUTPATIENT
Start: 2018-03-12 | End: 2018-03-13

## 2018-03-12 RX ORDER — PROCHLORPERAZINE 25 MG
25 SUPPOSITORY, RECTAL RECTAL EVERY 12 HOURS PRN
Status: DISCONTINUED | OUTPATIENT
Start: 2018-03-12 | End: 2018-03-18 | Stop reason: HOSPADM

## 2018-03-12 RX ORDER — ONDANSETRON 2 MG/ML
INJECTION INTRAMUSCULAR; INTRAVENOUS PRN
Status: DISCONTINUED | OUTPATIENT
Start: 2018-03-12 | End: 2018-03-12

## 2018-03-12 RX ORDER — HYDROMORPHONE HCL/0.9% NACL/PF 0.2MG/0.2
.2-.4 SYRINGE (ML) INTRAVENOUS EVERY 10 MIN PRN
Status: DISCONTINUED | OUTPATIENT
Start: 2018-03-12 | End: 2018-03-12 | Stop reason: HOSPADM

## 2018-03-12 RX ORDER — ONDANSETRON 4 MG/1
4 TABLET, ORALLY DISINTEGRATING ORAL EVERY 30 MIN PRN
Status: DISCONTINUED | OUTPATIENT
Start: 2018-03-12 | End: 2018-03-12 | Stop reason: HOSPADM

## 2018-03-12 RX ORDER — PROPOFOL 10 MG/ML
INJECTION, EMULSION INTRAVENOUS CONTINUOUS PRN
Status: DISCONTINUED | OUTPATIENT
Start: 2018-03-12 | End: 2018-03-12

## 2018-03-12 RX ORDER — SODIUM CHLORIDE, SODIUM LACTATE, POTASSIUM CHLORIDE, CALCIUM CHLORIDE 600; 310; 30; 20 MG/100ML; MG/100ML; MG/100ML; MG/100ML
INJECTION, SOLUTION INTRAVENOUS CONTINUOUS
Status: DISCONTINUED | OUTPATIENT
Start: 2018-03-12 | End: 2018-03-12 | Stop reason: HOSPADM

## 2018-03-12 RX ORDER — LIDOCAINE HYDROCHLORIDE 20 MG/ML
INJECTION, SOLUTION INFILTRATION; PERINEURAL PRN
Status: DISCONTINUED | OUTPATIENT
Start: 2018-03-12 | End: 2018-03-12

## 2018-03-12 RX ORDER — MEPERIDINE HYDROCHLORIDE 50 MG/ML
12.5 INJECTION INTRAMUSCULAR; INTRAVENOUS; SUBCUTANEOUS
Status: DISCONTINUED | OUTPATIENT
Start: 2018-03-12 | End: 2018-03-12 | Stop reason: HOSPADM

## 2018-03-12 RX ORDER — NALOXONE HYDROCHLORIDE 0.4 MG/ML
.1-.4 INJECTION, SOLUTION INTRAMUSCULAR; INTRAVENOUS; SUBCUTANEOUS
Status: DISCONTINUED | OUTPATIENT
Start: 2018-03-12 | End: 2018-03-12 | Stop reason: HOSPADM

## 2018-03-12 RX ORDER — POLYETHYLENE GLYCOL 3350 17 G/17G
8.5 POWDER, FOR SOLUTION ORAL 2 TIMES DAILY
Status: DISCONTINUED | OUTPATIENT
Start: 2018-03-12 | End: 2018-03-12

## 2018-03-12 RX ADMIN — PROPOFOL 125 MCG/KG/MIN: 10 INJECTION, EMULSION INTRAVENOUS at 12:44

## 2018-03-12 RX ADMIN — LIDOCAINE HYDROCHLORIDE 3 ML: 10 INJECTION, SOLUTION EPIDURAL; INFILTRATION; INTRACAUDAL; PERINEURAL at 09:20

## 2018-03-12 RX ADMIN — POTASSIUM CHLORIDE 20 MEQ: 400 INJECTION, SOLUTION INTRAVENOUS at 11:15

## 2018-03-12 RX ADMIN — VITAMIN D, TAB 1000IU (100/BT) 3000 UNITS: 25 TAB at 08:59

## 2018-03-12 RX ADMIN — HUMAN INSULIN 5 UNITS/HR: 100 INJECTION, SOLUTION SUBCUTANEOUS at 19:00

## 2018-03-12 RX ADMIN — POTASSIUM CHLORIDE 20 MEQ: 400 INJECTION, SOLUTION INTRAVENOUS at 16:25

## 2018-03-12 RX ADMIN — Medication 10 MEQ: at 03:27

## 2018-03-12 RX ADMIN — Medication 60 MG: at 12:42

## 2018-03-12 RX ADMIN — FENTANYL CITRATE 25 MCG: 50 INJECTION, SOLUTION INTRAMUSCULAR; INTRAVENOUS at 14:32

## 2018-03-12 RX ADMIN — FENTANYL CITRATE 50 MCG: 50 INJECTION, SOLUTION INTRAMUSCULAR; INTRAVENOUS at 14:46

## 2018-03-12 RX ADMIN — ONDANSETRON 4 MG: 2 INJECTION INTRAMUSCULAR; INTRAVENOUS at 16:21

## 2018-03-12 RX ADMIN — HUMAN INSULIN 1.5 UNITS/HR: 100 INJECTION, SOLUTION SUBCUTANEOUS at 14:38

## 2018-03-12 RX ADMIN — OXYCODONE HYDROCHLORIDE 5 MG: 5 SOLUTION ORAL at 08:58

## 2018-03-12 RX ADMIN — PROCHLORPERAZINE EDISYLATE 10 MG: 5 INJECTION INTRAMUSCULAR; INTRAVENOUS at 19:48

## 2018-03-12 RX ADMIN — FENTANYL CITRATE 50 MCG: 50 INJECTION, SOLUTION INTRAMUSCULAR; INTRAVENOUS at 13:05

## 2018-03-12 RX ADMIN — FENTANYL CITRATE 50 MCG: 50 INJECTION, SOLUTION INTRAMUSCULAR; INTRAVENOUS at 13:30

## 2018-03-12 RX ADMIN — DULOXETINE 20 MG: 20 CAPSULE, DELAYED RELEASE ORAL at 09:00

## 2018-03-12 RX ADMIN — LIDOCAINE HYDROCHLORIDE 60 MG: 20 INJECTION, SOLUTION INFILTRATION; PERINEURAL at 12:42

## 2018-03-12 RX ADMIN — ONDANSETRON 4 MG: 2 INJECTION INTRAMUSCULAR; INTRAVENOUS at 15:13

## 2018-03-12 RX ADMIN — HYDROMORPHONE HYDROCHLORIDE 0.3 MG: 1 INJECTION, SOLUTION INTRAMUSCULAR; INTRAVENOUS; SUBCUTANEOUS at 15:05

## 2018-03-12 RX ADMIN — SODIUM CHLORIDE, POTASSIUM CHLORIDE, SODIUM LACTATE AND CALCIUM CHLORIDE: 600; 310; 30; 20 INJECTION, SOLUTION INTRAVENOUS at 12:30

## 2018-03-12 RX ADMIN — PROPOFOL 40 MG: 10 INJECTION, EMULSION INTRAVENOUS at 13:05

## 2018-03-12 RX ADMIN — PROPOFOL 40 MG: 10 INJECTION, EMULSION INTRAVENOUS at 12:51

## 2018-03-12 RX ADMIN — VITAMIN D, TAB 1000IU (100/BT) 5000 UNITS: 25 TAB at 09:00

## 2018-03-12 RX ADMIN — POTASSIUM & SODIUM PHOSPHATES POWDER PACK 280-160-250 MG 2 PACKET: 280-160-250 PACK at 22:00

## 2018-03-12 RX ADMIN — MIDAZOLAM 1 MG: 1 INJECTION INTRAMUSCULAR; INTRAVENOUS at 13:19

## 2018-03-12 RX ADMIN — Medication 7.5 MG: at 22:00

## 2018-03-12 RX ADMIN — OXYCODONE HYDROCHLORIDE 5 MG: 5 SOLUTION ORAL at 20:08

## 2018-03-12 RX ADMIN — ONDANSETRON 4 MG: 2 INJECTION INTRAMUSCULAR; INTRAVENOUS at 13:00

## 2018-03-12 RX ADMIN — PANTOPRAZOLE SODIUM 40 MG: 40 INJECTION, POWDER, FOR SOLUTION INTRAVENOUS at 08:23

## 2018-03-12 RX ADMIN — FENTANYL CITRATE 50 MCG: 50 INJECTION, SOLUTION INTRAMUSCULAR; INTRAVENOUS at 12:51

## 2018-03-12 RX ADMIN — DEXTROSE MONOHYDRATE 25 ML: 25 INJECTION, SOLUTION INTRAVENOUS at 11:11

## 2018-03-12 RX ADMIN — PROPOFOL 110 MG: 10 INJECTION, EMULSION INTRAVENOUS at 12:42

## 2018-03-12 RX ADMIN — MAGNESIUM SULFATE HEPTAHYDRATE: 500 INJECTION, SOLUTION INTRAMUSCULAR; INTRAVENOUS at 21:21

## 2018-03-12 RX ADMIN — ONDANSETRON 4 MG: 2 INJECTION INTRAMUSCULAR; INTRAVENOUS at 08:17

## 2018-03-12 RX ADMIN — I.V. FAT EMULSION 250 ML: 20 EMULSION INTRAVENOUS at 21:21

## 2018-03-12 RX ADMIN — DEXTROSE AND SODIUM CHLORIDE 50 ML/HR: 5; 900 INJECTION, SOLUTION INTRAVENOUS at 15:26

## 2018-03-12 RX ADMIN — OXYCODONE HYDROCHLORIDE 5 MG: 5 SOLUTION ORAL at 22:34

## 2018-03-12 RX ADMIN — ROCURONIUM BROMIDE 20 MG: 10 INJECTION INTRAVENOUS at 13:34

## 2018-03-12 RX ADMIN — Medication 10 MEQ: at 05:22

## 2018-03-12 RX ADMIN — Medication 1 ML: at 08:59

## 2018-03-12 RX ADMIN — POTASSIUM & SODIUM PHOSPHATES POWDER PACK 280-160-250 MG 2 PACKET: 280-160-250 PACK at 03:27

## 2018-03-12 RX ADMIN — Medication 10 MEQ: at 06:24

## 2018-03-12 RX ADMIN — SODIUM CHLORIDE, PRESERVATIVE FREE 5 ML: 5 INJECTION INTRAVENOUS at 11:01

## 2018-03-12 RX ADMIN — AMITRIPTYLINE HYDROCHLORIDE 100 MG: 50 TABLET, FILM COATED ORAL at 22:00

## 2018-03-12 RX ADMIN — FENTANYL CITRATE 25 MCG: 50 INJECTION, SOLUTION INTRAMUSCULAR; INTRAVENOUS at 14:37

## 2018-03-12 RX ADMIN — SUGAMMADEX 80 MG: 100 INJECTION, SOLUTION INTRAVENOUS at 13:50

## 2018-03-12 RX ADMIN — Medication 10 MEQ: at 04:21

## 2018-03-12 RX ADMIN — HYDROMORPHONE HYDROCHLORIDE 0.3 MG: 1 INJECTION, SOLUTION INTRAMUSCULAR; INTRAVENOUS; SUBCUTANEOUS at 14:57

## 2018-03-12 RX ADMIN — DULOXETINE 20 MG: 20 CAPSULE, DELAYED RELEASE ORAL at 22:00

## 2018-03-12 RX ADMIN — FENTANYL CITRATE 50 MCG: 50 INJECTION, SOLUTION INTRAMUSCULAR; INTRAVENOUS at 12:39

## 2018-03-12 RX ADMIN — POTASSIUM CHLORIDE 20 MEQ: 400 INJECTION, SOLUTION INTRAVENOUS at 22:03

## 2018-03-12 RX ADMIN — POLYETHYLENE GLYCOL 3350 8.5 G: 17 POWDER, FOR SOLUTION ORAL at 09:00

## 2018-03-12 RX ADMIN — MIDAZOLAM 2 MG: 1 INJECTION INTRAMUSCULAR; INTRAVENOUS at 12:30

## 2018-03-12 RX ADMIN — POTASSIUM CHLORIDE 20 MEQ: 400 INJECTION, SOLUTION INTRAVENOUS at 18:57

## 2018-03-12 NOTE — CONSULTS
GASTROENTEROLOGY CONSULTATION      Date of Admission:  3/11/2018          ASSESSMENT AND RECOMMENDATIONS:   Assessment:  42 year old female with a history of chronic pancreatitis s/p TPAIT back in 2013 that unfortunately did not work, gastroparesis s/p gastric pacemaker. Patient now presents with ongoing diarrhea, steatorrhea and failure to thrive. Patient with weight loss as well around 40lb since last April. Suspect likely malabsorption all in the setting of chronic pancreatic insufficiency. Patient currently has a PEG tube and we will recommend to convert to a GJ for better nutrition.     Recommendations  --PEG conversion to GJ today in the OR  --Continue TF after OR hold them for now  --Dietician consult   --Continue PTA pancreatic enzymes   --Continue ongoing supportive cares     Gastroenterology follow up recommendations: TBD    Thank you for involving us in this patient's care. Please do not hesitate to contact the GI service with any questions or concerns.     Pt care plan discussed with Dr. Ortiz, GI staff physician.    Rehan Chance  GI Fellow  -------------------------------------------------------------------------------------------------------------------          Chief Complaint:   We were asked by Dr. Cruz of Transplant surgery to evaluate this patient with failure to thrive s/p TPAIT in 2013    History is obtained from the patient and the medical record.          History of Present Illness:   Beverly Elena is a 42 year old female with a history of chronic pancreatitis s/p TPAIT back in 2013 that unfortunately did not work, gastroparesis s/p gastric pacemaker. Patient now presents with ongoing diarrhea, steatorrhea and failure to thrive. Patient with weight loss as well around 40lb since last April. Suspect likely malabsorption all in the setting of chronic pancreatic insufficiency. Patient reports the last couple of months are the ones were she has had more weight loss. She  denies any fevers or chills, no bloody stools and reports intermittent nausea or vomiting. She does reports cramping pain with bowel movements. She has tried many antidiarrhea regimens with no improvement, she also mentions rectal prolapse given how bad her diarrhea is. Otherwise patient denies any SOB or urinary symptoms.            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) 6/28/2013     Endometriosis      Endometriosis 9/9/2011     Exocrine pancreatic insufficiency 6/28/2013     Gastro-oesophageal reflux disease      Gastroparesis      Gastroparesis 7/23/2012     Iron deficiency anemia 7/12/2013     Other chronic pain      Pancreatic disease     history of pancreatitis     Pancreatic divisum      Pancreatic divisum 10/1/2012     Pneumonia, organism 7/17/2013     PONV (postoperative nausea and vomiting)     Nausea     Portal vein thrombosis 6/29/2013     Recurrent acute pancreatitis 1/17/2013     Sphincter of Oddi dysfunction 1/17/2013     Syncope     X 2     Type 2 diabetes mellitus without complications (H)             Past Surgical History:   Reviewed and edited as appropriate   Past Surgical History:   Procedure Laterality Date     APPENDECTOMY       CHOLECYSTECTOMY       COLONOSCOPY  5/21/2014    Procedure: COMBINED COLONOSCOPY, SINGLE BIOPSY/POLYPECTOMY BY BIOPSY;  Surgeon: Hugo Lind MD;  Location:  GI     ENDOSCOPIC INSERTION TUBE GASTROSTOMY  2/5/2014    Procedure: ENDOSCOPIC INSERTION TUBE GASTROSTOMY;  Endoscopic percutaneous gastro-jejunostomy tube placement (removal of previous gastrostomy tube)  ;  Surgeon: Sharon Oh MD;  Location:   OR     ENDOSCOPIC RETROGRADE CHOLANGIOPANCREATOGRAM  9/9/2011    Procedure:ENDOSCOPIC RETROGRADE CHOLANGIOPANCREATOGRAM; Endoscopic Retrograde Cholangiopancreatogram with C Arm, Biliary Sphincterotomy, insertion Bile Duct Stent X 1; Surgeon:JABIER HEATH; Location:[facility]     ENDOSCOPIC RETROGRADE CHOLANGIOPANCREATOGRAM  12/15/2011    Procedure:ENDOSCOPIC RETROGRADE CHOLANGIOPANCREATOGRAM; Endoscopic Retrograde Cholangiopancreatogram (c-arm), with pancreatic stent placement, sphincterotomy; Surgeon:JABIER HEATH; Location:[facility]     ENDOSCOPIC RETROGRADE CHOLANGIOPANCREATOGRAM  6/14/2012    Procedure: ENDOSCOPIC RETROGRADE CHOLANGIOPANCREATOGRAM;  endoscopic retrograde cholangiopancreatogram with pancreatic stent placement  (c-arm);  Surgeon: Jabier Heath MD;  Location: [facility] OR     ESOPHAGOSCOPY, GASTROSCOPY, DUODENOSCOPY (EGD), COMBINED  11/9/2011    Procedure:COMBINED ESOPHAGOSCOPY, GASTROSCOPY, DUODENOSCOPY (EGD); Surgeon:TANIKA MOHAN; Location:[facility]     ESOPHAGOSCOPY, GASTROSCOPY, DUODENOSCOPY (EGD), COMBINED  5/21/2014    Procedure: COMBINED ESOPHAGOSCOPY, GASTROSCOPY, DUODENOSCOPY (EGD), BIOPSY SINGLE OR MULTIPLE;  Surgeon: Hugo Lind MD;  Location: [facility]     HC CHANGE GASTROSTOMY TUBE PERC, WO IMAGING OR ENDO GUIDE N/A 11/25/2014    Procedure: CHANGE GASTROSTOMY TUBE WITHOUT SCOPE;  Surgeon: Sharon Oh MD;  Location: [facility]     HC IMPLANT PERIPH/GASTRIC NEUROSTIM/  1/11/2012    gastric pacemaker     HC REPLACE DUODENOSTOMY/JEJUNOSTOMY TUBE PERCUTANEOUS  2/12/2014    Procedure: REPLACE JEJUNOSTOMY TUBE, PERCUTANEOUS;  Surgeon: Sharon Oh MD;  Location: [facility] OR     HC UGI ENDOSCOPY W EUS  9/7/2011    Procedure:COMBINED ENDOSCOPIC ULTRASOUND, ESOPHAGOSCOPY, GASTROSCOPY, DUODENOSCOPY (EGD); Surgeon:TANIKA MOHAN; Location:[facility]     HC UGI ENDOSCOPY W EUS  12/4/2012    Procedure: COMBINED ENDOSCOPIC ULTRASOUND, ESOPHAGOSCOPY, GASTROSCOPY, DUODENOSCOPY (EGD);   Surgeon: Sharon Oh MD;  Location: [facility]     HC UGI ENDOSCOPY W EUS  5/8/2013    Procedure: COMBINED ENDOSCOPIC ULTRASOUND, ESOPHAGOSCOPY, GASTROSCOPY, DUODENOSCOPY (EGD);  Surgeon: Tree Dean MD;  Location: [facility]     HC UGI ENDOSCOPY W PLACEMENT GASTROSTOMY TUBE PERCUT N/A 2/19/2016    Procedure: COMBINED ESOPHAGOSCOPY, GASTROSCOPY, DUODENOSCOPY (EGD), PLACE PERCUTANEOUS ENDOSCOPIC GASTROSTOMY TUBE;  Surgeon: Sharon Oh MD;  Location: [facility]     LAPAROSCOPIC IMPLANT GASTRIC STIMULATOR  1/11/2012    Procedure:LAPAROSCOPIC IMPLANT GASTRIC STIMULATOR; LAPAROSCOPIC IMPLANT GASTRIC STIMULATOR ; Surgeon:CHONG HART; Location:[facility]     LAPAROSCOPIC SALPINGO-OOPHORECTOMY       LAPAROSCOPIC SALPINGOTOMY      R     LAPAROTOMY EXPLORATORY  6/29/2013    Procedure: LAPAROTOMY EXPLORATORY;  Exploratory laparotomy, portal vein declotting with tissue plasminogen activator administration, thrombectomy and embolectomy of portal vein, intraoperative trans-sonic flow monitoring, intraoperative ultrasound;  Surgeon: Rajendra Cruz MD;  Location: [facility] OR     neurostimulator[  5/2011     PANCREATECTOMY, TRANSPLANT AUTO ISLET CELL, COMBINED  6/28/2013    Procedure: COMBINED PANCREATECTOMY, TRANSPLANT AUTO ISLET CELL;  Total Pancreatectomy, Auto Islet Cell Transplant             Anesthesia General with block;  Surgeon: Rajendra Cruz MD;  Location: [facility] OR     PICC INSERTION Right 06/16/2017    5fr TL Bard PICC, 37cm (3cm external) in the R basilic vein w/ tip in the low SVC     TUBAL LIGATION              Previous Endoscopy:     Results for orders placed or performed during the hospital encounter of 05/21/14   COLONOSCOPY   Result Value Ref Range    COLONOSCOPY       [address] [place] [address] [phone]     Endoscopy Department  _______________________________________________________________________________  Patient Name: Beverly Elena      Procedure Date: 5/21/2014  07:05:SS A5/P5    MRN: [id number]                       Account Number: [id number]                 YOB: 1975             Admit Type: Outpatient                      Age: 38                               Room: [facility]                     Gender: Female                        Note Status: Finalized                      Attending MD: Hugo Lockwood MD        Pause for the Cause: Pause for the cause   completed  _______________________________________________________________________________     Procedure:                Colonoscopy  Indications:              Clinically significant diarrhea of unexplained                             origin  Providers:                Hugo Lind MD, Jorge Vincent RN  Referring  MD:             Hugo Lind MD  Medicines:                Midazolam 5 mg IV, Fentanyl 200 micrograms IV  Complications:            No immediate complications  _______________________________________________________________________________  Procedure:                Pre-Anesthesia Assessment:                            - Prior to the procedure, a History and Physical                             was performed, and patient medications, allergies                             and sensitivities have been reviewed. The patient's                             tolerance of previous anesthesia has been reviewed.                            - The risks and benefits of the procedure and the                             sedation options and risks were discussed with the                             patient. All questions were answered and informed                             consent was obtained.                            - Patient identification and proposed procedure                             w ere verified prior to the procedure by the                             physician and the nurse. The procedure was verified                             in the procedure room at 08:01 AM.                             - Pre-procedure physical examination revealed no                             contraindications to sedation.                            - ASA Grade Assessment: III - A patient with severe                             systemic disease.                            - After reviewing the risks and benefits, the                             patient was deemed in satisfactory condition to                             undergo the procedure.                            - The anesthesia plan was to use moderate                             sedation/analgesia (conscious sedation).                            - Immediately prior to administration of                             medications, the patient was re-assessed for                             adequacy to receive sedatives.                             - The heart rate, respiratory rate, oxygen                             saturations, blood pressure, adequacy of pulmonary                             ventilation, and response to care were monitored                             throughout the procedure.                            - The physical status of the patient was                             re-assessed after the procedure.                            After obtaining informed consent, the colonoscope                             was passed under direct vision. Throughout the                             procedure, the patient's blood pressure, pulse, and                             oxygen saturations were monitored continuously. The                             Colonoscope was introduced through the anus and                             advanced to the terminal ileum. The colonoscopy was                             performed without difficulty. The patient tolerated                             the procedure well. The quality  of the bowel                             preparation was poor.                                                                                    Findings:       The perianal and digital rectal examinations were normal. The terminal        ileum appeared normal. The colon (entire examined portion) appeared        normal. Fat droplets were noted throughout. Biopsies were taken with a        cold forceps from the entire colon for evaluation of microscopic colitis.                                                                                   Impression:               - Preparation of the colon was poor.                            - The examined portion of the ileum was normal.                            - The entire examined colon is normal. This was                             biopsied.  Recommendation:           - Await pathology results.                                                                                     Electronically signed by Hugo Lind MD  __________________   Hugo Lind MD  Signed Date: 5/21/2014 09:05:EUGENIO A5/P5  Number of Addenda: 0  Note Initiated On: 5/21/2014 07:05:EUGENIO A5/P5  Scope Withdrawal Time: 0 hours 0 minutes 0 seconds   Scope Withdrawal Time: 0 hours 0 minutes 0 seconds   Total Procedure Duration: 0 hours 0 minutes 0 seconds   Total Procedure Duration: 0 hours 0 minutes 0 seconds               Social History:   Reviewed and edited as appropriate  Social History     Social History     Marital status:      Spouse name: Chau     Number of children: 0     Years of education: 24     Occupational History     ER physician      Social History Main Topics     Smoking status: Never Smoker     Smokeless tobacco: Never Used     Alcohol use No     Drug use: No     Sexual activity: Yes     Birth control/ protection: Surgical      Comment: Lupron     Other Topics Concern     Blood Transfusions No     Seat Belt Yes     Social History Narrative            Family History:   Reviewed and edited as appropriate  No known history of gastrointestinal/liver disease or  gastrointestinal malignancies       Allergies:   Reviewed and  "edited as appropriate     Allergies   Allergen Reactions     Penicillin G Anaphylaxis     Penicillins Anaphylaxis     06/2013 - pt tolerated ertapenem     Corticosteroids Other (See Comments)     All oral,IV and injectable steroids are contraindicated (unless in life threatening situations) in Islet Auto transplant recipients. They can cause irreversible loss of islet cell function. Please contact patients transplant care coordinator LAUREN Gerard RN at /pager   and Endocrinologist prior to administration.     Ertapenem Other (See Comments)     Elevated LFT's  Elevated LFT's     Merrem [Meropenem] Other (See Comments)     Elevated LFTs >3x, happened with invanz too  Elevated LFT's     Other [Seasonal Allergies]      Orange causes rash, NV     Vancomycin Other (See Comments)     osiel syndrome     Vancomycin Other (See Comments)     Gets \"red lizandro syndrome\"     Orange Oil Nausea and Vomiting and Rash            Medications:     Current Facility-Administered Medications   Medication     pantoprazole (PROTONIX) 40 mg IV push injection     ranitidine (Zantac) syrup 150 mg     mirtazapine (REMERON SOL-TAB) solu-half-tab 7.5 mg     polyethylene glycol (MIRALAX/GLYCOLAX) Packet 8.5 g     metoclopramide (REGLAN) solution 5 mg     potassium chloride 20 mEq in 50 mL intermittent infusion     sodium chloride (PF) 0.9% PF flush 10-20 mL     sodium chloride (PF) 0.9% PF flush 10 mL     lidocaine 1 % 1 mL     lidocaine (LMX4) kit     sodium chloride (PF) 0.9% PF flush 3 mL     sodium chloride (PF) 0.9% PF flush 3 mL     naloxone (NARCAN) injection 0.1-0.4 mg     ondansetron (ZOFRAN-ODT) ODT tab 4 mg    Or     ondansetron (ZOFRAN) injection 4 mg     prochlorperazine (COMPAZINE) injection 10 mg    Or     prochlorperazine (COMPAZINE) tablet 10 mg    Or     prochlorperazine (COMPAZINE) Suppository 25 mg     dextrose 10 % 1,000 mL infusion     insulin 1 unit/mL in saline (NovoLIN, HumuLIN Regular) drip - " "ADULT IV Infusion     glucose 40 % gel 15-30 g    Or     dextrose 50 % injection 25-50 mL    Or     glucagon injection 1 mg     oxyCODONE (ROXICODONE) solution 5-10 mg     amitriptyline (ELAVIL) tablet 100 mg     rizatriptan (MAXALT) tablet 10 mg     multivitamin CF formula (AquADEKS) liquid 1 mL     DULoxetine (CYMBALTA) EC capsule 20 mg     lidocaine (LMX4) kit     heparin lock flush 10 UNIT/ML injection 2-5 mL     cholecalciferol (vitamin D3) tablet 3,000 Units    And     cholecalciferol (vitamin D3) tablet 5,000 Units     dextrose 10 % 1,000 mL infusion     dextrose 5% and 0.9% NaCl infusion     potassium & sodium phosphates (NEUTRA-PHOS) Packet 2 packet             Review of Systems:   A complete review of systems was performed and is negative except as noted in the HPI           Physical Exam:   BP 99/74  Pulse 88  Temp 99.1  F (37.3  C)  Resp 16  Ht 1.575 m (5' 2\")  Wt 44 kg (97 lb)  SpO2 96%  BMI 17.74 kg/m2  Wt:   Wt Readings from Last 2 Encounters:   03/11/18 44 kg (97 lb)   07/06/17 57 kg (125 lb 10.6 oz)      Constitutional: cooperative, pleasant, not dyspneic/diaphoretic, no acute distress  Eyes: Sclera anicteric/injected  Ears/nose/mouth/throat: Normal oropharynx without ulcers or exudate, mucus membranes moist, hearing intact  Neck: supple, thyroid normal size  CV: No edema  Respiratory: Unlabored breathing  Lymph: No axillary, submandibular, supraclavicular or inguinal lymphadenopathy  Abd:  Nondistended, +bs, no hepatosplenomegaly, nontender, no peritoneal signs, PEG in placed no drainage, gastric pacemaker also palpated in the LLQ.  Skin: warm, perfused, no jaundice  Neuro: AAO x 3, No asterixis  Psych: Normal affect  MSK: No gross deformities         Data:   Labs and imaging below were independently reviewed and interpreted    BMP  Recent Labs  Lab 03/12/18  0725 03/12/18  0342 03/11/18  2336 03/11/18  1949    142 141 134   POTASSIUM 3.2* 3.1* 3.3* 3.1*   CHLORIDE 112* 114* 113* 106 "   JESSICA 8.3* 8.2* 8.2* 7.6*   CO2 19* 17* 19* 17*   BUN 4* 5* 5* 5*   CR 0.49* 0.36* 0.42* 0.43*   * 153* 118* 169*     CBC  Recent Labs  Lab 03/12/18  0725 03/11/18  1425   WBC 4.8 6.8   RBC 4.14 3.99   HGB 13.0 12.7   HCT 36.3 35.0   MCV 88 88   MCH 31.4 31.8   MCHC 35.8 36.3   RDW 14.4 14.0    166     INR  Recent Labs  Lab 03/11/18  1425   INR 1.20*     LFTsNo lab results found in last 7 days.   PANCNo lab results found in last 7 days.    Imaging:  XR Abdomen   IMPRESSION:   1. Nonobstructive bowel gas pattern.  2. Moderate colonic stool burden

## 2018-03-12 NOTE — ANESTHESIA PREPROCEDURE EVALUATION
Anesthesia Evaluation     . Pt has had prior anesthetic. Type: General    History of anesthetic complications   - PONV        ROS/MED HX    ENT/Pulmonary:  - neg pulmonary ROS     Neurologic:  - neg neurologic ROS     Cardiovascular:  - neg cardiovascular ROS       METS/Exercise Tolerance:     Hematologic:  - neg hematologic  ROS       Musculoskeletal:  - neg musculoskeletal ROS       GI/Hepatic: Comment: H/O Chronic pancreatitis s/p autoislet cell transplant with transplant failure, now FTT requiring tube feeds    (+) GERD Symptomatic,      (-) liver disease   Renal/Genitourinary:         Endo:     (+) type II DM Using insulin .      Psychiatric:         Infectious Disease:  - neg infectious disease ROS       Malignancy:      - no malignancy   Other:    - neg other ROS                 Physical Exam  Normal systems: cardiovascular, pulmonary and dental    Airway   Mallampati: I  TM distance: >3 FB  Neck ROM: full    Dental     Cardiovascular       Pulmonary                     Anesthesia Plan      History & Physical Review  History and physical reviewed and following examination; no interval change.    ASA Status:  3 .    NPO Status:  > 8 hours    Plan for General, RSI and ETT with Intravenous and Propofol induction. Maintenance will be TIVA.    PONV prophylaxis:  Ondansetron (or other 5HT-3)       Postoperative Care      Consents  Anesthetic plan, risks, benefits and alternatives discussed with:  Patient.  Use of blood products discussed: No .   .                          .

## 2018-03-12 NOTE — PROGRESS NOTES
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 Gastroparesis with gastric pacemaker placed in 2017. S/p TP AIT 6/28/2013.  HA1C > 12.     Cardiorespiratory: Stable   GI/Nutrition: G tube exchange for GJ tube today. TPN. Vivonex 20cc/hr. Abd XR showing large stool burden. Will start with PLE. May need gastrograffin enema. Nutrition consult. Vitamin levels ordered. Vit D def, continue supplement.  Fluid/Electrolytes: D5NS 50 cc/hr. Hypokalemia, 120 mEq replacement thus far.   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 Insulin gtt. Add insulin to TPN  Infection: No issues  Prophylaxis: PPI, Anticoagulation: low risk, patient mobile  Pain control: Oxycodone 5-10 every 4 hours prn.   Activity: Ambulate QID  Anticipated LOS/Discharge: 2-3 days    Medical Decision Making: Medium  Subsequent visit 57499 (moderate level decision making)    JUDI/Fellow/Resident Provider: Marlys Briggs PA-C     Faculty: Rajendra Cruz MD    Attestation:  Patient has not been seen and evaluated by me.   Vital signs, labs, medications and orders were reviewed.   When obtained, diagnostic images were reviewed by me and interpreted as above.    The care plan was discussed with the multidisciplinary team and I agree with the findings and plan in this note, with any differences recorded in blue.            Rajendra Cruz MD  Department of Surgery    __________________________________________________________________  Transplant History: Admitted 3/11/2018 for failure to thrive, malabsorption, uncontrolled diabetes  6/28/2013 (Is[facility]), Postoperative day: 1718     Interval History: History is obtained from the patient  Overnight events: Having multiple stools per day, small amounts. Report 2 BMs today, soft.  "Nausea. Mild abdominal pain.    ROS:   A 10-point review of systems was negative except as noted above.    Curent Meds:    [Auto Hold] pantoprazole (PROTONIX) IV  40 mg Intravenous Q24H     [Auto Hold] rantidine  150 mg Oral BID     [Auto Hold] mirtazapine  7.5 mg Orally disintegrating tablet At Bedtime     [Auto Hold] potassium chloride  20 mEq Intravenous Q2H     [Auto Hold] sodium chloride (PF)  10 mL Intracatheter Q7 Days     pink lady enema  286 mL Rectal Once     [Auto Hold] sodium chloride (PF)  3 mL Intracatheter Q8H     [Auto Hold] amitriptyline  100 mg Oral At Bedtime     [Auto Hold] multivitamin CF formula  1 mL Per Feeding Tube Daily     [Auto Hold] DULoxetine  20 mg Oral BID     [Auto Hold] cholecalciferol  3,000 Units Oral Daily    And     [Auto Hold] cholecalciferol  5,000 Units Oral Daily     [Auto Hold] potassium & sodium phosphates  2 packet Per G Tube Q6H       Physical Exam:     Admit Weight: 44 kg (97 lb)    Current Vitals:   /74  Pulse 88  Temp 97.1  F (36.2  C) (Axillary)  Resp 12  Ht 1.575 m (5' 2\")  Wt 44 kg (97 lb)  SpO2 100%  BMI 17.74 kg/m2         Vital sign ranges:    Temp:  [97.1  F (36.2  C)-99.3  F (37.4  C)] 97.1  F (36.2  C)  Pulse:  [] 88  Heart Rate:  [] 106  Resp:  [12-16] 12  BP: ()/(68-83) 119/74  SpO2:  [96 %-100 %] 100 %  Patient Vitals for the past 24 hrs:   BP Temp Temp src Pulse Heart Rate Resp SpO2   03/12/18 1445 119/74 97.1  F (36.2  C) Axillary - 106 12 100 %   03/12/18 1430 121/83 - - - 99 12 100 %   03/12/18 1415 115/73 - - - 112 12 100 %   03/12/18 1410 121/81 - - - 111 12 100 %   03/12/18 0740 99/74 99.1  F (37.3  C) - 88 - 16 96 %   03/12/18 0333 113/72 99.3  F (37.4  C) Oral 86 - 16 98 %   03/11/18 2304 102/69 99  F (37.2  C) Oral 97 - 16 99 %   03/11/18 2038 111/77 - - 101 - - -   03/11/18 1920 97/68 98  F (36.7  C) Oral 93 - 16 98 %   03/11/18 1529 112/69 98.7  F (37.1  C) Oral 102 - 16 98 %     General Appearance: in no apparent " distress.   Skin: normal, dry, no rash  Heart: well perfused  Lungs: NLB on RA  Abdomen: soft, non distended, non tender. G tube.   : winchester is not present.    Extremities: edema: absent. Ext NT  PICC line    Data:   CMP  Recent Labs  Lab 03/12/18  1117 03/12/18  0725  03/11/18  1425    141  < > 133   POTASSIUM 2.8* 3.2*  < > 2.7*   CHLORIDE 118* 112*  < > 102   CO2 12* 19*  < > 15*   GLC 91 119*  < > 368*   BUN 4* 4*  < > 6*   CR 0.31* 0.49*  < > 0.46*   GFRESTIMATED >90 >90  < > >90   GFRESTBLACK >90 >90  < > >90   JESSICA 7.0* 8.3*  < > 7.7*   MAG  --  1.6  --  1.7   PHOS  --  3.6  --  2.4*   < > = values in this interval not displayed.  CBC  Recent Labs  Lab 03/12/18  0725 03/11/18  1425   HGB 13.0 12.7   WBC 4.8 6.8    166   A1C  --  15.9*     Coags  Recent Labs  Lab 03/11/18  1425   INR 1.20*   PTT 23      Urinalysis  Recent Labs   Lab Test  06/16/17   1115  06/15/17   1132   COLOR  Straw  Yellow   APPEARANCE  Clear  Slightly Cloudy   URINEGLC  >1000*  >1000*   URINEBILI  Negative  Negative   URINEKETONE  Negative  >150*   SG  1.000*  1.024   UBLD  Negative  Trace*   URINEPH  5.0  5.5   PROTEIN  Negative  30*   NITRITE  Negative  Negative   LEUKEST  Negative  Negative   RBCU  <1  8*   WBCU  1  2

## 2018-03-12 NOTE — ANESTHESIA CARE TRANSFER NOTE
Patient: Beverly Elena    Procedure(s):  Percutaneous endoscopic gastrostomy to jejunostomy tube - Wound Class: I-Clean    Diagnosis: Failure to thrive  Diagnosis Additional Information: No value filed.    Anesthesia Type:   General, RSI, ETT     Note:  Airway :Face Mask  Patient transferred to:PACU  Comments: To PACU on supplemental O2 with + air exchange, placed on monitor. Report given to RN, questions answered, VSS, patient alert and comfortable.Handoff Report: Identifed the Patient, Identified the Reponsible Provider, Reviewed the pertinent medical history, Discussed the surgical course, Reviewed Intra-OP anesthesia mangement and issues during anesthesia, Set expectations for post-procedure period and Allowed opportunity for questions and acknowledgement of understanding      Vitals: (Last set prior to Anesthesia Care Transfer)    CRNA VITALS  3/12/2018 1338 - 3/12/2018 1408      3/12/2018             Resp Rate (observed): (!)  1                Electronically Signed By: HERNESTO Esparza CRNA  March 12, 2018  2:08 PM

## 2018-03-12 NOTE — ANESTHESIA POSTPROCEDURE EVALUATION
Patient: Beverly Elena    Procedure(s):  Percutaneous endoscopic gastrostomy to jejunostomy tube - Wound Class: I-Clean    Diagnosis:Failure to thrive  Diagnosis Additional Information: No value filed.    Anesthesia Type:  General, RSI, ETT    Note:  Anesthesia Post Evaluation    Patient location during evaluation: PACU  Patient participation: Able to fully participate in evaluation  Level of consciousness: awake and alert  Pain management: adequate  Airway patency: patent  Cardiovascular status: acceptable  Respiratory status: acceptable  Hydration status: acceptable  PONV: none     Anesthetic complications: None          Last vitals:  Vitals:    03/12/18 1430 03/12/18 1445 03/12/18 1500   BP: 121/83 119/74 117/76   Pulse:      Resp: 12 12 12   Temp:  36.2  C (97.1  F)    SpO2: 100% 100% 100%         Electronically Signed By: Leonardo Winston MD  March 12, 2018  3:12 PM

## 2018-03-12 NOTE — PLAN OF CARE
Problem: Patient Care Overview  Goal: Plan of Care/Patient Progress Review  [id number]    VS VSS, slightly tachy post-procedure, on RA, on Capno     IV 2 PIVs SL, PICC line placed today, will start TPN + lipids tonight, D5NS@ 50mL/hr + insulin gtt infusing     Labs K+ 3.1 and 2.8 replacing w/ 4 doses of 20mEq/50mL, conditional orders to recheck K+ after K+ replacement is complete (last K+ dose will be at 2000). BGs , alg1-alg2    Pain ab aching, relieved w/ oxycodone x1    Neuro WNL    Resp WNL    Cardiovascular WNL    GI G tube exchanged for G/J albertina, G tube clamped, vivonex @ 20mL/hr is infusing through J tube. Orders to hold TFs at 0200 for possible EGD tomorrow. NPO @ 0000. C/o intermittent nausea, zofran partially effective, compazine and reglan available. Stool sample sent for c diff testing, unable to test d/t stool being formed. Pt had two stools, fatty, latest stool reported to be loose. Pink lady enema ordered by Dr. Cruz. Pt is hesitant to take but agreeable now. Enema is indicated d/t xrays from yesterday showing stool in colon.      WNL    Mobility Up ad nory     Tests/ Procedures NPO @ 0000, TFs off at 0200 for possible EGD tomorrow. PICC and albertina exchanged today.

## 2018-03-12 NOTE — PLAN OF CARE
Problem: Patient Care Overview  Goal: Plan of Care/Patient Progress Review  PT 7A: Cx, pt unavailable x2 due to procedures. Per RN may not have PT needs, is up IND in room, PT yet needs to meet with pt to discuss if concerns present. If team has met with pt and feels PT no longer needed please discontinue orders.

## 2018-03-12 NOTE — PLAN OF CARE
"Problem: Patient Care Overview  Goal: Plan of Care/Patient Progress Review  Outcome: No Change  /77 (BP Location: Left arm)  Pulse 101  Temp 98  F (36.7  C) (Oral)  Resp 16  Ht 1.575 m (5' 2\")  Wt 44 kg (97 lb)  SpO2 98%  BMI 17.74 kg/m2.  BG range: 181-367 with insulin gtt per algorithm 2; BG checked hourly.  Pt. states abdominal pain decreased with Oxycodone x1.  Nausea controlled with Zofran & Compazine IV each x1 with no emesis.  Pt. up ad nory.  Voided spontaneously, 1 large formed stool.  Tube feeds at 10cc/hour via GT & will increase to 20cc/hour if tolerating per orders.  K+ 3.1 after receiving KCL 10meq IV x3 with 3 more doses to give.  MD Walker notified of K+ level & ordered to notify if level not WNL with next level.  D5NS @ 50cc/hour via PIV.  Admission profile & med. rec. complete.  Will continue to follow POC & notify MD with changes.        "

## 2018-03-12 NOTE — PLAN OF CARE
Problem: Patient Care Overview  Goal: Plan of Care/Patient Progress Review  Outcome: No Change  Tmax: 99.3 (o), OVSS, satting on RA.  BG range: , insulin gtt at 4units/hour per algorithm 2.  Pt. had no c/o's pain or nausea.  Tube feeds at goal rate of 20cc/hour via PEG tube.  Free water 30cc q 4 hours.  MIVF at 50cc/hour. Pt. up ad nory.  Voiding spontaneously, 1 large formed stool. K+ 3.1 & ordered for KCL 10meq IV x4 doses.  BMP checked q 4 hours per order.  GI consult ordered, PEG tube to be replaced with G/J tube today &  possible PICC placement today.  Pt. slept well between cares.  Continue to follow POC & notify MD with with changes.

## 2018-03-12 NOTE — BRIEF OP NOTE
[facility] Brief Operative Note    Pre-operative diagnosis: Failure to thrive   Post-operative diagnosis * No post-op diagnosis entered *   Procedure: Procedure(s):  Percutaneous endoscopic gastrostomy to jejunostomy tube - Wound Class: I-Clean   Surgeon: Guru Angel MD       Estimated blood loss: None    Specimens: None   Findings:    Previous G-tube was removed  Jejunum appeared edematous  A new 18 Fr by 43 cm low profile MARTINEZ key tube was placed    Recommendations  GJ tube can be used this evening

## 2018-03-12 NOTE — PROVIDER NOTIFICATION
Notified MD at 2050 PM regarding lab results.      Spoke with: RYAN Walker MD    Orders were not obtained.    Comments:  MD notified of pt's K+ 3.1 after receiving KCL 10meq IV x3 with 3 more doses to give along with sched. Neutra Phos packet.  Ordered to notify with next K+ level if not WNL.

## 2018-03-13 ENCOUNTER — APPOINTMENT (OUTPATIENT)
Dept: GENERAL RADIOLOGY | Facility: CLINIC | Age: 43
DRG: 391 | End: 2018-03-13
Attending: NURSE PRACTITIONER
Payer: COMMERCIAL

## 2018-03-13 LAB
ALBUMIN SERPL-MCNC: 2.4 G/DL (ref 3.4–5)
ALP SERPL-CCNC: 124 U/L (ref 40–150)
ALT SERPL W P-5'-P-CCNC: 43 U/L (ref 0–50)
ANION GAP SERPL CALCULATED.3IONS-SCNC: 9 MMOL/L (ref 3–14)
AST SERPL W P-5'-P-CCNC: 49 U/L (ref 0–45)
BASOPHILS # BLD AUTO: 0 10E9/L (ref 0–0.2)
BASOPHILS NFR BLD AUTO: 0.3 %
BILIRUB SERPL-MCNC: 0.4 MG/DL (ref 0.2–1.3)
BUN SERPL-MCNC: 8 MG/DL (ref 7–30)
CALCIUM SERPL-MCNC: 7.9 MG/DL (ref 8.5–10.1)
CHLORIDE SERPL-SCNC: 112 MMOL/L (ref 94–109)
CO2 SERPL-SCNC: 22 MMOL/L (ref 20–32)
CREAT SERPL-MCNC: 0.42 MG/DL (ref 0.52–1.04)
DIFFERENTIAL METHOD BLD: ABNORMAL
EOSINOPHIL # BLD AUTO: 0 10E9/L (ref 0–0.7)
EOSINOPHIL NFR BLD AUTO: 0.6 %
ERYTHROCYTE [DISTWIDTH] IN BLOOD BY AUTOMATED COUNT: 14.7 % (ref 10–15)
GFR SERPL CREATININE-BSD FRML MDRD: >90 ML/MIN/1.7M2
GLUCOSE BLDC GLUCOMTR-MCNC: 107 MG/DL (ref 70–99)
GLUCOSE BLDC GLUCOMTR-MCNC: 114 MG/DL (ref 70–99)
GLUCOSE BLDC GLUCOMTR-MCNC: 127 MG/DL (ref 70–99)
GLUCOSE BLDC GLUCOMTR-MCNC: 128 MG/DL (ref 70–99)
GLUCOSE BLDC GLUCOMTR-MCNC: 135 MG/DL (ref 70–99)
GLUCOSE BLDC GLUCOMTR-MCNC: 137 MG/DL (ref 70–99)
GLUCOSE BLDC GLUCOMTR-MCNC: 137 MG/DL (ref 70–99)
GLUCOSE BLDC GLUCOMTR-MCNC: 140 MG/DL (ref 70–99)
GLUCOSE BLDC GLUCOMTR-MCNC: 140 MG/DL (ref 70–99)
GLUCOSE BLDC GLUCOMTR-MCNC: 141 MG/DL (ref 70–99)
GLUCOSE BLDC GLUCOMTR-MCNC: 146 MG/DL (ref 70–99)
GLUCOSE BLDC GLUCOMTR-MCNC: 150 MG/DL (ref 70–99)
GLUCOSE BLDC GLUCOMTR-MCNC: 155 MG/DL (ref 70–99)
GLUCOSE BLDC GLUCOMTR-MCNC: 159 MG/DL (ref 70–99)
GLUCOSE BLDC GLUCOMTR-MCNC: 160 MG/DL (ref 70–99)
GLUCOSE BLDC GLUCOMTR-MCNC: 161 MG/DL (ref 70–99)
GLUCOSE BLDC GLUCOMTR-MCNC: 184 MG/DL (ref 70–99)
GLUCOSE BLDC GLUCOMTR-MCNC: 190 MG/DL (ref 70–99)
GLUCOSE BLDC GLUCOMTR-MCNC: 303 MG/DL (ref 70–99)
GLUCOSE BLDC GLUCOMTR-MCNC: 303 MG/DL (ref 70–99)
GLUCOSE BLDC GLUCOMTR-MCNC: 347 MG/DL (ref 70–99)
GLUCOSE BLDC GLUCOMTR-MCNC: 407 MG/DL (ref 70–99)
GLUCOSE BLDC GLUCOMTR-MCNC: 95 MG/DL (ref 70–99)
GLUCOSE SERPL-MCNC: 135 MG/DL (ref 70–99)
HCT VFR BLD AUTO: 30.7 % (ref 35–47)
HGB BLD-MCNC: 10.6 G/DL (ref 11.7–15.7)
IMM GRANULOCYTES # BLD: 0 10E9/L (ref 0–0.4)
IMM GRANULOCYTES NFR BLD: 0.3 %
INR PPP: 1.34 (ref 0.86–1.14)
LYMPHOCYTES # BLD AUTO: 1.6 10E9/L (ref 0.8–5.3)
LYMPHOCYTES NFR BLD AUTO: 47.2 %
MAGNESIUM SERPL-MCNC: 1.8 MG/DL (ref 1.6–2.3)
MCH RBC QN AUTO: 31.3 PG (ref 26.5–33)
MCHC RBC AUTO-ENTMCNC: 34.5 G/DL (ref 31.5–36.5)
MCV RBC AUTO: 91 FL (ref 78–100)
MONOCYTES # BLD AUTO: 0.3 10E9/L (ref 0–1.3)
MONOCYTES NFR BLD AUTO: 8.6 %
NEUTROPHILS # BLD AUTO: 1.5 10E9/L (ref 1.6–8.3)
NEUTROPHILS NFR BLD AUTO: 43 %
NRBC # BLD AUTO: 0 10*3/UL
NRBC BLD AUTO-RTO: 0 /100
PHOSPHATE SERPL-MCNC: 5.2 MG/DL (ref 2.5–4.5)
PLATELET # BLD AUTO: 92 10E9/L (ref 150–450)
POTASSIUM SERPL-SCNC: 3.4 MMOL/L (ref 3.4–5.3)
POTASSIUM SERPL-SCNC: 3.6 MMOL/L (ref 3.4–5.3)
POTASSIUM SERPL-SCNC: 3.8 MMOL/L (ref 3.4–5.3)
PROT SERPL-MCNC: 4.7 G/DL (ref 6.8–8.8)
RBC # BLD AUTO: 3.39 10E12/L (ref 3.8–5.2)
SODIUM SERPL-SCNC: 143 MMOL/L (ref 133–144)
UPPER GI ENDOSCOPY: NORMAL
WBC # BLD AUTO: 3.4 10E9/L (ref 4–11)

## 2018-03-13 PROCEDURE — 25000132 ZZH RX MED GY IP 250 OP 250 PS 637: Performed by: NURSE PRACTITIONER

## 2018-03-13 PROCEDURE — 25000128 H RX IP 250 OP 636: Performed by: PHYSICIAN ASSISTANT

## 2018-03-13 PROCEDURE — 00000146 ZZHCL STATISTIC GLUCOSE BY METER IP

## 2018-03-13 PROCEDURE — 36592 COLLECT BLOOD FROM PICC: CPT | Performed by: STUDENT IN AN ORGANIZED HEALTH CARE EDUCATION/TRAINING PROGRAM

## 2018-03-13 PROCEDURE — 25000125 ZZHC RX 250: Performed by: SURGERY

## 2018-03-13 PROCEDURE — 12000024 ZZH R&B TRANSPLANT CRITICAL

## 2018-03-13 PROCEDURE — 84132 ASSAY OF SERUM POTASSIUM: CPT | Performed by: NURSE PRACTITIONER

## 2018-03-13 PROCEDURE — 84132 ASSAY OF SERUM POTASSIUM: CPT | Performed by: PHYSICIAN ASSISTANT

## 2018-03-13 PROCEDURE — 84132 ASSAY OF SERUM POTASSIUM: CPT | Performed by: STUDENT IN AN ORGANIZED HEALTH CARE EDUCATION/TRAINING PROGRAM

## 2018-03-13 PROCEDURE — 25000132 ZZH RX MED GY IP 250 OP 250 PS 637: Performed by: PHYSICIAN ASSISTANT

## 2018-03-13 PROCEDURE — 87338 HPYLORI STOOL AG IA: CPT | Performed by: NURSE PRACTITIONER

## 2018-03-13 PROCEDURE — 25000125 ZZHC RX 250: Performed by: PHYSICIAN ASSISTANT

## 2018-03-13 PROCEDURE — 27210443 ZZH NUTRITION PRODUCT SPECIALIZED PACKET

## 2018-03-13 PROCEDURE — 85025 COMPLETE CBC W/AUTO DIFF WBC: CPT | Performed by: PHYSICIAN ASSISTANT

## 2018-03-13 PROCEDURE — 83735 ASSAY OF MAGNESIUM: CPT | Performed by: PHYSICIAN ASSISTANT

## 2018-03-13 PROCEDURE — 74018 RADEX ABDOMEN 1 VIEW: CPT

## 2018-03-13 PROCEDURE — 80053 COMPREHEN METABOLIC PANEL: CPT | Performed by: PHYSICIAN ASSISTANT

## 2018-03-13 PROCEDURE — 36592 COLLECT BLOOD FROM PICC: CPT | Performed by: NURSE PRACTITIONER

## 2018-03-13 PROCEDURE — 25000132 ZZH RX MED GY IP 250 OP 250 PS 637: Performed by: SURGERY

## 2018-03-13 PROCEDURE — 40000558 ZZH STATISTIC CVC DRESSING CHANGE

## 2018-03-13 PROCEDURE — 40000802 ZZH SITE CHECK

## 2018-03-13 PROCEDURE — 36592 COLLECT BLOOD FROM PICC: CPT | Performed by: PHYSICIAN ASSISTANT

## 2018-03-13 PROCEDURE — 84100 ASSAY OF PHOSPHORUS: CPT | Performed by: PHYSICIAN ASSISTANT

## 2018-03-13 PROCEDURE — 85610 PROTHROMBIN TIME: CPT | Performed by: PHYSICIAN ASSISTANT

## 2018-03-13 RX ORDER — POLYETHYLENE GLYCOL 3350 17 G/17G
8.5 POWDER, FOR SOLUTION ORAL 2 TIMES DAILY
Status: DISCONTINUED | OUTPATIENT
Start: 2018-03-13 | End: 2018-03-18 | Stop reason: HOSPADM

## 2018-03-13 RX ORDER — POTASSIUM CHLORIDE 29.8 MG/ML
20 INJECTION INTRAVENOUS ONCE
Status: COMPLETED | OUTPATIENT
Start: 2018-03-13 | End: 2018-03-13

## 2018-03-13 RX ORDER — DULOXETIN HYDROCHLORIDE 30 MG/1
30 CAPSULE, DELAYED RELEASE ORAL 2 TIMES DAILY
Status: DISCONTINUED | OUTPATIENT
Start: 2018-03-13 | End: 2018-03-18 | Stop reason: HOSPADM

## 2018-03-13 RX ADMIN — VITAMIN D, TAB 1000IU (100/BT) 3000 UNITS: 25 TAB at 08:52

## 2018-03-13 RX ADMIN — Medication 7.5 MG: at 21:09

## 2018-03-13 RX ADMIN — POLYETHYLENE GLYCOL 3350 8.5 G: 17 POWDER, FOR SOLUTION ORAL at 20:09

## 2018-03-13 RX ADMIN — POTASSIUM CHLORIDE 20 MEQ: 400 INJECTION, SOLUTION INTRAVENOUS at 17:26

## 2018-03-13 RX ADMIN — PANTOPRAZOLE SODIUM 40 MG: 40 INJECTION, POWDER, FOR SOLUTION INTRAVENOUS at 08:53

## 2018-03-13 RX ADMIN — ONDANSETRON 4 MG: 2 INJECTION INTRAMUSCULAR; INTRAVENOUS at 20:09

## 2018-03-13 RX ADMIN — ONDANSETRON 4 MG: 2 INJECTION INTRAMUSCULAR; INTRAVENOUS at 09:29

## 2018-03-13 RX ADMIN — AMITRIPTYLINE HYDROCHLORIDE 100 MG: 50 TABLET, FILM COATED ORAL at 21:06

## 2018-03-13 RX ADMIN — Medication 1 ML: at 08:53

## 2018-03-13 RX ADMIN — PROCHLORPERAZINE EDISYLATE 10 MG: 5 INJECTION INTRAMUSCULAR; INTRAVENOUS at 14:25

## 2018-03-13 RX ADMIN — I.V. FAT EMULSION 250 ML: 20 EMULSION INTRAVENOUS at 20:06

## 2018-03-13 RX ADMIN — HUMAN INSULIN 12 UNITS/HR: 100 INJECTION, SOLUTION SUBCUTANEOUS at 15:04

## 2018-03-13 RX ADMIN — DULOXETINE HYDROCHLORIDE 30 MG: 30 CAPSULE, DELAYED RELEASE ORAL at 21:06

## 2018-03-13 RX ADMIN — VITAMIN D, TAB 1000IU (100/BT) 5000 UNITS: 25 TAB at 08:53

## 2018-03-13 RX ADMIN — OXYCODONE HYDROCHLORIDE 10 MG: 5 SOLUTION ORAL at 21:06

## 2018-03-13 RX ADMIN — ACETAMINOPHEN 650 MG: 160 LIQUID ORAL at 12:38

## 2018-03-13 RX ADMIN — MAGNESIUM SULFATE HEPTAHYDRATE: 500 INJECTION, SOLUTION INTRAMUSCULAR; INTRAVENOUS at 20:07

## 2018-03-13 RX ADMIN — POTASSIUM & SODIUM PHOSPHATES POWDER PACK 280-160-250 MG 2 PACKET: 280-160-250 PACK at 05:24

## 2018-03-13 RX ADMIN — DULOXETINE 20 MG: 20 CAPSULE, DELAYED RELEASE ORAL at 08:53

## 2018-03-13 NOTE — PROGRESS NOTES
Gastroenterology Inpatient Sign Off Note    Inpatient GI consults service will sign off. No further recommendations at this time. If primary team has addition questions, please page consult fellow listed in [facility].    Current GI Consult Staff: Angel      Follow up recommendations:   No outpatient GI clinic follow-up indicated. Follow-up with primary care provider at timing determined by discharge physician.  No esophagitis seen. Please call GI if GJ not working appropriately and we will proceed with putting another one.      Patient discussed with Dr. Angel Chance  GI Fellow

## 2018-03-13 NOTE — PROGRESS NOTES
CLINICAL NUTRITION SERVICES - REASSESSMENT NOTE     Nutrition Prescription    RECOMMENDATIONS FOR MDs/PROVIDERS TO ORDER:  Recommend evaluation/treatment for SIBO, which can also cause or exacerbate steatorrhea    Malnutrition Status:    Severe malnutrition in the context of chronic illness    Recommendations already ordered by Registered Dietitian (RD):    Removed orange oil allergy per patient request    Future/Additional Recommendations:  For home - recommend transition to Vivonex RTF formula (10% kcal from fat to avoid EFA deficiency) @ 60 ml/hr (if rate can be tolerated).  Mix 1 Creon 12 capsule dissolved in sodium bicarbonate every 4 hours into TF.  This would meet 100% of her kcal/protein goals.     Liquid or chewable vitamins at discharge for better absorption -> Either switch to MVW chewables or use MEHDI plus/AquADEK liquids.  Amounts pending vitamin levels checked 3/12/18.      Ensure vitamin supplements are taken with food and enzymes.      Consider increasing PPI to possibly increase efficacy of enzymes (enzymes denature in excessively acidic environment). Ensure enzymes are being stored in an appropriate environment (dentaure in excessively hot/cold environments).      Continue low fat/low fiber diet for gastroparesis and to avoid exacerbating steatorrhea     EVALUATION OF THE PROGRESS TOWARD GOALS   Diet: Clear Liquid    TF: Vivonex TEN (3% kcal from fat) @ 10 ml/hr (was off for possible EGD and re-started at 10 ml/hr, ordered at 20 ml/hr) -> When at 20 ml/hr receives 480 kcal,     TPN (started 3/12): 1080 ml (45 ml/hr) - Dex 100 grams, AA 65 grams + 250 ml IV lipids 5x/week = 957 kcal (22 kcal/kg), 1.5 g/kg protein daily    PO Intake: Pt consumed a large clear liquid meal (beef broth x 2, strawberry jello x 2, 8 oz apple juice.  She was complaining of some nausea afterwards.  Wanted her TF only at 10 ml/hr d/t this per RN.      Current vitamins: AquADEK 1 ml daily + 8000 IU Vitamin D3     NEW FINDINGS    *Patient reports taking the following since July:  MVW (CF vitamins) - 2 capsules (1 in morning, 1 in evening - contains 16,000 IU Vitamin A, 1,500 IU Vitamin D, 200 IU Vitamin E, 800 mcg Vitamin K ) - she reports she does take them with food and enzymes + 0.75 ml vitamin D3 solution (she thinks it is 8000 IU but not sure) + hair, skin, nails gummies (4 daily - contain biotin, vitamin E (~30 IU) and vitamin C)    *Vitamin levels (3/12) pending except for vitamin B12 which was normal at 598. Hgb A1C    *Patient reports she takes the following (since July)  Has tried Zenpep, Pertzye, Viokace, enzymes from whole foods, enzymes ordered online (not FDA approved).  Notes she tried a combination of Viokace and Creon and her diarrhea was worse.  So she went back to taking Creon, 3 capsules 30 minutes prior to eating and 3 capsules after eating.  She reports eating a simple CHO diet mainly and has 30 loose stools/day.      *She reports using Vivonex RTF with good tolerance in the past, however notes she was unable to get past 20-30 ml/hr d/t abdominal pain/cramping back in 2015.  She was dissolving Viokace with TF at the time as well, but it was clogging the tube/pump frequently.      *PICC placed and G tube converted to G/J on 3/12.     *Of note - from RD note on 7/7/17 there was concern that patient was purposefully overriding boluses (per pump records) which would be reason for elevated BG.  Patient was educated that high BG leads to weight loss and this could explain some of patient's weight loss.     MALNUTRITION  % Intake: </=50% for >/= 1 month (severe)  % Weight Loss: > 20% in 1 year (severe)  Subcutaneous Fat Loss: Facial region, Upper arm, Lower arm and Thoracic/intercostal: moderate - per previous  Muscle Loss: Thoracic region (clavicle, acromium bone, deltoid, trapezius, pectoral), Upper arm (bicep, tricep):, Lower arm  (forearm), Upper leg (quadricep, hamstring) and Posterior calf: severe - per previous  Fluid  Accumulation/Edema: Mild (swelling in legs) - per previous  Malnutrition Diagnosis: Severe malnutrition in the context of chronic illness    Previous Goals   Pt to tolerate nutrition support (EN + PN) so that total avg nutritional intake to meet a minimum of 30 kcal/kg and 1.5 g PRO/kg daily (per dosing wt 44 kg).  Evaluation: Met    Previous Nutrition Diagnosis  Altered GI function  Evaluation: No change    CURRENT NUTRITION DIAGNOSIS  Malnutrition related to steatorrhea as evidenced by severe malnutrition related to degree of weight loss and poor oral intake per malnutrition guidelines.     INTERVENTIONS  Implementation  Nutrition education for recommended modifications -> Discussed recommendations as listed above.    Discussed TF/TPN with NP, PharmD.  Waiting for staff MD to make decision re: TF rate.   Discussed TF rate with RN, requested advancement to 20 ml/hr soon.      Goals  Total avg nutritional intake to meet a minimum of 30 kcal/kg and 1.5 g PRO/kg daily (per dosing wt 44 kg).    Monitoring/Evaluation  Progress toward goals will be monitored and evaluated per protocol.    Nathalie Beasley MS, RD, LD  Pager [phone]

## 2018-03-13 NOTE — PROGRESS NOTES
"[id number]: VSS on RA. Capno refused by patient, O2 high 90's on RA. A&Ox4. Pain controlled with PRN Oxycodone 5mg given x2. PRN compazine 10mg given for nausea, effective. Patient reported 2 large loose BMs, were not witnessed by nurse or NST. Patient refused pink lady enema, stated she might take tomorrow AM. Left in patient bin. Insulin gtt on algorithm 1, BGs 348, 288, 249, 192. D5 infusing at 50cc/hr. Patient to be NPO at midnight for possible EGD tomorrow. TF at goal of 20cc/hr. Ordered to be turned off at 0200. Patient aware. TPN infusing continuously at 45cc/hr and lipids at 20.8cc/hr through double lumen PICC. Last bag of K+ 20mEq IV replacement protocol infusing. Recheck 2 hours after completion and with AM labs. Patient aware. Up ad nory, voiding independently, states \"there's no need to save my urine anymore\". Will continue to monitor and follow POC.   "

## 2018-03-13 NOTE — PROGRESS NOTES
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 Gastroparesis with gastric pacemaker placed in 2017. S/p TP AIT 6/28/2013.      Cardiorespiratory: Stable   GI/Nutrition:   Severe Malnutrition/malabsorption:  3/12 G tube exchanged for GJ tube today. TPN bridge. Vivonex 20cc/hr, restart today. Will discuss advancement with staff.  Abd pain:  3/12 scope for FT replacement:  Small bowel appeared diffusely edematous and friable, no ulcers, no esophagitis.  Will contact GI fellow about SB findings.  Constipation:  3/11 Abd XR showed large stool burden. Pt declined enema, reports BM x5.   R/o vitamin deficiency:  Vitamin levels ordered. Vit D def, continue supplement.  Heme:  Pancytopenia on labs this morning.  Likely reflects hemodilution.  Recheck tomorrow.  Fluid/Electrolytes: D5NS 50 cc/hr. Hypokalemia, K 3.8 this morning, recheck pending.  : no w[place]  Post-pancreatectomy diabetes: Uncontrolled.  HA1C 15.9. Insulin gtt. Also, 5u insulin in TPN.  Infection: No issues  Prophylaxis: PPI, Anticoagulation: low risk, patient mobile  Pain control: Oxycodone 5-10 every 4 hours prn.   Activity: Ambulate QID  Anticipated LOS/Discharge: 2-3 days    Medical Decision Making: Medium  Subsequent visit 00068 (moderate level decision making)    JUDI/Fellow/Resident Provider: Pooja Sapp NP [id number]    Faculty: Rajendra Cruz MD  Attestation:  Patient has been seen and evaluated by me.   Vital signs, labs, medications and orders were reviewed.   When obtained, diagnostic images were reviewed by me and interpreted as above.    The care plan was discussed with the multidisciplinary team and I agree with the findings and plan in this note, with any differences recorded in blue.            Rajendra Cruz  "MD  Department of Surgery      __________________________________________________________________  Transplant History: Admitted 3/11/2018 for failure to thrive, malabsorption, uncontrolled diabetes  6/28/2013 ([facility]), Postoperative day: 1719     Interval History: History is obtained from the patient  Overnight events:   Pt reports loose, floating stools.  Declined enema yesterday.  Left sided HA today, requests APAP.  Cramping abd pain in mid-abdomen.  Baseline nausea.  TF on hold until mid-morning for possible EGD, GI reports that they were able to examine during GJ placement, no EGD today.    ROS:   A 10-point review of systems was negative except as noted above.    Curent Meds:    DULoxetine  30 mg Oral BID     pantoprazole (PROTONIX) IV  40 mg Intravenous Q24H     mirtazapine  7.5 mg Orally disintegrating tablet At Bedtime     sodium chloride (PF)  10 mL Intracatheter Q7 Days     lipids  250 mL Intravenous Once per day on Mon Tue Wed Thu Fri     sodium chloride (PF)  3 mL Intracatheter Q8H     amitriptyline  100 mg Oral At Bedtime     multivitamin CF formula  1 mL Per Feeding Tube Daily     cholecalciferol  3,000 Units Oral Daily    And     cholecalciferol  5,000 Units Oral Daily       Physical Exam:     Admit Weight: 44 kg (97 lb)    Current Vitals:   /86 (BP Location: Right arm)  Pulse 87  Temp 99.1  F (37.3  C) (Oral)  Resp 16  Ht 1.575 m (5' 2\")  Wt 44 kg (97 lb)  SpO2 100%  BMI 17.74 kg/m2    Vital sign ranges:    Temp:  [97.1  F (36.2  C)-99.1  F (37.3  C)] 99.1  F (37.3  C)  Pulse:  [87] 87  Heart Rate:  [] 122  Resp:  [12-16] 16  BP: ()/(56-86) 126/86  SpO2:  [98 %-100 %] 100 %  Patient Vitals for the past 24 hrs:   BP Temp Temp src Pulse Heart Rate Resp SpO2   03/13/18 1215 126/86 99.1  F (37.3  C) Oral - 122 16 100 %   03/13/18 0756 111/72 99  F (37.2  C) Oral - 101 16 99 %   03/13/18 0423 99/58 - - - - - -   03/13/18 0215 102/68 - - - - - -   03/13/18 0037 (!) 88/56 97.9  F " (36.6  C) Oral 87 - 16 100 %   03/12/18 1600 - - - - - - 98 %   03/12/18 1530 108/70 98  F (36.7  C) Oral - 108 16 100 %   03/12/18 1515 117/73 - - - 105 14 100 %   03/12/18 1500 117/76 - - - 94 12 100 %   03/12/18 1445 119/74 97.1  F (36.2  C) Axillary - 106 12 100 %     General Appearance: in no apparent distress.   Skin: normal, dry, no rash  Heart: well perfused  Lungs: NLB on RA  Abdomen: soft, rounded, non tender. G tube.   : winchester is not present.    Extremities: edema: absent.   PICC line    Data:   CMP    Recent Labs  Lab 03/13/18  0551 03/13/18  0234 03/12/18  1117 03/12/18  0725     --  142 141   POTASSIUM 3.8 3.6 2.8* 3.2*   CHLORIDE 112*  --  118* 112*   CO2 22  --  12* 19*   *  --  91 119*   BUN 8  --  4* 4*   CR 0.42*  --  0.31* 0.49*   GFRESTIMATED >90  --  >90 >90   GFRESTBLACK >90  --  >90 >90   JESSICA 7.9*  --  7.0* 8.3*   MAG 1.8  --   --  1.6   PHOS 5.2*  --   --  3.6   ALBUMIN 2.4*  --   --   --    BILITOTAL 0.4  --   --   --    ALKPHOS 124  --   --   --    AST 49*  --   --   --    ALT 43  --   --   --      CBC    Recent Labs  Lab 03/13/18  0551 03/12/18  0725 03/11/18  1425   HGB 10.6* 13.0 12.7   WBC 3.4* 4.8 6.8   PLT 92* 151 166   A1C  --   --  15.9*     Coags    Recent Labs  Lab 03/13/18  0551 03/11/18  1425   INR 1.34* 1.20*   PTT  --  23      Urinalysis  Recent Labs   Lab Test  06/16/17   1115  06/15/17   1132   COLOR  Straw  Yellow   APPEARANCE  Clear  Slightly Cloudy   URINEGLC  >1000*  >1000*   URINEBILI  Negative  Negative   URINEKETONE  Negative  >150*   SG  1.000*  1.024   UBLD  Negative  Trace*   URINEPH  5.0  5.5   PROTEIN  Negative  30*   NITRITE  Negative  Negative   LEUKEST  Negative  Negative   RBCU  <1  8*   WBCU  1  2

## 2018-03-13 NOTE — PLAN OF CARE
Problem: Patient Care Overview  Goal: Plan of Care/Patient Progress Review  Lowest BP 88/56, improved to 102/68, then 99/58. AOVSS on RA. Insulin drip: Algorithm 1 ranged from 161(0700)-114. Denied pain and nausea. NPO at 0000 for potential EGD today. TF turned off at 0200 for this reason. Otherwise was running at 20ml/hr with 30ml water flushes q4h. D5NS maintenance fluid running at 50ml/hr. TPN running at 45ml/hr with lipids at 20.8ml/hr. Pt not saving voids, no reported BM overnight. Up ad nory, independent. Slept fairly well between cares. Will continue to monitor and follow POC.

## 2018-03-13 NOTE — PROGRESS NOTES
Care Coordinator  D/I: Pt is currently on both enteral and TPN, I called [facility] [phone] Kristyn to check coverage--pt has [employer] plan and [facility] is Out of Network--this plan only services her in [place] ([place] would also be OON)--Hugo is trying to obtain a prior auth to use them--if they cannot get the prior auth, they will check her secondary insurance [employer].  Per Pooja Sapp NP, she thinks she will only need enteral for d/c, but needs the EGD and plan from Dr Cruz.  P: IF I have to use [employer]--[place] is a 4-5 hour drive--Wait for [facility] and prior auth. Will follow.

## 2018-03-13 NOTE — PLAN OF CARE
Problem: Patient Care Overview  Goal: Plan of Care/Patient Progress Review  PT 7A: PT holding on eval. Spoke with pt this AM, she is reporting she was unable to trial hallway walking yesterday due to multiple procedures. She is planning to walk this AM, PT will re-assess in PM to determine if PT eval needs present. Otherwise nursing notes indicate pt up ad nory in room.

## 2018-03-14 LAB
A-TOCOPHEROL VIT E SERPL-MCNC: 4.3 MG/L (ref 5.5–18)
ANION GAP SERPL CALCULATED.3IONS-SCNC: 7 MMOL/L (ref 3–14)
ANNOTATION COMMENT IMP: ABNORMAL
BASOPHILS # BLD AUTO: 0 10E9/L (ref 0–0.2)
BASOPHILS NFR BLD AUTO: 0.2 %
BETA+GAMMA TOCOPHEROL SERPL-MCNC: 0.8 MG/L (ref 0–6)
BUN SERPL-MCNC: 10 MG/DL (ref 7–30)
CALCIUM SERPL-MCNC: 8 MG/DL (ref 8.5–10.1)
CHLORIDE SERPL-SCNC: 106 MMOL/L (ref 94–109)
CO2 SERPL-SCNC: 26 MMOL/L (ref 20–32)
CREAT SERPL-MCNC: 0.39 MG/DL (ref 0.52–1.04)
DIFFERENTIAL METHOD BLD: ABNORMAL
EOSINOPHIL # BLD AUTO: 0 10E9/L (ref 0–0.7)
EOSINOPHIL NFR BLD AUTO: 0.7 %
ERYTHROCYTE [DISTWIDTH] IN BLOOD BY AUTOMATED COUNT: 14.8 % (ref 10–15)
GFR SERPL CREATININE-BSD FRML MDRD: >90 ML/MIN/1.7M2
GLUCOSE BLDC GLUCOMTR-MCNC: 100 MG/DL (ref 70–99)
GLUCOSE BLDC GLUCOMTR-MCNC: 104 MG/DL (ref 70–99)
GLUCOSE BLDC GLUCOMTR-MCNC: 106 MG/DL (ref 70–99)
GLUCOSE BLDC GLUCOMTR-MCNC: 107 MG/DL (ref 70–99)
GLUCOSE BLDC GLUCOMTR-MCNC: 112 MG/DL (ref 70–99)
GLUCOSE BLDC GLUCOMTR-MCNC: 114 MG/DL (ref 70–99)
GLUCOSE BLDC GLUCOMTR-MCNC: 118 MG/DL (ref 70–99)
GLUCOSE BLDC GLUCOMTR-MCNC: 120 MG/DL (ref 70–99)
GLUCOSE BLDC GLUCOMTR-MCNC: 124 MG/DL (ref 70–99)
GLUCOSE BLDC GLUCOMTR-MCNC: 137 MG/DL (ref 70–99)
GLUCOSE BLDC GLUCOMTR-MCNC: 138 MG/DL (ref 70–99)
GLUCOSE BLDC GLUCOMTR-MCNC: 139 MG/DL (ref 70–99)
GLUCOSE BLDC GLUCOMTR-MCNC: 139 MG/DL (ref 70–99)
GLUCOSE BLDC GLUCOMTR-MCNC: 142 MG/DL (ref 70–99)
GLUCOSE BLDC GLUCOMTR-MCNC: 146 MG/DL (ref 70–99)
GLUCOSE BLDC GLUCOMTR-MCNC: 148 MG/DL (ref 70–99)
GLUCOSE BLDC GLUCOMTR-MCNC: 149 MG/DL (ref 70–99)
GLUCOSE BLDC GLUCOMTR-MCNC: 149 MG/DL (ref 70–99)
GLUCOSE BLDC GLUCOMTR-MCNC: 158 MG/DL (ref 70–99)
GLUCOSE BLDC GLUCOMTR-MCNC: 159 MG/DL (ref 70–99)
GLUCOSE BLDC GLUCOMTR-MCNC: 184 MG/DL (ref 70–99)
GLUCOSE BLDC GLUCOMTR-MCNC: 218 MG/DL (ref 70–99)
GLUCOSE BLDC GLUCOMTR-MCNC: 79 MG/DL (ref 70–99)
GLUCOSE BLDC GLUCOMTR-MCNC: 88 MG/DL (ref 70–99)
GLUCOSE BLDC GLUCOMTR-MCNC: 98 MG/DL (ref 70–99)
GLUCOSE SERPL-MCNC: 132 MG/DL (ref 70–99)
H PYLORI AG STL QL IA: NORMAL
HCT VFR BLD AUTO: 33.4 % (ref 35–47)
HGB BLD-MCNC: 11.2 G/DL (ref 11.7–15.7)
IMM GRANULOCYTES # BLD: 0 10E9/L (ref 0–0.4)
IMM GRANULOCYTES NFR BLD: 0.2 %
LYMPHOCYTES # BLD AUTO: 1.9 10E9/L (ref 0.8–5.3)
LYMPHOCYTES NFR BLD AUTO: 41.4 %
MAGNESIUM SERPL-MCNC: 2 MG/DL (ref 1.6–2.3)
MCH RBC QN AUTO: 31.2 PG (ref 26.5–33)
MCHC RBC AUTO-ENTMCNC: 33.5 G/DL (ref 31.5–36.5)
MCV RBC AUTO: 93 FL (ref 78–100)
MONOCYTES # BLD AUTO: 0.4 10E9/L (ref 0–1.3)
MONOCYTES NFR BLD AUTO: 9.1 %
NEUTROPHILS # BLD AUTO: 2.2 10E9/L (ref 1.6–8.3)
NEUTROPHILS NFR BLD AUTO: 48.4 %
NRBC # BLD AUTO: 0 10*3/UL
NRBC BLD AUTO-RTO: 0 /100
PHOSPHATE SERPL-MCNC: 3.5 MG/DL (ref 2.5–4.5)
PLATELET # BLD AUTO: 106 10E9/L (ref 150–450)
POTASSIUM SERPL-SCNC: 4 MMOL/L (ref 3.4–5.3)
RBC # BLD AUTO: 3.59 10E12/L (ref 3.8–5.2)
RETINYL PALMITATE SERPL-MCNC: <0.02 MG/L (ref 0–0.1)
SODIUM SERPL-SCNC: 139 MMOL/L (ref 133–144)
SPECIMEN SOURCE: NORMAL
VIT A SERPL-MCNC: 0.17 MG/L (ref 0.3–1.2)
WBC # BLD AUTO: 4.6 10E9/L (ref 4–11)

## 2018-03-14 PROCEDURE — 25000132 ZZH RX MED GY IP 250 OP 250 PS 637: Performed by: NURSE PRACTITIONER

## 2018-03-14 PROCEDURE — 85025 COMPLETE CBC W/AUTO DIFF WBC: CPT | Performed by: SURGERY

## 2018-03-14 PROCEDURE — 25000132 ZZH RX MED GY IP 250 OP 250 PS 637: Performed by: PHYSICIAN ASSISTANT

## 2018-03-14 PROCEDURE — 25000125 ZZHC RX 250: Performed by: SURGERY

## 2018-03-14 PROCEDURE — 12000024 ZZH R&B TRANSPLANT CRITICAL

## 2018-03-14 PROCEDURE — 80048 BASIC METABOLIC PNL TOTAL CA: CPT | Performed by: SURGERY

## 2018-03-14 PROCEDURE — 25000132 ZZH RX MED GY IP 250 OP 250 PS 637: Performed by: SURGERY

## 2018-03-14 PROCEDURE — 87496 CYTOMEG DNA AMP PROBE: CPT | Performed by: SURGERY

## 2018-03-14 PROCEDURE — 40000893 ZZH STATISTIC PT IP EVAL DEFER

## 2018-03-14 PROCEDURE — 83735 ASSAY OF MAGNESIUM: CPT | Performed by: SURGERY

## 2018-03-14 PROCEDURE — 25000128 H RX IP 250 OP 636: Performed by: PHYSICIAN ASSISTANT

## 2018-03-14 PROCEDURE — 00000146 ZZHCL STATISTIC GLUCOSE BY METER IP

## 2018-03-14 PROCEDURE — 84100 ASSAY OF PHOSPHORUS: CPT | Performed by: SURGERY

## 2018-03-14 PROCEDURE — 36592 COLLECT BLOOD FROM PICC: CPT | Performed by: SURGERY

## 2018-03-14 PROCEDURE — 25000125 ZZHC RX 250: Performed by: PHYSICIAN ASSISTANT

## 2018-03-14 PROCEDURE — 27210443 ZZH NUTRITION PRODUCT SPECIALIZED PACKET

## 2018-03-14 RX ORDER — DICYCLOMINE HYDROCHLORIDE 10 MG/5ML
20 SOLUTION ORAL
Status: COMPLETED | OUTPATIENT
Start: 2018-03-14 | End: 2018-03-14

## 2018-03-14 RX ORDER — HYOSCYAMINE SULFATE 0.12 MG/ML
0.12 LIQUID ORAL ONCE
Status: COMPLETED | OUTPATIENT
Start: 2018-03-14 | End: 2018-03-14

## 2018-03-14 RX ADMIN — Medication 1 ML: at 08:34

## 2018-03-14 RX ADMIN — VITAMIN D, TAB 1000IU (100/BT) 3000 UNITS: 25 TAB at 08:33

## 2018-03-14 RX ADMIN — OXYCODONE HYDROCHLORIDE 5 MG: 5 SOLUTION ORAL at 20:35

## 2018-03-14 RX ADMIN — ONDANSETRON 4 MG: 2 INJECTION INTRAMUSCULAR; INTRAVENOUS at 01:54

## 2018-03-14 RX ADMIN — VITAMIN D, TAB 1000IU (100/BT) 5000 UNITS: 25 TAB at 08:33

## 2018-03-14 RX ADMIN — DICYCLOMINE HYDROCHLORIDE 20 MG: 10 SOLUTION ORAL at 15:47

## 2018-03-14 RX ADMIN — PROCHLORPERAZINE EDISYLATE 10 MG: 5 INJECTION INTRAMUSCULAR; INTRAVENOUS at 13:04

## 2018-03-14 RX ADMIN — Medication 7.5 MG: at 20:30

## 2018-03-14 RX ADMIN — ONDANSETRON 4 MG: 2 INJECTION INTRAMUSCULAR; INTRAVENOUS at 23:59

## 2018-03-14 RX ADMIN — OXYCODONE HYDROCHLORIDE 5 MG: 5 SOLUTION ORAL at 16:36

## 2018-03-14 RX ADMIN — OXYCODONE HYDROCHLORIDE 5 MG: 5 SOLUTION ORAL at 13:04

## 2018-03-14 RX ADMIN — POLYETHYLENE GLYCOL 3350 8.5 G: 17 POWDER, FOR SOLUTION ORAL at 08:34

## 2018-03-14 RX ADMIN — OXYCODONE HYDROCHLORIDE 5 MG: 5 SOLUTION ORAL at 18:38

## 2018-03-14 RX ADMIN — ONDANSETRON 4 MG: 2 INJECTION INTRAMUSCULAR; INTRAVENOUS at 18:05

## 2018-03-14 RX ADMIN — MAGNESIUM SULFATE HEPTAHYDRATE: 500 INJECTION, SOLUTION INTRAMUSCULAR; INTRAVENOUS at 20:22

## 2018-03-14 RX ADMIN — POLYETHYLENE GLYCOL 3350 8.5 G: 17 POWDER, FOR SOLUTION ORAL at 20:23

## 2018-03-14 RX ADMIN — DULOXETINE HYDROCHLORIDE 30 MG: 30 CAPSULE, DELAYED RELEASE ORAL at 20:23

## 2018-03-14 RX ADMIN — AMITRIPTYLINE HYDROCHLORIDE 100 MG: 50 TABLET, FILM COATED ORAL at 20:29

## 2018-03-14 RX ADMIN — HUMAN INSULIN 2 UNITS/HR: 100 INJECTION, SOLUTION SUBCUTANEOUS at 09:16

## 2018-03-14 RX ADMIN — PANTOPRAZOLE SODIUM 40 MG: 40 INJECTION, POWDER, FOR SOLUTION INTRAVENOUS at 08:20

## 2018-03-14 RX ADMIN — HYOSCYAMINE SULFATE 0.12 MG: 0.12 SOLUTION/ DROPS ORAL at 11:14

## 2018-03-14 RX ADMIN — DULOXETINE HYDROCHLORIDE 30 MG: 30 CAPSULE, DELAYED RELEASE ORAL at 08:34

## 2018-03-14 RX ADMIN — ONDANSETRON 4 MG: 4 TABLET, ORALLY DISINTEGRATING ORAL at 08:33

## 2018-03-14 RX ADMIN — I.V. FAT EMULSION 250 ML: 20 EMULSION INTRAVENOUS at 20:23

## 2018-03-14 RX ADMIN — OXYCODONE HYDROCHLORIDE 10 MG: 5 SOLUTION ORAL at 01:12

## 2018-03-14 RX ADMIN — OXYCODONE HYDROCHLORIDE 5 MG: 5 SOLUTION ORAL at 08:34

## 2018-03-14 ASSESSMENT — PAIN DESCRIPTION - DESCRIPTORS
DESCRIPTORS: CRAMPING

## 2018-03-14 NOTE — PROGRESS NOTES
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 Gastroparesis with gastric pacemaker placed in 2017. S/p TP AIT 6/28/2013.      Cardiorespiratory: Stable   GI/Nutrition:   Severe Malnutrition/malabsorption:  3/12 G tube exchanged for GJ tube 3/12. TPN bridge. Vivonex 30cc/hr, increase to 40 tonight.   If unable to tolerate, consider SBFT tomorrow.  Abd pain/cramping:  3/12 scope for FT replacement:  Small bowel appeared diffusely edematous and friable, no ulcers, no esophagitis.  CMV pending.  Pt reports occ use of NSAIDs.  Will try antispasmodics today for cramping.  Constipation:  3/11 Abd XR showed large stool burden.  Miralax added 3/13.  R/o vitamin deficiency:  Vitamin levels ordered. Vit D def, continue supplement.  Heme:  Mild anemia and thrombocytopenia.  Fluid/Electrolytes: D5NS 50 cc/hr. TPN, reduce rate with increase in TF.  : no winchester  Post-pancreatectomy diabetes: Uncontrolled.  HA1C 15.9. Insulin gtt. Also, insulin in TPN.  Home pump broken, pt contacting company.  Infection: No issues  Prophylaxis: PPI, Anticoagulation: low risk, patient mobile  Pain control: Oxycodone 5-10 every 4 hours prn.   Activity: Ambulate QID  Anticipated LOS/Discharge: 2-3 days    Medical Decision Making: Medium  Subsequent visit 82806 (moderate level decision making)    JUDI/Fellow/Resident Provider: Pooja Sapp NP [id number]    Faculty: Rajendra Cruz MD  Attestation:  Patient has been seen and evaluated by me.   Vital signs, labs, medications and orders were reviewed.   When obtained, diagnostic images were reviewed by me and interpreted as above.    The care plan was discussed with the multidisciplinary team and I agree with the findings and plan in this note, with any differences recorded  "in blue.            Rajendra Cruz MD  Department of Surgery    __________________________________________________________________  Transplant History: Admitted 3/11/2018 for failure to thrive, malabsorption, uncontrolled diabetes  6/28/2013 (Islet), Postoperative day: 1720     Interval History: History is obtained from the patient  Overnight events:   Constant mid-abdominal cramping.  Brief flashes of flushing and dizziness, associates with previous dumping episodes.  Loose stools.    ROS:   A 10-point review of systems was negative except as noted above.    Curent Meds:    lipids  250 mL Intravenous Once per day on Mon Wed Fri     [START ON 3/15/2018] multivitamin CF formula  4 mL Per Feeding Tube Daily     DULoxetine  30 mg Oral BID     polyethylene glycol  8.5 g Oral BID     pantoprazole (PROTONIX) IV  40 mg Intravenous Q24H     mirtazapine  7.5 mg Orally disintegrating tablet At Bedtime     sodium chloride (PF)  10 mL Intracatheter Q7 Days     sodium chloride (PF)  3 mL Intracatheter Q8H     amitriptyline  100 mg Oral At Bedtime     cholecalciferol  3,000 Units Oral Daily    And     cholecalciferol  5,000 Units Oral Daily       Physical Exam:     Admit Weight: 44 kg (97 lb)    Current Vitals:   /73  Pulse 106  Temp 97.9  F (36.6  C) (Oral)  Resp 20  Ht 1.575 m (5' 2\")  Wt 48.1 kg (106 lb 1.6 oz)  SpO2 100%  BMI 19.41 kg/m2    Vital sign ranges:    Temp:  [97.8  F (36.6  C)-99  F (37.2  C)] 97.9  F (36.6  C)  Pulse:  [103-112] 106  Heart Rate:  [] 92  Resp:  [16-20] 20  BP: ()/(65-73) 109/73  SpO2:  [96 %-100 %] 100 %  Patient Vitals for the past 24 hrs:   BP Temp Temp src Pulse Heart Rate Resp SpO2 Weight   03/14/18 1549 109/73 97.9  F (36.6  C) Oral 106 - 20 100 % -   03/14/18 1115 114/70 98.9  F (37.2  C) Oral 103 - 18 100 % -   03/14/18 0816 - - - - - - - 48.1 kg (106 lb 1.6 oz)   03/14/18 0725 109/65 97.8  F (36.6  C) Oral 108 - 16 96 % -   03/14/18 0400 96/65 98.7  F (37.1  C) Oral - 92 " 16 99 % -   03/14/18 0002 102/65 99  F (37.2  C) Oral - 107 16 96 % -   03/13/18 2037 119/68 98.6  F (37  C) Oral 112 - 16 99 % -     General Appearance: in no apparent distress.   Skin: normal, dry, no rash  Heart: well perfused  Lungs: NLB on RA  Abdomen: soft, rounded, non tender. GJ tube.   : winchester is not present.    Extremities: edema: absent.   PICC line    Data:   CMP    Recent Labs  Lab 03/14/18  0738 03/13/18  1540 03/13/18  0551     --  143   POTASSIUM 4.0 3.4 3.8   CHLORIDE 106  --  112*   CO2 26  --  22   *  --  135*   BUN 10  --  8   CR 0.39*  --  0.42*   GFRESTIMATED >90  --  >90   GFRESTBLACK >90  --  >90   JESSICA 8.0*  --  7.9*   MAG 2.0  --  1.8   PHOS 3.5  --  5.2*   ALBUMIN  --   --  2.4*   BILITOTAL  --   --  0.4   ALKPHOS  --   --  124   AST  --   --  49*   ALT  --   --  43     CBC    Recent Labs  Lab 03/14/18  0738 03/13/18  0551  03/11/18  1425   HGB 11.2* 10.6*  < > 12.7   WBC 4.6 3.4*  < > 6.8   * 92*  < > 166   A1C  --   --   --  15.9*   < > = values in this interval not displayed.  Coags    Recent Labs  Lab 03/13/18  0551 03/11/18  1425   INR 1.34* 1.20*   PTT  --  23      Urinalysis  Recent Labs   Lab Test  06/16/17   1115  06/15/17   1132   COLOR  Straw  Yellow   APPEARANCE  Clear  Slightly Cloudy   URINEGLC  >1000*  >1000*   URINEBILI  Negative  Negative   URINEKETONE  Negative  >150*   SG  1.000*  1.024   UBLD  Negative  Trace*   URINEPH  5.0  5.5   PROTEIN  Negative  30*   NITRITE  Negative  Negative   LEUKEST  Negative  Negative   RBCU  <1  8*   WBCU  1  2

## 2018-03-14 NOTE — PROGRESS NOTES
CLINICAL NUTRITION SERVICES - BRIEF NOTE    Vivonex TEN @ 30 ml/hr this morning - patient reports abdominal cramping associated with TF     TPN (1080 ml) - Dex 100 gram, AA 65 grams + 250 ml IV lipids 5 times/week    Vitamin levels (pt reports has been taking MVW supplements with food and with enzymes, 1 capsule in AM and 1 in evening since summer)->   Vitamin A 0.17 (low) improved from 6/2017  Vitamin E 4.3 (low) decreased from 6/2017  Vitamin K still in process    Interventions:  Per team - plan to have TF advanced to 40 ml/hr this afternoon at 4 PM (writer updated orders as such and communicated this with RN).  TF at 40 ml/hr with provide 960 kcal, 36 grams protein, 196 grams CHO.     Entered TPN change order to decrease to 840 ml (35 ml/hr) per team request.  Will also decrease dextrose to 50 grams, AA to 50 grams and IV lipid frequency to 3 times/week.  New TPN regimen (starting at 8 PM this evening) will contain 584 kcal (13 kcal/kg) and 1.1g/kg protein daily.      TF at 30/40 ml/hr + updated TPN will provide 100% of patient's assessed needs.     Increase AquADEK to 4 ml.  Would have patient continue liquid form of vitamins to potentially increase absorption.  This provides 23,004 IU Vitamin A, 2,800 IU Vitamin D, 200 IU Vitamin E, 1600 mcg Vitamin K. The equivalent of this dosing with the MVW pediatric drops (which patient can receive through Cre[facility] program) is 2 ml daily.  Paged NP and PharmD to notify.     Please see note from 3/13 for additional recommendations.     Nathalie Beasley MS, RD, LD  Pager [phone]

## 2018-03-14 NOTE — PLAN OF CARE
Problem: Patient Care Overview  Goal: Plan of Care/Patient Progress Review  AVSS on RA. Insulin drip: Algorithm 1-2 ranged from 158-98. Received 10mg oxycodone for pain/cramping at 0100, and IVP zofran 4mg for same complaints at 0200.  TF running at 30ml/hr with 30ml water flushes q4h. D5NS maintenance fluid running at 50ml/hr. TPN running at 45ml/hr with lipids at 20.8ml/hr. Tolerating sugar free clears diet. Pt not saving voids, no reported BM overnight. Up ad nory, independent. Will continue to monitor and follow POC.

## 2018-03-14 NOTE — PROGRESS NOTES
Care Coordinator- Discharge Planning     Admission Date/Time:  3/11/2018  Attending MD:  Rajendra Cruz MD     Data  Date of initial CC assessment:  3.14.18  Chart reviewed, discussed with interdisciplinary team.   Patient was admitted for:   1. 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)    2. Hypokalemia    3. Hypophosphatemia    4. 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 Gastroparesis with gastric pacemaker placed in 2017. S/p TP AIT 6/28/2013--per Melissa Sapp NP, note 3/13/18.  3/14 per Melissa, waiting for Dr Cruz to round--pt is not tolerating increase in enteral rate d/t cramping--remains on both TPN and enteral.     Assessment  Concerns with insurance coverage for discharge needs: None.  Current Living Situation: Patient lives with spouse.  Support System: Supportive and Involved  Services Involved: Home Infusion  Transportation: Car and Family or Friend will provide  Barriers to Discharge: plan with enteral feeds/TPN/insulin needs--her own insulin pump is NOT working      Coordination of Care and Referrals: Provided patient/family with options for Home Infusion.  Per Kristyn at [facility] [phone]--Primary Insurance is [place] Plan with [facility] secondary--[facility] has obtained authorizations from [place] to be her In Network Infusion Agency--pt agrees--she has 100% coverage after her $2000 ded is met (met $435) and OOP $ 5000 (met $ 729)--pt says [facility] pays her OOP and deductible.  Pt is independent with enteral feeds--does not need a HHN.  Pt has her own insulin pump: Medtronic Mini Med and has had it x 4.5 years  --it has been using a lot of batteries lately and she put in a new battery before the ride here and it QUIT working on the drive here from [place]--I informed melissa Sapp NP and  I have text paged Lena Lara [place].--most likely will have to send her home with ssi, until she can get a new pump.  Per Lena Lara--pt to call the [phone] for [employer] on the back of her pump to see if it is under warranty and to obtain a new one--I will tell her to do this today, and she will. (Per Margarita Gerard to [facility]--[employer] should ship a new one within 1 day)--I have given her the hospital address/unit. Pooja cee.  Pt has a PICC-DL valved Power PICC placed 3/12/18.  OP CC: Margarita Gerard  [facility] Trasplant surgeon: Dr Rajendra Cruz  0PCP:  Dr Marleny Hathaway  [facility] [place]  [phone]  Endocrinologist: Dr Underwood   [facility] [place]  [phone]      Plan  Anticipated Discharge Date:  3/17  Anticipated Discharge Plan:  To be determined    CTS Handoff completed:  YES    Marlin Clay RN     24-Apr-2017 15:43

## 2018-03-14 NOTE — PLAN OF CARE
Problem: Patient Care Overview  Goal: Plan of Care/Patient Progress Review  Pt tachy. OVSS. C/o mild abdominal pain, yet declines the need for medications. Intermittent nausea noted. Zofran and compazine given x 1. Tube feeds restarted via peg. Infusing at goal rate of 30 ml/hr. Diet changed from clears to NPO. Dr Cruz spoke to pt and per pt, it would be ok to have sugar free clears this evening. Cross cover notified and diet changed to sips of clear liquids(sugar free). Pt up ad nory. Voiding spont. Not saving. Reports multiple loose bm's. H pylori sent. K 3.4. Replaced with 20 meq. Insulin gtt infusing. Currently following alg #1. BS . Abdominal x ray done. Pt walked in halls x 3.  at bedside. Continue with poc.

## 2018-03-14 NOTE — PLAN OF CARE
"Problem: Patient Care Overview  Goal: Plan of Care/Patient Progress Review  Outcome: No Change  /65 (BP Location: Right arm)  Pulse 112  Temp 99  F (37.2  C) (Oral)  Resp 16  Ht 1.575 m (5' 2\")  Wt 44 kg (97 lb)  SpO2 96%  BMI 17.74 kg/m2   VSS on RA. PRN oxy given for abdominal discomfort. Zofran given x1 for complaints of nausea. L PICC with TPN,  Lipids, abd Insulin gtt. BS range 184-347. @ 1930 pt ate jello from kitchen labeled \"sugar free\" but pts BS jumped to 347 after jello was consumed. Currently on 5.5units - alg. 3. Darryl with TF @ 30cc (goal). Voiding not saving. Reported x1 loose stool. Up ad nory. Will continue to monitor. Call light in reach, continue with POC.       "

## 2018-03-14 NOTE — PLAN OF CARE
Problem: Patient Care Overview  Goal: Plan of Care/Patient Progress Review  Outcome: No Change  Afebrile, tachycardic 100's, VSS. Cramping d/t tube feeds, current rate at 30ml/hour. Orders to increased to 40cc/hour at 1600. Hycosamine given with some relief. Oxycodone 5mg X 2 doses also administered. Zofran and compazine also administered. Did eat two popsicles. Also c/o waves dizzyness and nausea, similar to symptoms she's had of gastric dumping. MD aware. TPN infusing at 45cc/hour. MIV discontinued. Insulin gtt infusing, -114. Voiding not saving, had one soft medium sized BM today. K 4.0, not requiring replacement.

## 2018-03-14 NOTE — PLAN OF CARE
Problem: Patient Care Overview  Goal: Plan of Care/Patient Progress Review  7A-PT: PT consult received and appreciated. Per chart review, discussion with RN and with pt, she is up ad nory. Has been taking numerous walks every day. Denies concerns related to mobility. Pt reports plan to dc home with support of SO. Encouraged pt to remain up and active during hospital stay. PT to defer. Will complete orders, please re-consult if pt has change in status.

## 2018-03-15 LAB
ANION GAP SERPL CALCULATED.3IONS-SCNC: 7 MMOL/L (ref 3–14)
BASOPHILS # BLD AUTO: 0 10E9/L (ref 0–0.2)
BASOPHILS NFR BLD AUTO: 0.3 %
BUN SERPL-MCNC: 10 MG/DL (ref 7–30)
CALCIUM SERPL-MCNC: 8 MG/DL (ref 8.5–10.1)
CHLORIDE SERPL-SCNC: 103 MMOL/L (ref 94–109)
CO2 SERPL-SCNC: 30 MMOL/L (ref 20–32)
CREAT SERPL-MCNC: 0.38 MG/DL (ref 0.52–1.04)
DIFFERENTIAL METHOD BLD: ABNORMAL
EOSINOPHIL # BLD AUTO: 0 10E9/L (ref 0–0.7)
EOSINOPHIL NFR BLD AUTO: 0.5 %
ERYTHROCYTE [DISTWIDTH] IN BLOOD BY AUTOMATED COUNT: 15.1 % (ref 10–15)
GFR SERPL CREATININE-BSD FRML MDRD: >90 ML/MIN/1.7M2
GLUCOSE BLDC GLUCOMTR-MCNC: 112 MG/DL (ref 70–99)
GLUCOSE BLDC GLUCOMTR-MCNC: 112 MG/DL (ref 70–99)
GLUCOSE BLDC GLUCOMTR-MCNC: 118 MG/DL (ref 70–99)
GLUCOSE BLDC GLUCOMTR-MCNC: 119 MG/DL (ref 70–99)
GLUCOSE BLDC GLUCOMTR-MCNC: 119 MG/DL (ref 70–99)
GLUCOSE BLDC GLUCOMTR-MCNC: 121 MG/DL (ref 70–99)
GLUCOSE BLDC GLUCOMTR-MCNC: 132 MG/DL (ref 70–99)
GLUCOSE BLDC GLUCOMTR-MCNC: 133 MG/DL (ref 70–99)
GLUCOSE BLDC GLUCOMTR-MCNC: 135 MG/DL (ref 70–99)
GLUCOSE BLDC GLUCOMTR-MCNC: 136 MG/DL (ref 70–99)
GLUCOSE BLDC GLUCOMTR-MCNC: 142 MG/DL (ref 70–99)
GLUCOSE BLDC GLUCOMTR-MCNC: 146 MG/DL (ref 70–99)
GLUCOSE BLDC GLUCOMTR-MCNC: 148 MG/DL (ref 70–99)
GLUCOSE BLDC GLUCOMTR-MCNC: 154 MG/DL (ref 70–99)
GLUCOSE BLDC GLUCOMTR-MCNC: 160 MG/DL (ref 70–99)
GLUCOSE BLDC GLUCOMTR-MCNC: 162 MG/DL (ref 70–99)
GLUCOSE BLDC GLUCOMTR-MCNC: 170 MG/DL (ref 70–99)
GLUCOSE BLDC GLUCOMTR-MCNC: 190 MG/DL (ref 70–99)
GLUCOSE BLDC GLUCOMTR-MCNC: 206 MG/DL (ref 70–99)
GLUCOSE BLDC GLUCOMTR-MCNC: 226 MG/DL (ref 70–99)
GLUCOSE BLDC GLUCOMTR-MCNC: 77 MG/DL (ref 70–99)
GLUCOSE BLDC GLUCOMTR-MCNC: 87 MG/DL (ref 70–99)
GLUCOSE BLDC GLUCOMTR-MCNC: 89 MG/DL (ref 70–99)
GLUCOSE BLDC GLUCOMTR-MCNC: 94 MG/DL (ref 70–99)
GLUCOSE SERPL-MCNC: 136 MG/DL (ref 70–99)
HCT VFR BLD AUTO: 32.6 % (ref 35–47)
HGB BLD-MCNC: 10.7 G/DL (ref 11.7–15.7)
IMM GRANULOCYTES # BLD: 0 10E9/L (ref 0–0.4)
IMM GRANULOCYTES NFR BLD: 0 %
LACTATE BLD-SCNC: 1.8 MMOL/L (ref 0.4–1.9)
LYMPHOCYTES # BLD AUTO: 1.4 10E9/L (ref 0.8–5.3)
LYMPHOCYTES NFR BLD AUTO: 38.1 %
MAGNESIUM SERPL-MCNC: 1.9 MG/DL (ref 1.6–2.3)
MCH RBC QN AUTO: 31.6 PG (ref 26.5–33)
MCHC RBC AUTO-ENTMCNC: 32.8 G/DL (ref 31.5–36.5)
MCV RBC AUTO: 96 FL (ref 78–100)
MONOCYTES # BLD AUTO: 0.4 10E9/L (ref 0–1.3)
MONOCYTES NFR BLD AUTO: 10.2 %
NEUTROPHILS # BLD AUTO: 1.9 10E9/L (ref 1.6–8.3)
NEUTROPHILS NFR BLD AUTO: 50.9 %
NRBC # BLD AUTO: 0 10*3/UL
NRBC BLD AUTO-RTO: 0 /100
PHOSPHATE SERPL-MCNC: 4.1 MG/DL (ref 2.5–4.5)
PHYTONADIONE SERPL-MCNC: 1.02 NMOL/L (ref 0.22–4.88)
PLATELET # BLD AUTO: 92 10E9/L (ref 150–450)
POTASSIUM SERPL-SCNC: 4.1 MMOL/L (ref 3.4–5.3)
RBC # BLD AUTO: 3.39 10E12/L (ref 3.8–5.2)
SODIUM SERPL-SCNC: 140 MMOL/L (ref 133–144)
WBC # BLD AUTO: 3.7 10E9/L (ref 4–11)

## 2018-03-15 PROCEDURE — 25000128 H RX IP 250 OP 636: Performed by: PHYSICIAN ASSISTANT

## 2018-03-15 PROCEDURE — 36592 COLLECT BLOOD FROM PICC: CPT | Performed by: SURGERY

## 2018-03-15 PROCEDURE — 12000024 ZZH R&B TRANSPLANT CRITICAL

## 2018-03-15 PROCEDURE — 25000132 ZZH RX MED GY IP 250 OP 250 PS 637: Performed by: NURSE PRACTITIONER

## 2018-03-15 PROCEDURE — 36592 COLLECT BLOOD FROM PICC: CPT | Performed by: PHYSICIAN ASSISTANT

## 2018-03-15 PROCEDURE — 00000146 ZZHCL STATISTIC GLUCOSE BY METER IP

## 2018-03-15 PROCEDURE — 25000132 ZZH RX MED GY IP 250 OP 250 PS 637: Performed by: SURGERY

## 2018-03-15 PROCEDURE — 80048 BASIC METABOLIC PNL TOTAL CA: CPT | Performed by: PHYSICIAN ASSISTANT

## 2018-03-15 PROCEDURE — 27210443 ZZH NUTRITION PRODUCT SPECIALIZED PACKET

## 2018-03-15 PROCEDURE — 84100 ASSAY OF PHOSPHORUS: CPT | Performed by: PHYSICIAN ASSISTANT

## 2018-03-15 PROCEDURE — 83605 ASSAY OF LACTIC ACID: CPT | Performed by: SURGERY

## 2018-03-15 PROCEDURE — 85025 COMPLETE CBC W/AUTO DIFF WBC: CPT | Performed by: PHYSICIAN ASSISTANT

## 2018-03-15 PROCEDURE — 83735 ASSAY OF MAGNESIUM: CPT | Performed by: PHYSICIAN ASSISTANT

## 2018-03-15 PROCEDURE — 25000125 ZZHC RX 250: Performed by: SURGERY

## 2018-03-15 PROCEDURE — 25000132 ZZH RX MED GY IP 250 OP 250 PS 637: Performed by: PHYSICIAN ASSISTANT

## 2018-03-15 PROCEDURE — 25000125 ZZHC RX 250: Performed by: PHYSICIAN ASSISTANT

## 2018-03-15 RX ADMIN — VITAMIN D, TAB 1000IU (100/BT) 3000 UNITS: 25 TAB at 08:23

## 2018-03-15 RX ADMIN — DULOXETINE HYDROCHLORIDE 30 MG: 30 CAPSULE, DELAYED RELEASE ORAL at 08:23

## 2018-03-15 RX ADMIN — Medication 7.5 MG: at 22:04

## 2018-03-15 RX ADMIN — AMITRIPTYLINE HYDROCHLORIDE 100 MG: 50 TABLET, FILM COATED ORAL at 22:04

## 2018-03-15 RX ADMIN — OXYCODONE HYDROCHLORIDE 5 MG: 5 SOLUTION ORAL at 18:03

## 2018-03-15 RX ADMIN — ONDANSETRON 4 MG: 4 TABLET, ORALLY DISINTEGRATING ORAL at 14:15

## 2018-03-15 RX ADMIN — VITAMIN D, TAB 1000IU (100/BT) 5000 UNITS: 25 TAB at 08:23

## 2018-03-15 RX ADMIN — PANTOPRAZOLE SODIUM 40 MG: 40 INJECTION, POWDER, FOR SOLUTION INTRAVENOUS at 08:23

## 2018-03-15 RX ADMIN — PROCHLORPERAZINE EDISYLATE 10 MG: 5 INJECTION INTRAMUSCULAR; INTRAVENOUS at 08:24

## 2018-03-15 RX ADMIN — OXYCODONE HYDROCHLORIDE 10 MG: 5 SOLUTION ORAL at 04:26

## 2018-03-15 RX ADMIN — ONDANSETRON 4 MG: 4 TABLET, ORALLY DISINTEGRATING ORAL at 04:27

## 2018-03-15 RX ADMIN — ONDANSETRON 4 MG: 4 TABLET, ORALLY DISINTEGRATING ORAL at 23:04

## 2018-03-15 RX ADMIN — OXYCODONE HYDROCHLORIDE 5 MG: 5 SOLUTION ORAL at 12:28

## 2018-03-15 RX ADMIN — DULOXETINE HYDROCHLORIDE 30 MG: 30 CAPSULE, DELAYED RELEASE ORAL at 22:04

## 2018-03-15 RX ADMIN — OXYCODONE HYDROCHLORIDE 10 MG: 5 SOLUTION ORAL at 22:04

## 2018-03-15 RX ADMIN — HUMAN INSULIN 1.6 UNITS/HR: 100 INJECTION, SOLUTION SUBCUTANEOUS at 12:59

## 2018-03-15 RX ADMIN — MAGNESIUM SULFATE HEPTAHYDRATE: 500 INJECTION, SOLUTION INTRAMUSCULAR; INTRAVENOUS at 20:30

## 2018-03-15 RX ADMIN — POLYETHYLENE GLYCOL 3350 8.5 G: 17 POWDER, FOR SOLUTION ORAL at 20:35

## 2018-03-15 RX ADMIN — PROCHLORPERAZINE MALEATE 10 MG: 5 TABLET, FILM COATED ORAL at 18:03

## 2018-03-15 RX ADMIN — Medication 250 MG: at 20:35

## 2018-03-15 RX ADMIN — OXYCODONE HYDROCHLORIDE 5 MG: 5 SOLUTION ORAL at 08:24

## 2018-03-15 RX ADMIN — POLYETHYLENE GLYCOL 3350 8.5 G: 17 POWDER, FOR SOLUTION ORAL at 08:24

## 2018-03-15 RX ADMIN — Medication 250 MG: at 16:11

## 2018-03-15 RX ADMIN — Medication 4 ML: at 08:23

## 2018-03-15 ASSESSMENT — PAIN DESCRIPTION - DESCRIPTORS: DESCRIPTORS: CRAMPING

## 2018-03-15 NOTE — PLAN OF CARE
"Problem: Patient Care Overview  Goal: Plan of Care/Patient Progress Review  Outcome: No Change  /67 (BP Location: Right arm)  Pulse 100  Temp 99.2  F (37.3  C) (Oral)  Resp 18  Ht 1.575 m (5' 2\")  Wt 48.1 kg (106 lb 1.6 oz)  SpO2 100%  BMI 19.41 kg/m2.  Abdominal pain controlled with prn Oxycodone.  No c/o's nausea.  BG range  , insulin gtt per algorithm 2-3.   Tube feeds at 40cc/hour.  TPN at 35cc/hour & Lipids at 20.8cc/hour via left PICC.  Pt. voiding but not saving & no stools reported this evening. Continue to follow POC & notify MD with changes.        "

## 2018-03-15 NOTE — PLAN OF CARE
Problem: Patient Care Overview  Goal: Plan of Care/Patient Progress Review  AVSS on RA. Insulin drip: Algorithm 2-3 ranged from . Received 10mg oxycodone for pain/cramping at 0425, and IVP zofran 4mg for same complaints at 0000 and again at 0425.  TF running at 40ml/hr with 30ml water flushes q4h. D5NS TKO to run insulin drip. TPN running at 35ml/hr with lipids at 20.8ml/hr. Tolerating sugar free clears diet. Pt not saving voids, no reported BM overnight. Up ad nory, independent. Slept well between cares. Will continue to monitor and follow POC.

## 2018-03-15 NOTE — PROGRESS NOTES
CLINICAL NUTRITION SERVICES - BRIEF NOTE    Spoke with patient re: vitamin levels/recommendations this morning.      Per discussion with Dr. Cruz -> she would like to transition patient to a more concentrated formula given patient's poor tolerance to 30-40 ml/hr on current elemental formula.  She would also like patient to use RELiZORB enzyme cartridge with TF.  Margarita (coordinator) working on getting this.  Writer provided forms to team that can be faxed to the company for the patient to get on an outpatient basis.      Recommend trying Peptamen 1.5 (semi-elemental formula, 70% MCT) @ 40 ml/hr.  This will provide 100% of assessed needs.  This will provide 1440 kcals (33  kcal/kg/day), 65 g PRO (1.5 g/kg/day), 739 ml free H2O, 54 g Fat (70% from MCTs), 180 g CHO and no Fiber daily.    If unable to obtain RELiZORB or until able to receive RELiZORB, please order the following:   (please copy and paste administration instructions into each order)  A) Sodium Bicarb tablet (325 mg), 1 tablet q 4 hrs via Jtube. Administration Instructions: Crush 1 tablet and mix into 15 ml of warm water and use this solution to mix with Creon pancreatic enzymes. DO NOT administer directly into Jtube (to be mixed into TF formula with Creon enzyme - see Creon enzyme order)   B) Creon 24, 1 capsules q 4 hrs via Jtube. Administration Instructions:   **Open capsule and empty contents into 15 ml sodium bicarb solution (see sodium bicarb order), let dissolve for about 20-30 minutes and then add this solution to the amount of TF formula hung in TF bag every 4 hrs (i.e., once TF @ goal infusion 40 ml/hr will mix 2 capsules into 160 ml of TF formula every 4 hrs).   *Note: this enzyme regimen with TF @ goal infusion will provide approx 2667 units of lipase/gram of total Fat daily and approp dosing initially for pancreatic insufficiency with more elemental TF formula.     Recommendations:    Discontinue TPN with transition to Peptamen 1.5 @ 40 ml/hr.      If Peptamen 1.5 poorly tolerated, recommend going back to Vivonex TEN with the plan for patient to transition to Vivonex RTF (10% kcal from fat) at discharge - see full recommendations for this in note from 3/13.    Nathalie Beasley MS, RD, LD  Pager [phone]

## 2018-03-15 NOTE — DISCHARGE INSTRUCTIONS
________________________________________________________  Discharge RN please fax discharge orders to home care agency: [facility]  --they need signed discharge orders by 12 noon on the day of discharge  ---page ciara Snider @ [phone] Monday-Friday  ---weekends call office [phone]  ________________________________________________________

## 2018-03-15 NOTE — PROGRESS NOTES
Pancreatitis Service - Daily Progress Note  03/15/2018    Assessment & Plan: 42 year old female who presents with chronic diarrhea with rectal prolapse and pain with bowel movement, malabsorption, steatorrhea and failure to thrive after a total pancreatectomy with auto islet transplantation. Gastroparesis with gastric pacemaker placed in 2017. S/p TPIAT 6/28/2013.      Cardiorespiratory: Stable   GI/Nutrition:   Severe Malnutrition/malabsorption:  3/12 G tube exchanged for GJ tube 3/12. TPN bridge. Vivonex 40cc/hr, goal of 60ml/h  Abd pain/cramping:  3/12 scope for FT replacement:  Small bowel appeared diffusely edematous and friable, no ulcers, no esophagitis.  CMV pending.  Pt reports occ use of NSAIDs.  H. Pylori negative.  Antispasmodic trial 3/15 not effective.  Constipation:  3/11 Abd XR showed large stool burden.  Miralax added 3/13.  R/o vitamin deficiency:  Vitamins A and E deficient.  Increase AquADEK.  Heme:  Mild anemia and thrombocytopenia.  Fluid/Electrolytes: TPN, 35ml/h.  Hypokalemia now resolved.    : no winchester  Post-pancreatectomy diabetes: Uncontrolled.  HA1C 15.9.  Insulin gtt.  Also, insulin in TPN.  Home pump broken, pt contacting company.  Infection: No issues. Consider empiric treatment for bacterial overgrowth due to visceral pain/cramping.  Prophylaxis: PPI, Anticoagulation: low risk, patient mobile  Pain control: Oxycodone 5-10 every 4 hours prn.   Activity: Ambulate QID  Anticipated LOS/Discharge: 2-3 days    Medical Decision Making: Medium  Subsequent visit 61597 (moderate level decision making)    JUDI/Fellow/Resident Provider: Pooja Sapp NP [id number]    Faculty: Rajendra Cruz MD  Attestation:  Patient has been seen and evaluated by me.   Vital signs, labs, medications and orders were reviewed.   When obtained, diagnostic images were reviewed by me and interpreted as above.    The care plan was discussed with the multidisciplinary team and I agree with the findings and plan in this note, with  "any differences recorded in blue.            Rajendra Cruz MD  Department of Surgery    __________________________________________________________________  Transplant History: Admitted 3/11/2018 for failure to thrive, malabsorption, uncontrolled diabetes  6/28/2013 ([facility]), Postoperative day: 1721     Interval History: History is obtained from the patient  Overnight events:   Constant mid-abdominal cramping.  No stool overnight or this morning.    ROS:   A 10-point review of systems was negative except as noted above.    Curent Meds:    lipids  250 mL Intravenous Once per day on Mon Wed Fri     multivitamin CF formula  4 mL Per Feeding Tube Daily     DULoxetine  30 mg Oral BID     polyethylene glycol  8.5 g Oral BID     pantoprazole (PROTONIX) IV  40 mg Intravenous Q24H     mirtazapine  7.5 mg Orally disintegrating tablet At Bedtime     sodium chloride (PF)  10 mL Intracatheter Q7 Days     sodium chloride (PF)  3 mL Intracatheter Q8H     amitriptyline  100 mg Oral At Bedtime     cholecalciferol  3,000 Units Oral Daily    And     cholecalciferol  5,000 Units Oral Daily       Physical Exam:     Admit Weight: 44 kg (97 lb)    Current Vitals:   BP 98/61 (BP Location: Right arm)  Pulse 100  Temp 98.9  F (37.2  C) (Oral)  Resp 16  Ht 1.575 m (5' 2\")  Wt 47.9 kg (105 lb 11.2 oz)  SpO2 98%  BMI 19.33 kg/m2    Vital sign ranges:    Temp:  [97.9  F (36.6  C)-99.2  F (37.3  C)] 98.9  F (37.2  C)  Pulse:  [100-106] 100  Heart Rate:  [] 97  Resp:  [16-20] 16  BP: ()/(61-73) 98/61  SpO2:  [98 %-100 %] 98 %  Patient Vitals for the past 24 hrs:   BP Temp Temp src Pulse Heart Rate Resp SpO2 Weight   03/15/18 1156 98/61 98.9  F (37.2  C) Oral - 97 16 98 % -   03/15/18 1013 - - - - - - - 47.9 kg (105 lb 11.2 oz)   03/15/18 0700 100/65 98.9  F (37.2  C) Oral - 104 16 99 % -   03/15/18 0406 103/66 98.1  F (36.7  C) Oral - 97 16 99 % -   03/15/18 0005 98/67 98.7  F (37.1  C) Oral - 87 16 98 % -   03/14/18 1909 104/67 99.2 "  F (37.3  C) Oral 100 - 18 100 % -   03/14/18 1549 109/73 97.9  F (36.6  C) Oral 106 - 20 100 % -     General Appearance: in no apparent distress.   Skin: normal, dry, no rash  Heart: well perfused  Lungs: NLB on RA  Abdomen: soft, rounded/protruding - unchanged from previous days, non tender. GJ tube.   : winchester is not present.    Extremities: edema: absent.   PICC line    Data:   CMP    Recent Labs  Lab 03/15/18  0627 03/14/18  0738  03/13/18  0551    139  --  143   POTASSIUM 4.1 4.0  < > 3.8   CHLORIDE 103 106  --  112*   CO2 30 26  --  22   * 132*  --  135*   BUN 10 10  --  8   CR 0.38* 0.39*  --  0.42*   GFRESTIMATED >90 >90  --  >90   GFRESTBLACK >90 >90  --  >90   JESSICA 8.0* 8.0*  --  7.9*   MAG 1.9 2.0  --  1.8   PHOS 4.1 3.5  --  5.2*   ALBUMIN  --   --   --  2.4*   BILITOTAL  --   --   --  0.4   ALKPHOS  --   --   --  124   AST  --   --   --  49*   ALT  --   --   --  43   < > = values in this interval not displayed.  CBC    Recent Labs  Lab 03/15/18  0627 03/14/18  0738  03/11/18  1425   HGB 10.7* 11.2*  < > 12.7   WBC 3.7* 4.6  < > 6.8   PLT 92* 106*  < > 166   A1C  --   --   --  15.9*   < > = values in this interval not displayed.  Coags    Recent Labs  Lab 03/13/18  0551 03/11/18  1425   INR 1.34* 1.20*   PTT  --  23      Urinalysis  Recent Labs   Lab Test  06/16/17   1115  06/15/17   1132   COLOR  Straw  Yellow   APPEARANCE  Clear  Slightly Cloudy   URINEGLC  >1000*  >1000*   URINEBILI  Negative  Negative   URINEKETONE  Negative  >150*   SG  1.000*  1.024   UBLD  Negative  Trace*   URINEPH  5.0  5.5   PROTEIN  Negative  30*   NITRITE  Negative  Negative   LEUKEST  Negative  Negative   RBCU  <1  8*   WBCU  1  2

## 2018-03-15 NOTE — PROGRESS NOTES
Ambulatory Insulin Pump Assessment  Received page from care coordinator regarding patient's insulin pump.   Patient's pump rapidly draining battery on drive from [place].  Recommended patient call [employer] 1-800# for trouble-shooting/pump replacement.  Attempted to check in with patient, but she was soundly sleeping so did not awaken.  Patient currently on IV insulin infusion.  Lena Lara MS RN CDE [facility]  [phone]

## 2018-03-15 NOTE — PLAN OF CARE
Problem: Patient Care Overview  Goal: Plan of Care/Patient Progress Review  Patient's VSS, afebrile, complaining of abdominal cramping; got oxycodone 5mg X 2 with some relief. TF continues at 40cc/hour. TPN at 35cc/hour. Insulin drip between 0.5-2 units/hour BS  between algorithm 1-2.  Voiding with no issues; No BM this shift. Up independently in halls and room.

## 2018-03-16 ENCOUNTER — APPOINTMENT (OUTPATIENT)
Dept: GENERAL RADIOLOGY | Facility: CLINIC | Age: 43
DRG: 391 | End: 2018-03-16
Attending: SURGERY
Payer: COMMERCIAL

## 2018-03-16 ENCOUNTER — APPOINTMENT (OUTPATIENT)
Dept: CT IMAGING | Facility: CLINIC | Age: 43
DRG: 391 | End: 2018-03-16
Attending: NURSE PRACTITIONER
Payer: COMMERCIAL

## 2018-03-16 LAB
ANION GAP SERPL CALCULATED.3IONS-SCNC: 6 MMOL/L (ref 3–14)
BASOPHILS # BLD AUTO: 0 10E9/L (ref 0–0.2)
BASOPHILS NFR BLD AUTO: 0.5 %
BUN SERPL-MCNC: 11 MG/DL (ref 7–30)
CALCIUM SERPL-MCNC: 8 MG/DL (ref 8.5–10.1)
CHLORIDE SERPL-SCNC: 100 MMOL/L (ref 94–109)
CMV DNA SPEC QL NAA+PROBE: NOT DETECTED
CO2 SERPL-SCNC: 33 MMOL/L (ref 20–32)
CREAT SERPL-MCNC: 0.44 MG/DL (ref 0.52–1.04)
DIFFERENTIAL METHOD BLD: ABNORMAL
EOSINOPHIL # BLD AUTO: 0 10E9/L (ref 0–0.7)
EOSINOPHIL NFR BLD AUTO: 0.7 %
ERYTHROCYTE [DISTWIDTH] IN BLOOD BY AUTOMATED COUNT: 14.6 % (ref 10–15)
GFR SERPL CREATININE-BSD FRML MDRD: >90 ML/MIN/1.7M2
GLUCOSE BLDC GLUCOMTR-MCNC: 113 MG/DL (ref 70–99)
GLUCOSE BLDC GLUCOMTR-MCNC: 113 MG/DL (ref 70–99)
GLUCOSE BLDC GLUCOMTR-MCNC: 114 MG/DL (ref 70–99)
GLUCOSE BLDC GLUCOMTR-MCNC: 115 MG/DL (ref 70–99)
GLUCOSE BLDC GLUCOMTR-MCNC: 126 MG/DL (ref 70–99)
GLUCOSE BLDC GLUCOMTR-MCNC: 128 MG/DL (ref 70–99)
GLUCOSE BLDC GLUCOMTR-MCNC: 130 MG/DL (ref 70–99)
GLUCOSE BLDC GLUCOMTR-MCNC: 137 MG/DL (ref 70–99)
GLUCOSE BLDC GLUCOMTR-MCNC: 142 MG/DL (ref 70–99)
GLUCOSE BLDC GLUCOMTR-MCNC: 151 MG/DL (ref 70–99)
GLUCOSE BLDC GLUCOMTR-MCNC: 156 MG/DL (ref 70–99)
GLUCOSE BLDC GLUCOMTR-MCNC: 160 MG/DL (ref 70–99)
GLUCOSE BLDC GLUCOMTR-MCNC: 173 MG/DL (ref 70–99)
GLUCOSE BLDC GLUCOMTR-MCNC: 178 MG/DL (ref 70–99)
GLUCOSE BLDC GLUCOMTR-MCNC: 181 MG/DL (ref 70–99)
GLUCOSE BLDC GLUCOMTR-MCNC: 183 MG/DL (ref 70–99)
GLUCOSE BLDC GLUCOMTR-MCNC: 191 MG/DL (ref 70–99)
GLUCOSE BLDC GLUCOMTR-MCNC: 195 MG/DL (ref 70–99)
GLUCOSE BLDC GLUCOMTR-MCNC: 206 MG/DL (ref 70–99)
GLUCOSE BLDC GLUCOMTR-MCNC: 242 MG/DL (ref 70–99)
GLUCOSE BLDC GLUCOMTR-MCNC: 62 MG/DL (ref 70–99)
GLUCOSE BLDC GLUCOMTR-MCNC: 67 MG/DL (ref 70–99)
GLUCOSE BLDC GLUCOMTR-MCNC: 69 MG/DL (ref 70–99)
GLUCOSE BLDC GLUCOMTR-MCNC: 84 MG/DL (ref 70–99)
GLUCOSE BLDC GLUCOMTR-MCNC: 92 MG/DL (ref 70–99)
GLUCOSE SERPL-MCNC: 162 MG/DL (ref 70–99)
HCT VFR BLD AUTO: 31.4 % (ref 35–47)
HGB BLD-MCNC: 10.3 G/DL (ref 11.7–15.7)
IMM GRANULOCYTES # BLD: 0 10E9/L (ref 0–0.4)
IMM GRANULOCYTES NFR BLD: 0 %
LYMPHOCYTES # BLD AUTO: 1.4 10E9/L (ref 0.8–5.3)
LYMPHOCYTES NFR BLD AUTO: 34.2 %
MAGNESIUM SERPL-MCNC: 2.1 MG/DL (ref 1.6–2.3)
MCH RBC QN AUTO: 31.2 PG (ref 26.5–33)
MCHC RBC AUTO-ENTMCNC: 32.8 G/DL (ref 31.5–36.5)
MCV RBC AUTO: 95 FL (ref 78–100)
MONOCYTES # BLD AUTO: 0.5 10E9/L (ref 0–1.3)
MONOCYTES NFR BLD AUTO: 12.2 %
NEUTROPHILS # BLD AUTO: 2.1 10E9/L (ref 1.6–8.3)
NEUTROPHILS NFR BLD AUTO: 52.4 %
NRBC # BLD AUTO: 0 10*3/UL
NRBC BLD AUTO-RTO: 0 /100
PHOSPHATE SERPL-MCNC: 4.4 MG/DL (ref 2.5–4.5)
PLATELET # BLD AUTO: 97 10E9/L (ref 150–450)
POTASSIUM SERPL-SCNC: 4.3 MMOL/L (ref 3.4–5.3)
RBC # BLD AUTO: 3.3 10E12/L (ref 3.8–5.2)
SODIUM SERPL-SCNC: 138 MMOL/L (ref 133–144)
SPECIMEN SOURCE: NORMAL
WBC # BLD AUTO: 4 10E9/L (ref 4–11)

## 2018-03-16 PROCEDURE — 83735 ASSAY OF MAGNESIUM: CPT | Performed by: SURGERY

## 2018-03-16 PROCEDURE — 25000125 ZZHC RX 250: Performed by: STUDENT IN AN ORGANIZED HEALTH CARE EDUCATION/TRAINING PROGRAM

## 2018-03-16 PROCEDURE — 25000132 ZZH RX MED GY IP 250 OP 250 PS 637: Performed by: NURSE PRACTITIONER

## 2018-03-16 PROCEDURE — 12000024 ZZH R&B TRANSPLANT CRITICAL

## 2018-03-16 PROCEDURE — 40000802 ZZH SITE CHECK

## 2018-03-16 PROCEDURE — 84100 ASSAY OF PHOSPHORUS: CPT | Performed by: SURGERY

## 2018-03-16 PROCEDURE — 74177 CT ABD & PELVIS W/CONTRAST: CPT

## 2018-03-16 PROCEDURE — 36592 COLLECT BLOOD FROM PICC: CPT | Performed by: PHYSICIAN ASSISTANT

## 2018-03-16 PROCEDURE — 00000146 ZZHCL STATISTIC GLUCOSE BY METER IP

## 2018-03-16 PROCEDURE — 25000125 ZZHC RX 250: Performed by: PHYSICIAN ASSISTANT

## 2018-03-16 PROCEDURE — 25000125 ZZHC RX 250: Performed by: SURGERY

## 2018-03-16 PROCEDURE — 27210437 ZZH NUTRITION PRODUCT SEMIELEM INTERMED LITER

## 2018-03-16 PROCEDURE — 25000132 ZZH RX MED GY IP 250 OP 250 PS 637: Performed by: PHYSICIAN ASSISTANT

## 2018-03-16 PROCEDURE — 25800025 ZZH RX 258: Performed by: PHYSICIAN ASSISTANT

## 2018-03-16 PROCEDURE — 80048 BASIC METABOLIC PNL TOTAL CA: CPT | Performed by: SURGERY

## 2018-03-16 PROCEDURE — 25000132 ZZH RX MED GY IP 250 OP 250 PS 637: Performed by: SURGERY

## 2018-03-16 PROCEDURE — 27210443 ZZH NUTRITION PRODUCT SPECIALIZED PACKET

## 2018-03-16 PROCEDURE — 85025 COMPLETE CBC W/AUTO DIFF WBC: CPT | Performed by: PHYSICIAN ASSISTANT

## 2018-03-16 PROCEDURE — 25000128 H RX IP 250 OP 636: Performed by: STUDENT IN AN ORGANIZED HEALTH CARE EDUCATION/TRAINING PROGRAM

## 2018-03-16 PROCEDURE — 74019 RADEX ABDOMEN 2 VIEWS: CPT

## 2018-03-16 RX ORDER — IOPAMIDOL 755 MG/ML
65 INJECTION, SOLUTION INTRAVASCULAR ONCE
Status: COMPLETED | OUTPATIENT
Start: 2018-03-16 | End: 2018-03-16

## 2018-03-16 RX ADMIN — OXYCODONE HYDROCHLORIDE 10 MG: 5 SOLUTION ORAL at 04:55

## 2018-03-16 RX ADMIN — PROCHLORPERAZINE MALEATE 10 MG: 5 TABLET, FILM COATED ORAL at 15:20

## 2018-03-16 RX ADMIN — VITAMIN D, TAB 1000IU (100/BT) 5000 UNITS: 25 TAB at 08:15

## 2018-03-16 RX ADMIN — DULOXETINE HYDROCHLORIDE 30 MG: 30 CAPSULE, DELAYED RELEASE ORAL at 22:27

## 2018-03-16 RX ADMIN — POLYETHYLENE GLYCOL 3350 8.5 G: 17 POWDER, FOR SOLUTION ORAL at 08:13

## 2018-03-16 RX ADMIN — DEXTROSE MONOHYDRATE 25 ML: 25 INJECTION, SOLUTION INTRAVENOUS at 14:22

## 2018-03-16 RX ADMIN — VITAMIN D, TAB 1000IU (100/BT) 3000 UNITS: 25 TAB at 08:14

## 2018-03-16 RX ADMIN — Medication 4 ML: at 08:14

## 2018-03-16 RX ADMIN — OXYCODONE HYDROCHLORIDE 10 MG: 5 SOLUTION ORAL at 22:33

## 2018-03-16 RX ADMIN — Medication 7.5 MG: at 22:27

## 2018-03-16 RX ADMIN — ONDANSETRON 4 MG: 4 TABLET, ORALLY DISINTEGRATING ORAL at 13:00

## 2018-03-16 RX ADMIN — Medication 250 MG: at 15:17

## 2018-03-16 RX ADMIN — OXYCODONE HYDROCHLORIDE 5 MG: 5 SOLUTION ORAL at 18:12

## 2018-03-16 RX ADMIN — OXYCODONE HYDROCHLORIDE 5 MG: 5 SOLUTION ORAL at 15:20

## 2018-03-16 RX ADMIN — Medication 250 MG: at 20:13

## 2018-03-16 RX ADMIN — SODIUM CHLORIDE, PRESERVATIVE FREE 66 ML: 5 INJECTION INTRAVENOUS at 14:34

## 2018-03-16 RX ADMIN — OXYCODONE HYDROCHLORIDE 5 MG: 5 SOLUTION ORAL at 13:00

## 2018-03-16 RX ADMIN — Medication 250 MG: at 08:14

## 2018-03-16 RX ADMIN — PANTOPRAZOLE SODIUM 40 MG: 40 INJECTION, POWDER, FOR SOLUTION INTRAVENOUS at 08:13

## 2018-03-16 RX ADMIN — AMITRIPTYLINE HYDROCHLORIDE 100 MG: 50 TABLET, FILM COATED ORAL at 22:28

## 2018-03-16 RX ADMIN — ONDANSETRON 4 MG: 4 TABLET, ORALLY DISINTEGRATING ORAL at 22:27

## 2018-03-16 RX ADMIN — POLYETHYLENE GLYCOL 3350 8.5 G: 17 POWDER, FOR SOLUTION ORAL at 20:14

## 2018-03-16 RX ADMIN — OXYCODONE HYDROCHLORIDE 5 MG: 5 SOLUTION ORAL at 09:35

## 2018-03-16 RX ADMIN — ONDANSETRON 4 MG: 4 TABLET, ORALLY DISINTEGRATING ORAL at 04:55

## 2018-03-16 RX ADMIN — DULOXETINE HYDROCHLORIDE 30 MG: 30 CAPSULE, DELAYED RELEASE ORAL at 08:14

## 2018-03-16 RX ADMIN — IOPAMIDOL 65 ML: 755 INJECTION, SOLUTION INTRAVENOUS at 14:34

## 2018-03-16 ASSESSMENT — PAIN DESCRIPTION - DESCRIPTORS
DESCRIPTORS: CRAMPING
DESCRIPTORS: CRAMPING

## 2018-03-16 NOTE — PLAN OF CARE
Problem: Patient Care Overview  Goal: Plan of Care/Patient Progress Review  Outcome: No Change  T.max 99.1.  Rest VSS.  Pain and mild nausea ongoing.  Getting PO antiemetics and Oxycodone elixer.  Up independently in room.  Voids but not saving.  No BM today.  Down for abdominal xray which showed moderate stool load.  Per order got gastrograffin solution down GT and JT and then went to CT.  TPN running but to be dc'd.  Vivonex TF running at goal but to be dc'd when new TF filter available.  Filter to be changed every 12 hours. On clear diet also but doing only small amt.  Continue to monitor, support as needed.

## 2018-03-16 NOTE — PLAN OF CARE
"Problem: Patient Care Overview  Goal: Plan of Care/Patient Progress Review  Outcome: No Change  BP 99/70 (BP Location: Right arm)  Pulse 104  Temp 98.4  F (36.9  C) (Oral)  Resp 16  Ht 1.575 m (5' 2\")  Wt 47.9 kg (105 lb 11.2 oz)  SpO2 99%  BMI 19.33 kg/m2.  BG range: , insulin gtt adjusted per algorithm 1-2. Abdominal pain decreased with Oxycodone x1.  Nausea controlled with Zofran ODT x1, no emesis.  Pt. up ad nory.  Voiding but not saving, no stools reported this shift.  TPN at 35cc/hour, tube feeds at 40cc/hour (goal rate).  Pt. slept well between cares.  Continue to follow POC & notify with changes.        "

## 2018-03-16 NOTE — PLAN OF CARE
"Problem: Patient Care Overview  Goal: Plan of Care/Patient Progress Review  Outcome: No Change  /63 (BP Location: Right arm)  Pulse 105  Temp 99.3  F (37.4  C) (Oral)  Resp 16  Ht 1.575 m (5' 2\")  Wt 47.9 kg (105 lb 11.2 oz)  SpO2 96%  BMI 19.33 kg/m2    A/Ox4. BPs soft; tachy. Clear sugar-free liquids. Up independently. BGs ; insulin gtt with Q1H checks. L PICC with TPN @ 35mL/hr, TKO + insulin gtt. Zofran x1 and compazine x1 for nausea with good relief. Total of 15mg oxycodone given throughout shift for abdominal cramping pain; good relief. Darryl J tube with TF @ 40mL/hr.  at the bedside this evening. Will continue with plan of care.       "

## 2018-03-16 NOTE — PLAN OF CARE
At noon gastrograffin mixed 50/50 with water per CT.  Half of mix put down JT with TF continuing to run and half into G-tube.  CT notified about time given.

## 2018-03-16 NOTE — PHARMACY-CONSULT NOTE
Pharmacy Tube Feeding Consult    Medication reviewed for administration by feeding tube and for potential food/drug interactions.    Recommendation: No changes are needed at this time.     Pharmacy will continue to follow as new medications are ordered.    Almita Perez RPH on 3/16/2018 at 4:58 PM

## 2018-03-16 NOTE — PROGRESS NOTES
Nutrition note Brief re: Relizorb cartridge for PERT and change in TF formula:    Received page from provider to change TF formula from Vivonex T.E.N. to more calorie concentrated formula with pt now transitioning to Relizorb for PERT. Pt currently on Vivonex T.E.N.at 40 ml/hr (goal 60 ml/hr). Pt not able to tolerate increase rate with Vivonex. Pt also on TPN at 35 ml/hr.     Intervention:    - Will order TF with Peptamen 1.5 (semi-elemental formula, 70% MCT) @ 40 ml/hr.  This will provide 100% of assessed needs.  This will provide 1440 kcals (33  kcal/kg/day), 65 g PRO (1.5 g/kg/day), 739 ml free H2O, 54 g Fat (70% from MCTs), 180 g CHO and no Fiber daily.  - Continue with 30 ml fluid flushes q 4 hrs, additional changes to fluid flushes per MD pending pt's fluid intake  - Use Relizorb cartridge for enzyme per pt care instructions.  - If pt is tolerating Peptamen 1.5 at goal without issues, consider TPN wean pending glycemic control.     If Peptamen 1.5 poorly tolerated, recommend going back to Vivonex TEN with the plan for patient to transition to Vivonex RTF (10% kcal from fat) at discharge - see full recommendations for this in note from 3/13.    RD to continue to follow pt and will assess pt  per protocol. Please contact RD if further nutrition related concerns come up before then.     Darnell William RD LD  Weekday pager - [phone]  Weekend pager - [phone]

## 2018-03-16 NOTE — PROGRESS NOTES
"Care Coordinator  D/I: Pooja Sapp NP, Relizorb form(signed by Dr Rajendra Cruz [place]: Margarita Gerard) and MAR was fax'd to [phone].  P: I have informed Annabelle Baker, and I gave her a copy of the application--so their nurses can look into this and learn about it--- wait for company to approve or deny coverage.  Per Annabelle--[facility] nurse can visit Saturday, 3/17 if home by 4pm--they can also see her on Alvaro 3/18---this if on TPN. She is independent with enteral feeds.  2:30pm Margarita Gerard came to [facility] and brought 1 box of Relizorb (5 day supply) and called the rep for this product, who says he will supply it even thought the auth is NOT approved. Margarita said this product is sold as a \"Medical Device\". Plan is for pt to remain on 7[facility] until Monday 3/19. I informed Margarita that I gave ANNIE Snider the name of this product, for her staff to look up and educate their staff. Will follow.  "

## 2018-03-16 NOTE — PROGRESS NOTES
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 Gastroparesis with gastric pacemaker placed in 2017. S/p TPIAT 6/28/2013.      Cardiorespiratory: Stable   GI/Nutrition:   Severe Malnutrition/malabsorption:  3/12 G tube exchanged for GJ tube 3/12. TPN bridge. Vivonex 40cc/hr, goal of 60ml/h.  Will switch to Peptamen 1.5 with Relizorb (lipase in-line cartridge for TF), goal rate 40ml/h.  Abd pain/cramping:  3/12 scope for FT replacement:  Small bowel appeared diffusely edematous and friable, no ulcers, no esophagitis.  CMV pending.  Pt reports occ use of NSAIDs.  H. Pylori negative.  Antispasmodic trial 3/15 not effective.  CT A/P today to eval for PSBO and stool burden.  Constipation:  3/11 Abd XR showed large stool burden.  Miralax added 3/13.    R/o vitamin deficiency:  Vitamins A and E deficient.  Increased AquADEK.  Heme:  Mild anemia and thrombocytopenia.  Fluid/Electrolytes: TPN, 35ml/h.  Hypokalemia now resolved.    : no [place]  Post-pancreatectomy diabetes: Uncontrolled.  HA1C 15.9.  Insulin gtt.  Also, insulin in TPN.  Home pump broken (sg draining), pt contacting company.  Infection: Empiric treatment for SBBO with flagyl (3/15-).  Prophylaxis: PPI, Anticoagulation: low risk, patient mobile  Pain control: Oxycodone 5-10 every 4 hours prn.   Activity: Ambulate QID  Anticipated LOS/Discharge: 2-3 days    Medical Decision Making: Medium  Subsequent visit 07186 (moderate level decision making)    JUDI/Fellow/Resident Provider: Pooja Sapp NP [phone]    Faculty: Rajendra Cruz MD  Attestation:  Patient has been seen and evaluated by me.   Vital signs, labs, medications and orders were reviewed.   When obtained, diagnostic images were reviewed by me and interpreted as above.   "  The care plan was discussed with the multidisciplinary team and I agree with the findings and plan in this note, with any differences recorded in blue.            Rajendra Cruz MD  Department of Surgery    __________________________________________________________________  Transplant History: Admitted 3/11/2018 for failure to thrive, malabsorption, uncontrolled diabetes  6/28/2013 ([place]), Postoperative day: 1722     Interval History: History is obtained from the patient  Overnight events:   Constant mid-abdominal cramping, unchanged.  No stool x1 day.  + flatus.    ROS:   A 10-point review of systems was negative except as noted above.    Curent Meds:    metroNIDAZOLE  250 mg Per J Tube TID     lipids  250 mL Intravenous Once per day on Mon Wed Fri     multivitamin CF formula  4 mL Per Feeding Tube Daily     DULoxetine  30 mg Oral BID     polyethylene glycol  8.5 g Oral BID     pantoprazole (PROTONIX) IV  40 mg Intravenous Q24H     mirtazapine  7.5 mg Orally disintegrating tablet At Bedtime     sodium chloride (PF)  10 mL Intracatheter Q7 Days     sodium chloride (PF)  3 mL Intracatheter Q8H     amitriptyline  100 mg Oral At Bedtime     cholecalciferol  3,000 Units Oral Daily    And     cholecalciferol  5,000 Units Oral Daily       Physical Exam:     Admit Weight: 44 kg (97 lb)    Current Vitals:   /69 (BP Location: Right arm)  Pulse 104  Temp 99.1  F (37.3  C) (Oral)  Resp 16  Ht 1.575 m (5' 2\")  Wt 47.9 kg (105 lb 8 oz)  SpO2 100%  BMI 19.3 kg/m2    Vital sign ranges:    Temp:  [98.4  F (36.9  C)-99.3  F (37.4  C)] 99.1  F (37.3  C)  Pulse:  [104-116] 104  Heart Rate:  [] 99  Resp:  [16] 16  BP: ()/(62-76) 109/69  SpO2:  [96 %-100 %] 100 %  Patient Vitals for the past 24 hrs:   BP Temp Temp src Pulse Heart Rate Resp SpO2 Weight   03/16/18 1100 109/69 99.1  F (37.3  C) Oral - 99 16 100 % -   03/16/18 0900 - - - - - - - 47.9 kg (105 lb 8 oz)   03/16/18 0700 101/62 99  F (37.2  C) Oral 104 - 16 " 96 % -   03/16/18 0400 99/70 98.4  F (36.9  C) Oral 104 - 16 99 % -   03/15/18 2253 105/63 99.3  F (37.4  C) Oral 105 - 16 96 % -   03/15/18 2003 107/67 99.1  F (37.3  C) Oral 116 - 16 99 % -   03/15/18 1700 109/76 99  F (37.2  C) Oral - 108 16 100 % -     General Appearance: in no apparent distress.   Skin: normal, dry, no rash  Heart: well perfused  Lungs: NLB on RA  Abdomen: soft, rounded/protruding - slightly softer than from previous days, non tender. GJ tube.   : w[place]ster is not present.    Extremities: edema: absent.   PICC line    Data:   CMP    Recent Labs  Lab 03/16/18  0637 03/15/18  0627  03/13/18  0551    140  < > 143   POTASSIUM 4.3 4.1  < > 3.8   CHLORIDE 100 103  < > 112*   CO2 33* 30  < > 22   * 136*  < > 135*   BUN 11 10  < > 8   CR 0.44* 0.38*  < > 0.42*   GFRESTIMATED >90 >90  < > >90   GFRESTBLACK >90 >90  < > >90   JESSICA 8.0* 8.0*  < > 7.9*   MAG 2.1 1.9  < > 1.8   PHOS 4.4 4.1  < > 5.2*   ALBUMIN  --   --   --  2.4*   BILITOTAL  --   --   --  0.4   ALKPHOS  --   --   --  124   AST  --   --   --  49*   ALT  --   --   --  43   < > = values in this interval not displayed.  CBC    Recent Labs  Lab 03/16/18  0637 03/15/18  0627  03/11/18  1425   HGB 10.3* 10.7*  < > 12.7   WBC 4.0 3.7*  < > 6.8   PLT 97* 92*  < > 166   A1C  --   --   --  15.9*   < > = values in this interval not displayed.  Coags    Recent Labs  Lab 03/13/18  0551 03/11/18  1425   INR 1.34* 1.20*   PTT  --  23      Urinalysis  Recent Labs   Lab Test  06/16/17   1115  06/15/17   1132   COLOR  Straw  Yellow   APPEARANCE  Clear  Slightly Cloudy   URINEGLC  >1000*  >1000*   URINEBILI  Negative  Negative   URINEKETONE  Negative  >150*   SG  1.000*  1.024   UBLD  Negative  Trace*   URINEPH  5.0  5.5   PROTEIN  Negative  30*   NITRITE  Negative  Negative   LEUKEST  Negative  Negative   RBCU  <1  8*   WBCU  1  2

## 2018-03-17 LAB
BASOPHILS # BLD AUTO: 0 10E9/L (ref 0–0.2)
BASOPHILS NFR BLD AUTO: 0.5 %
DIFFERENTIAL METHOD BLD: ABNORMAL
EOSINOPHIL # BLD AUTO: 0 10E9/L (ref 0–0.7)
EOSINOPHIL NFR BLD AUTO: 0.8 %
ERYTHROCYTE [DISTWIDTH] IN BLOOD BY AUTOMATED COUNT: 14.5 % (ref 10–15)
GLUCOSE BLDC GLUCOMTR-MCNC: 118 MG/DL (ref 70–99)
GLUCOSE BLDC GLUCOMTR-MCNC: 121 MG/DL (ref 70–99)
GLUCOSE BLDC GLUCOMTR-MCNC: 123 MG/DL (ref 70–99)
GLUCOSE BLDC GLUCOMTR-MCNC: 143 MG/DL (ref 70–99)
GLUCOSE BLDC GLUCOMTR-MCNC: 147 MG/DL (ref 70–99)
GLUCOSE BLDC GLUCOMTR-MCNC: 152 MG/DL (ref 70–99)
GLUCOSE BLDC GLUCOMTR-MCNC: 161 MG/DL (ref 70–99)
GLUCOSE BLDC GLUCOMTR-MCNC: 168 MG/DL (ref 70–99)
GLUCOSE BLDC GLUCOMTR-MCNC: 168 MG/DL (ref 70–99)
GLUCOSE BLDC GLUCOMTR-MCNC: 181 MG/DL (ref 70–99)
GLUCOSE BLDC GLUCOMTR-MCNC: 185 MG/DL (ref 70–99)
GLUCOSE BLDC GLUCOMTR-MCNC: 194 MG/DL (ref 70–99)
GLUCOSE BLDC GLUCOMTR-MCNC: 284 MG/DL (ref 70–99)
GLUCOSE BLDC GLUCOMTR-MCNC: 335 MG/DL (ref 70–99)
GLUCOSE BLDC GLUCOMTR-MCNC: 58 MG/DL (ref 70–99)
GLUCOSE BLDC GLUCOMTR-MCNC: 66 MG/DL (ref 70–99)
GLUCOSE BLDC GLUCOMTR-MCNC: 76 MG/DL (ref 70–99)
GLUCOSE BLDC GLUCOMTR-MCNC: 88 MG/DL (ref 70–99)
GLUCOSE BLDC GLUCOMTR-MCNC: 96 MG/DL (ref 70–99)
HCT VFR BLD AUTO: 30.2 % (ref 35–47)
HGB BLD-MCNC: 9.8 G/DL (ref 11.7–15.7)
IMM GRANULOCYTES # BLD: 0 10E9/L (ref 0–0.4)
IMM GRANULOCYTES NFR BLD: 0 %
LACTATE BLD-SCNC: 1 MMOL/L (ref 0.4–1.9)
LYMPHOCYTES # BLD AUTO: 1.2 10E9/L (ref 0.8–5.3)
LYMPHOCYTES NFR BLD AUTO: 31 %
MAGNESIUM SERPL-MCNC: 2 MG/DL (ref 1.6–2.3)
MCH RBC QN AUTO: 31.5 PG (ref 26.5–33)
MCHC RBC AUTO-ENTMCNC: 32.5 G/DL (ref 31.5–36.5)
MCV RBC AUTO: 97 FL (ref 78–100)
MONOCYTES # BLD AUTO: 0.4 10E9/L (ref 0–1.3)
MONOCYTES NFR BLD AUTO: 11.6 %
NEUTROPHILS # BLD AUTO: 2.1 10E9/L (ref 1.6–8.3)
NEUTROPHILS NFR BLD AUTO: 56.1 %
NRBC # BLD AUTO: 0 10*3/UL
NRBC BLD AUTO-RTO: 0 /100
PHOSPHATE SERPL-MCNC: 3.5 MG/DL (ref 2.5–4.5)
PLATELET # BLD AUTO: 101 10E9/L (ref 150–450)
RBC # BLD AUTO: 3.11 10E12/L (ref 3.8–5.2)
WBC # BLD AUTO: 3.8 10E9/L (ref 4–11)

## 2018-03-17 PROCEDURE — 85025 COMPLETE CBC W/AUTO DIFF WBC: CPT | Performed by: PHYSICIAN ASSISTANT

## 2018-03-17 PROCEDURE — 25000125 ZZHC RX 250: Performed by: PHYSICIAN ASSISTANT

## 2018-03-17 PROCEDURE — 84100 ASSAY OF PHOSPHORUS: CPT | Performed by: PHYSICIAN ASSISTANT

## 2018-03-17 PROCEDURE — 83605 ASSAY OF LACTIC ACID: CPT | Performed by: SURGERY

## 2018-03-17 PROCEDURE — 12000026 ZZH R&B TRANSPLANT

## 2018-03-17 PROCEDURE — 25000131 ZZH RX MED GY IP 250 OP 636 PS 637: Performed by: PHYSICIAN ASSISTANT

## 2018-03-17 PROCEDURE — 25000132 ZZH RX MED GY IP 250 OP 250 PS 637: Performed by: PHYSICIAN ASSISTANT

## 2018-03-17 PROCEDURE — 00000146 ZZHCL STATISTIC GLUCOSE BY METER IP

## 2018-03-17 PROCEDURE — 27210437 ZZH NUTRITION PRODUCT SEMIELEM INTERMED LITER

## 2018-03-17 PROCEDURE — 00000146 ZZHCL STATISTIC GLUCOSE BY METER IP: Performed by: SURGERY

## 2018-03-17 PROCEDURE — 36592 COLLECT BLOOD FROM PICC: CPT | Performed by: SURGERY

## 2018-03-17 PROCEDURE — 25000125 ZZHC RX 250: Performed by: SURGERY

## 2018-03-17 PROCEDURE — 83735 ASSAY OF MAGNESIUM: CPT | Performed by: PHYSICIAN ASSISTANT

## 2018-03-17 PROCEDURE — 25000132 ZZH RX MED GY IP 250 OP 250 PS 637: Performed by: NURSE PRACTITIONER

## 2018-03-17 PROCEDURE — 25000132 ZZH RX MED GY IP 250 OP 250 PS 637: Performed by: SURGERY

## 2018-03-17 PROCEDURE — 36592 COLLECT BLOOD FROM PICC: CPT | Performed by: PHYSICIAN ASSISTANT

## 2018-03-17 RX ADMIN — PROCHLORPERAZINE MALEATE 10 MG: 5 TABLET, FILM COATED ORAL at 08:10

## 2018-03-17 RX ADMIN — Medication 7.5 MG: at 22:08

## 2018-03-17 RX ADMIN — ONDANSETRON 4 MG: 4 TABLET, ORALLY DISINTEGRATING ORAL at 17:05

## 2018-03-17 RX ADMIN — Medication 4 ML: at 08:11

## 2018-03-17 RX ADMIN — DULOXETINE HYDROCHLORIDE 30 MG: 30 CAPSULE, DELAYED RELEASE ORAL at 22:08

## 2018-03-17 RX ADMIN — VITAMIN D, TAB 1000IU (100/BT) 3000 UNITS: 25 TAB at 08:10

## 2018-03-17 RX ADMIN — VITAMIN D, TAB 1000IU (100/BT) 5000 UNITS: 25 TAB at 08:11

## 2018-03-17 RX ADMIN — AMITRIPTYLINE HYDROCHLORIDE 100 MG: 50 TABLET, FILM COATED ORAL at 22:08

## 2018-03-17 RX ADMIN — Medication 250 MG: at 20:04

## 2018-03-17 RX ADMIN — PROCHLORPERAZINE MALEATE 10 MG: 5 TABLET, FILM COATED ORAL at 22:08

## 2018-03-17 RX ADMIN — OXYCODONE HYDROCHLORIDE 10 MG: 5 SOLUTION ORAL at 22:06

## 2018-03-17 RX ADMIN — PANTOPRAZOLE SODIUM 40 MG: 40 INJECTION, POWDER, FOR SOLUTION INTRAVENOUS at 08:10

## 2018-03-17 RX ADMIN — OXYCODONE HYDROCHLORIDE 5 MG: 5 SOLUTION ORAL at 08:11

## 2018-03-17 RX ADMIN — ONDANSETRON 4 MG: 4 TABLET, ORALLY DISINTEGRATING ORAL at 04:31

## 2018-03-17 RX ADMIN — Medication 250 MG: at 08:11

## 2018-03-17 RX ADMIN — OXYCODONE HYDROCHLORIDE 10 MG: 5 SOLUTION ORAL at 04:31

## 2018-03-17 RX ADMIN — OXYCODONE HYDROCHLORIDE 5 MG: 5 SOLUTION ORAL at 11:14

## 2018-03-17 RX ADMIN — Medication 250 MG: at 14:03

## 2018-03-17 RX ADMIN — INSULIN ASPART: 100 INJECTION, SOLUTION INTRAVENOUS; SUBCUTANEOUS at 16:17

## 2018-03-17 RX ADMIN — OXYCODONE HYDROCHLORIDE 5 MG: 5 SOLUTION ORAL at 13:06

## 2018-03-17 RX ADMIN — ONDANSETRON 4 MG: 4 TABLET, ORALLY DISINTEGRATING ORAL at 13:06

## 2018-03-17 RX ADMIN — DULOXETINE HYDROCHLORIDE 30 MG: 30 CAPSULE, DELAYED RELEASE ORAL at 08:11

## 2018-03-17 ASSESSMENT — PAIN DESCRIPTION - DESCRIPTORS: DESCRIPTORS: ACHING;CONSTANT

## 2018-03-17 NOTE — PLAN OF CARE
Problem: Patient Care Overview  Goal: Plan of Care/Patient Progress Review  Outcome: Improving  VS: low grade temps 99.6, 99.4 Tachycardic with activity - triggered sepsis protocol - lactic acid 1.0.   Mental Status: A&Ox4, calm, cooperative   Cardiac: tachycardic with activity, blood pressure soft, systolic 90's, baseline   Respiratory: LS clear, bilaterally   GI/: pt reports voiding adequate amount urine. Abdomen bloated, tender. + BM's, loose, brown in color. GJ tube patent. G is clamped. J tube is receiving continuous tube feed, pt reports intermittent cramping with tube feeding administration. + continuous nausea, baseline, PRN antiemetics x2.   Pain: + abdominal pain, PRN oxycodone given with consistent relief in pain   Diet: Continuous tube feed and clear liquid diet - pt wants to be increased to regular diet, no diet advancement as of now. BS irregular. On and off insulin gtt due to low BS x2. Responded to carbohydrate intake with low sugars.   Mobility: Up independent, ambulating room  Treatment: Plan to switch to home insulin pump, continue increase PO intake  DC plan: Plan to dc home tomorrow

## 2018-03-17 NOTE — PLAN OF CARE
Problem: Patient Care Overview  Goal: Plan of Care/Patient Progress Review  Outcome: No Change  [id number]: A&O x 4; Afebrile, BPs soft, 95/53 and 91/60, OVSS on RA; BGs range from , currently on 1.5 units for BG of 152; Cr. 0.44; c/o pain gave Oxy 10 mg x 2 with good relief, c/o nausea gave zofran 4 mg ODT with good relief, no other complaints; Clear liquid diet with TF of Peptamen 1.5 at 40 mL/hr, experimental cartridge was changed at 0630; DL-PICC infusing NS TKO and insulin; 1 pt. Reported BM, voiding, passing gas; Up independently; Will continue to monitor and update Pancreas service with any significant changes.

## 2018-03-17 NOTE — CONSULTS
Diabetes/Hyperglycemia Management Consult    Chief Complaint post pancreatectomy diabetes, transition to [place] insulin  Consult requested by: Cheryl Garrison NP, attending :Dr. Rajendra Cruz  History of Present Illness Ms. 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  Beverly is now on continuous enteral feeds: Peptamen 1.5 at 40 ml/h.  She had also been on TPN, but this was stopped last evening.  Her enteral feeds have been at goal rate since the evening of 3/14.  Her glucoses and IV insulin rates have been variable, despite being on continuous enteral feeds at the same rate and n.p.o.  From midnight until midmorning today her blood sugars range , IV insulin rates varied 0-3 U/h.  She average 1.21 U/h.    Prior to admission patient was on an ambulatory insulin pump.  Her rates were as follows:  Medtronic Paradigm pump with aspart insulin  BASAL  0.8 units an hour ×24 hours    BOLUS  1 unit per 25 g carbohydrate  1 unit per 100 mg/dL    Target glucose: Unknown  Active insulin 3 hours.    She last saw her endocrinologist in [place], Dr. Myers, in October 2017. On review of outside records: her hemoglobin A1c had increased to 14.5% (prior to that A1c had been 6.2% in May 2016).  At that time Dr. Myers' progress note stated that patient's diabetes was poorly controlled secondary to complete beta cell failure, gastroparesis, hypoglycemia unawareness.  This was leading to poor/unpredictable absorption and poor glucose control.  Patient had been admitted with DKA in June 2017 (the last time our team followed her).  At this visit with her endocrinologist he increased her correction  factor from 150-100.  Beverly says that since then her blood sugars can continue to be erratic and unpredictable.  When she would eat meals she would usually bolus at the end of the meal, or sometimes wait a while to bolus and administer either a square or dual wave bolus, to accommodate for variable glucose rises due to her gastroparesis.  Even with these accommodations her blood sugars were still quite erratic.  She says she gets nervous when her blood sugar is below 120 because it tends to drop fast.  Her ambulatory insulin pump had broke, she received a new one while she has been admitted to the hospital, a Medtronic 5 series.  She has an Enlite CGM at home- but this has been inaccurate.  She is working towards getting on a Dexcom.  She would like to resume using an ambulatory insulin pump, she feels willing and able to manage and ambulatory insulin pump at this time.  Her  will bring her one infusion set to the hospital today.  Plan is for discharge back to [facility] tomorrow.  She has been cleared to start a clear liquid diet this afternoon.    Beverly reports having abdominal cramping since her enteral feeds reached a rate of 30 mL/h and higher.  She feels hungry.  She is having frequent stools.      Recent Labs  Lab 03/17/18  1600 03/17/18  1502 03/17/18  1355 03/17/18  1304 03/17/18  1202 03/17/18  1113  03/16/18  0637  03/15/18  0627  03/14/18  0738  03/13/18  0551  03/12/18  1117  03/12/18  0725   GLC  --   --   --   --   --   --   --  162*  --  136*  --  132*  --  135*  --  91  --  119*   * 284* 181* 58* 147* 168*  < >  --   < >  --   < >  --   < >  --   < >  --   < >  --    < > = values in this interval not displayed.      Diabetes Type: post pancreatectomy diabetes  Diabetes Duration: since 6/2013  Usual Diabetes Regimen:   Ambulatory insulin pump-See settings above in HPI  Ability to [place] Prescribed Regimen: uncertain- worsening glucose control over the past 1.5 years but this seems related  to gastroparesis and beta cell failure, and less to pt not adhering to prescribed regimen.  Diabetes Control:   Lab Results   Component Value Date    A1C 15.9 2018    A1C 13.1 2017    A1C 12.8 06/15/2017    A1C 5.9 2015    A1C 5.4 2015     Diabetes Complications: gastroparesis  History of DKA: yes- 2017  Able to Detect Hypoglycemia: impaired  Usual Diabetes Care Provider: Dr. Lenin Myers in [place], also still follows up with Dr. Power  Factors Impacting Glucose Control: continuous enteral feeds, gastroparesis      Review of Systems  10 point ROS completed with pertinent positives and negatives noted in the HPI    Past medical, family and social histories are reviewed and updated.    Past Medical History  Past Medical History:   Diagnosis Date     Absence of pancreas, acquired total 2013     Bile duct disease      Chronic pain      Chronic pancreatitis (H) 2013     Chronic relapsing pancreatitis (H) 2013     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) 2013     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) 2013     Endometriosis      Endometriosis 2011     Exocrine pancreatic insufficiency 2013     Gastro-oesophageal reflux disease      Gastroparesis      Gastroparesis 2012     Iron deficiency anemia 2013     Other chronic pain      Pancreatic disease     history of pancreatitis     Pancreatic divisum      Pancreatic divisum 10/1/2012     Pneumonia, organism unspecified(486) 2013     PONV (postoperative nausea and vomiting)     Nausea     Portal vein thrombosis 2013     Recurrent acute pancreatitis 2013     Sphincter of Oddi dysfunction 2013     Syncope     X 2     Type 2 diabetes mellitus without complications (H)        Family History  Family History   Problem Relation Age of Onset     C.A.D. Father      GI: V, D, anorexia     Blood Disease Father      lymphoma -  from pulmonary fibrosis.     Malignant Hyperthermia  "Brother      DIABETES Maternal Grandmother      type 2 with older age     DIABETES Maternal Grandfather      type 2 with older age     Cancer - colorectal Maternal Grandfather      DIABETES Paternal Grandmother      type 2 with older age     Connective Tissue Disorder Sister      scleroderma       Social History  Social History     Social History     Marital status:      Spouse name: Chau     Number of children: 0     Years of education: 24     Occupational History     ER physician      Social History Main Topics     Smoking status: Never Smoker     Smokeless tobacco: Never Used     Alcohol use No     Drug use: No     Sexual activity: Yes     Birth control/ protection: Surgical      Comment: Lupron     Other Topics Concern     Blood Transfusions No     Seat Belt Yes     Social History Narrative     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  She is .    Physical Exam  BP 94/60 (BP Location: Left arm)  Pulse 91  Temp 98.8  F (37.1  C) (Oral)  Resp 18  Ht 1.575 m (5' 2\")  Wt 47.9 kg (105 lb 8 oz)  SpO2 98%  BMI 19.3 kg/m2    General:  Pleasant, thin woman, sitting in chair, in no distress.   HEENT: NC/AT, PER and anicteric, non-injected, oral mucous membranes moist.   Lungs: unlabored respiration, no cough  Skin: warm and dry, no obvious lesions  MSK:  fluid movement of all extremities  Lymp: bilateral LE edema   Mental status:  alert, oriented x3, communicating clearly  Psych:  calm, even mood    Laboratory  Recent Labs   Lab Test  03/16/18   0637  03/15/18   0627   NA  138  140   POTASSIUM  4.3  4.1   CHLORIDE  100  103   CO2  33*  30   ANIONGAP  6  7   GLC  162*  136*   BUN  11  10   CR  0.44*  0.38*   JESSICA  8.0*  8.0*     CBC RESULTS:   Recent Labs   Lab Test  03/17/18   0659   WBC  3.8*   RBC  3.11*   HGB  9.8*   HCT  30.2*   MCV  97   MCH  31.5   MCHC  32.5   RDW  14.5   PLT  101*       Liver Function Studies -   Recent Labs   Lab Test  03/13/18   0551   PROTTOTAL  4.7*   ALBUMIN  2.4* "   BILITOTAL  0.4   ALKPHOS  124   AST  49*   ALT  43       Active Medications  Current Facility-Administered Medications   Medication     metroNIDAZOLE (FLAGYL) suspension 250 mg     insulin basal rate from AMBULATORY PUMP     insulin bolus from AMBULATORY PUMP     insulin aspart (NovoLOG) 100 UNIT/ML VIAL FOR FILLING PUMP RESERVOIR     insulin bolus from AMBULATORY PUMP     insulin bolus from AMBULATORY PUMP     dextrose 10 % 1,000 mL infusion     multivitamin CF formula (AquADEKS) liquid 4 mL     acetaminophen **SUCROSE FREE** solution 650mg     DULoxetine (CYMBALTA) EC capsule 30 mg     polyethylene glycol (MIRALAX/GLYCOLAX) Packet 8.5 g     pantoprazole (PROTONIX) 40 mg IV push injection     mirtazapine (REMERON SOL-TAB) solu-half-tab 7.5 mg     metoclopramide (REGLAN) solution 5 mg     lidocaine 1 % 1 mL     lidocaine (LMX4) kit     sodium chloride (PF) 0.9% PF flush 10-20 mL     sodium chloride (PF) 0.9% PF flush 10 mL     prochlorperazine (COMPAZINE) injection 10 mg    Or     prochlorperazine (COMPAZINE) tablet 10 mg    Or     prochlorperazine (COMPAZINE) Suppository 25 mg     sodium chloride (PF) 0.9% PF flush 3 mL     sodium chloride (PF) 0.9% PF flush 3 mL     naloxone (NARCAN) injection 0.1-0.4 mg     ondansetron (ZOFRAN-ODT) ODT tab 4 mg    Or     ondansetron (ZOFRAN) injection 4 mg     insulin 1 unit/mL in saline (NovoLIN, HumuLIN Regular) drip - ADULT IV Infusion     glucose 40 % gel 15-30 g    Or     dextrose 50 % injection 25-50 mL    Or     glucagon injection 1 mg     oxyCODONE (ROXICODONE) solution 5-10 mg     amitriptyline (ELAVIL) tablet 100 mg     rizatriptan (MAXALT) tablet 10 mg     lidocaine (LMX4) kit     cholecalciferol (vitamin D3) tablet 3,000 Units    And     cholecalciferol (vitamin D3) tablet 5,000 Units     No current outpatient prescriptions on file.       Current Diet    Active Diet Order      Clear Liquid Diet      Assessment   Ms. 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  Labile glucoses in context of beta cell failure, gastroparesis and hypoglycemia unawareness.        Plan  Transition to subcutaneous insulin: Ambulatory insulin pump-  New Medtronic ambulatory pump with aspart insulin (programmed new pump this afternoon to be started later today)  BASAL  1 U/h ×24 hours (if glucoses are running consistently higher once diet is started could increase to 1.1 U/h)    BOLUS  1 unit per 20 g carbohydrate  Sensitivity: 1 unit per 75 mg/dL     target glucose: 100-150  Active insulin ×3 hours    Once ambulatory pump is running IV insulin infusion can stop.  After this time blood sugar should be checked before meals, at bedtime (22:00), 2 AM and 5 AM.    Beverly is able, willing and competent to manage ambulatory insulin pump.    Recommend follow up with Dr. Albarado within 1-2 weeks of discharge.    We will continue to follow.    Katelin Mercado PA-C [phone]    Diabetes Management Team job code: [id number]

## 2018-03-17 NOTE — PROGRESS NOTES
Pancreatitis Service - Daily Progress Note  03/17/2018    Assessment & Plan: 42 year old female who presents with chronic diarrhea with rectal prolapse and pain with bowel movement, malabsorption, steatorrhea and failure to thrive after a total pancreatectomy with auto islet transplantation. Gastroparesis with gastric pacemaker placed in 2017. S/p TPIAT 6/28/2013.      Cardiorespiratory: Stable   GI/Nutrition:   Severe Malnutrition/malabsorption:  3/12 G tube exchanged for GJ tube 3/12. TPN bridge-dc'd 3/16. Stopped Vivonex 3/16-transitioned to Peptamen 1.5 with Relizorb (lipase in-line cartridge for TF), goal rate 40ml/h. Currently tolerating.   Abd pain/cramping:  3/12 scope for FT replacement:  Small bowel appeared diffusely edematous and friable, no ulcers, no esophagitis.  CMV pending.  Pt reports occ use of NSAIDs.  H. Pylori negative.  Antispasmodic trial 3/15 not effective.  CT A/P 3/16 with no PSBO, oral contrast is visualized in the distal colon, +stool burden.  Constipation:  3/11 Abd XR showed large stool burden.  3/16 CT-continued stool burden noted. Miralax added 3/13. BM x5 per pt. Steatorrhea per pt.     R/o vitamin deficiency:  Vitamins A and E deficient.  Increased AquADEK.  Heme:  Mild anemia and thrombocytopenia-stable.  Fluid/Electrolytes: CIV. Hypokalemia now resolved.    : no winchester. Voiding without difficulty.   Post-pancreatectomy diabetes: Uncontrolled. -330s.  HA1C 15.9 on admit (3/11).  Currently on insulin gtt. Transition to home pump tonight. DC insulin gtt. Consult Endocrine for management. Will plan to carb coverage with 1 U per 20g Carb.  Infection: Empiric treatment for SBBO with flagyl (3/15-).  Prophylaxis: PPI, Anticoagulation: low risk, patient mobile  Pain control: Oxycodone 5-10 every 4 hours prn.   Activity: Ambulate QID  Anticipated LOS/Discharge: potential DC tomorrow.    Medical Decision Making: Medium  Subsequent visit 90485 (moderate level decision  "making)    JUDI/Fellow/Resident Provider: Cheryl Garrison NP    Faculty: Rajendra Cruz MD  Attestation:  Patient has been seen and evaluated by me.   Vital signs, labs, medications and orders were reviewed.   When obtained, diagnostic images were reviewed by me and interpreted as above.    The care plan was discussed with the multidisciplinary team and I agree with the findings and plan in this note, with any differences recorded in blue.            Rajendra Cruz MD  Department of Surgery    __________________________________________________________________  Transplant History: Admitted 3/11/2018 for failure to thrive, malabsorption, uncontrolled diabetes  6/28/2013 (Islet), Postoperative day: 1723     Interval History: History is obtained from the patient  Overnight events:   Constant mid-abdominal cramping, unchanged. Chronic nausea-unchanged. BM x5 per pt. Reporting hunger.     ROS:   A 10-point review of systems was negative except as noted above.    Curent Meds:    metroNIDAZOLE  250 mg Oral or Feeding Tube TID     insulin bolus from AMBULATORY PUMP   Subcutaneous 4x Daily AC & HS     insulin aspart   Device See Admin Instructions     insulin bolus from AMBULATORY PUMP   Subcutaneous TID AC     multivitamin CF formula  4 mL Per Feeding Tube Daily     DULoxetine  30 mg Oral BID     polyethylene glycol  8.5 g Oral BID     pantoprazole (PROTONIX) IV  40 mg Intravenous Q24H     mirtazapine  7.5 mg Orally disintegrating tablet At Bedtime     sodium chloride (PF)  10 mL Intracatheter Q7 Days     sodium chloride (PF)  3 mL Intracatheter Q8H     amitriptyline  100 mg Oral At Bedtime     cholecalciferol  3,000 Units Oral Daily    And     cholecalciferol  5,000 Units Oral Daily       Physical Exam:     Admit Weight: 44 kg (97 lb)    Current Vitals:   BP 94/60 (BP Location: Left arm)  Pulse 91  Temp 98.8  F (37.1  C) (Oral)  Resp 18  Ht 1.575 m (5' 2\")  Wt 47.9 kg (105 lb 8 oz)  SpO2 98%  BMI 19.3 kg/m2    Vital sign " ranges:    Temp:  [98.5  F (36.9  C)-99.6  F (37.6  C)] 98.8  F (37.1  C)  Pulse:  [91] 91  Heart Rate:  [] 91  Resp:  [16-20] 18  BP: ()/(51-68) 94/60  SpO2:  [97 %-100 %] 98 %  Patient Vitals for the past 24 hrs:   BP Temp Temp src Pulse Heart Rate Resp SpO2   03/17/18 1558 94/60 98.8  F (37.1  C) Oral 91 91 18 98 %   03/17/18 1248 99/62 99.4  F (37.4  C) Oral - 99 16 99 %   03/17/18 1213 102/62 99.4  F (37.4  C) Oral - 101 16 97 %   03/17/18 0752 90/51 99.6  F (37.6  C) Oral - 98 20 100 %   03/17/18 0349 91/60 98.7  F (37.1  C) Oral - 92 16 100 %   03/17/18 0010 95/53 98.5  F (36.9  C) Oral - 99 16 97 %   03/16/18 2003 100/68 98.7  F (37.1  C) Oral - 102 16 100 %     General Appearance: in no apparent distress.   Skin: normal, dry, no rash  Heart: well perfused  Lungs: NLB on RA  Abdomen: soft, nondistended, non tender. GJ tube.   : winchester is not present.    Extremities: edema: absent.   PICC line    Data:   CMP    Recent Labs  Lab 03/17/18  0659 03/16/18  0637 03/15/18  0627  03/13/18  0551   NA  --  138 140  < > 143   POTASSIUM  --  4.3 4.1  < > 3.8   CHLORIDE  --  100 103  < > 112*   CO2  --  33* 30  < > 22   GLC  --  162* 136*  < > 135*   BUN  --  11 10  < > 8   CR  --  0.44* 0.38*  < > 0.42*   GFRESTIMATED  --  >90 >90  < > >90   GFRESTBLACK  --  >90 >90  < > >90   JESSICA  --  8.0* 8.0*  < > 7.9*   MAG 2.0 2.1 1.9  < > 1.8   PHOS 3.5 4.4 4.1  < > 5.2*   ALBUMIN  --   --   --   --  2.4*   BILITOTAL  --   --   --   --  0.4   ALKPHOS  --   --   --   --  124   AST  --   --   --   --  49*   ALT  --   --   --   --  43   < > = values in this interval not displayed.  CBC    Recent Labs  Lab 03/17/18  0659 03/16/18  0637  03/11/18  1425   HGB 9.8* 10.3*  < > 12.7   WBC 3.8* 4.0  < > 6.8   * 97*  < > 166   A1C  --   --   --  15.9*   < > = values in this interval not displayed.  Coags    Recent Labs  Lab 03/13/18  0551 03/11/18  1425   INR 1.34* 1.20*   PTT  --  23      Urinalysis  Recent Labs   Lab  Test  06/16/17   1115  06/15/17   1132   COLOR  Straw  Yellow   APPEARANCE  Clear  Slightly Cloudy   URINEGLC  >1000*  >1000*   URINEBILI  Negative  Negative   URINEKETONE  Negative  >150*   SG  1.000*  1.024   UBLD  Negative  Trace*   URINEPH  5.0  5.5   PROTEIN  Negative  30*   NITRITE  Negative  Negative   LEUKEST  Negative  Negative   RBCU  <1  8*   WBCU  1  2

## 2018-03-17 NOTE — PROGRESS NOTES
at 1600.  Still on insulin drip.  Increased to 5.5 units /hr.  Patient setting up own ambulatory insulin pump.  BG rechecked at 1700 and .  Hospital insulin infusion dc'd.  On home pump now with basal,scale , and bolus rate settings.  Confident with self-management.  Continue to monitor, support.

## 2018-03-18 ENCOUNTER — HOME INFUSION (PRE-WILLOW HOME INFUSION) (OUTPATIENT)
Dept: PHARMACY | Facility: CLINIC | Age: 43
End: 2018-03-18

## 2018-03-18 VITALS
DIASTOLIC BLOOD PRESSURE: 66 MMHG | HEIGHT: 62 IN | WEIGHT: 109.9 LBS | SYSTOLIC BLOOD PRESSURE: 108 MMHG | TEMPERATURE: 98.7 F | HEART RATE: 102 BPM | BODY MASS INDEX: 20.22 KG/M2 | OXYGEN SATURATION: 100 % | RESPIRATION RATE: 18 BRPM

## 2018-03-18 LAB
ALBUMIN SERPL-MCNC: 2.4 G/DL (ref 3.4–5)
ALP SERPL-CCNC: 162 U/L (ref 40–150)
ALT SERPL W P-5'-P-CCNC: 42 U/L (ref 0–50)
ANION GAP SERPL CALCULATED.3IONS-SCNC: 5 MMOL/L (ref 3–14)
AST SERPL W P-5'-P-CCNC: 63 U/L (ref 0–45)
BILIRUB SERPL-MCNC: 0.2 MG/DL (ref 0.2–1.3)
BUN SERPL-MCNC: 10 MG/DL (ref 7–30)
CALCIUM SERPL-MCNC: 7.7 MG/DL (ref 8.5–10.1)
CHLORIDE SERPL-SCNC: 105 MMOL/L (ref 94–109)
CO2 SERPL-SCNC: 29 MMOL/L (ref 20–32)
CREAT SERPL-MCNC: 0.48 MG/DL (ref 0.52–1.04)
GFR SERPL CREATININE-BSD FRML MDRD: >90 ML/MIN/1.7M2
GLUCOSE BLDC GLUCOMTR-MCNC: 124 MG/DL (ref 70–99)
GLUCOSE BLDC GLUCOMTR-MCNC: 180 MG/DL (ref 70–99)
GLUCOSE BLDC GLUCOMTR-MCNC: 191 MG/DL (ref 70–99)
GLUCOSE BLDC GLUCOMTR-MCNC: 226 MG/DL (ref 70–99)
GLUCOSE BLDC GLUCOMTR-MCNC: 93 MG/DL (ref 70–99)
GLUCOSE BLDC GLUCOMTR-MCNC: 95 MG/DL (ref 70–99)
GLUCOSE SERPL-MCNC: 223 MG/DL (ref 70–99)
INR PPP: 1.01 (ref 0.86–1.14)
MAGNESIUM SERPL-MCNC: 2 MG/DL (ref 1.6–2.3)
PHOSPHATE SERPL-MCNC: 2.5 MG/DL (ref 2.5–4.5)
POTASSIUM SERPL-SCNC: 4.4 MMOL/L (ref 3.4–5.3)
PROT SERPL-MCNC: 5 G/DL (ref 6.8–8.8)
SODIUM SERPL-SCNC: 139 MMOL/L (ref 133–144)
TRIGL SERPL-MCNC: 37 MG/DL

## 2018-03-18 PROCEDURE — 84100 ASSAY OF PHOSPHORUS: CPT | Performed by: SURGERY

## 2018-03-18 PROCEDURE — 27210437 ZZH NUTRITION PRODUCT SEMIELEM INTERMED LITER

## 2018-03-18 PROCEDURE — 00000146 ZZHCL STATISTIC GLUCOSE BY METER IP

## 2018-03-18 PROCEDURE — 40000802 ZZH SITE CHECK

## 2018-03-18 PROCEDURE — 25000132 ZZH RX MED GY IP 250 OP 250 PS 637: Performed by: NURSE PRACTITIONER

## 2018-03-18 PROCEDURE — 25000125 ZZHC RX 250: Performed by: SURGERY

## 2018-03-18 PROCEDURE — 84478 ASSAY OF TRIGLYCERIDES: CPT | Performed by: SURGERY

## 2018-03-18 PROCEDURE — 80053 COMPREHEN METABOLIC PANEL: CPT | Performed by: SURGERY

## 2018-03-18 PROCEDURE — 36592 COLLECT BLOOD FROM PICC: CPT | Performed by: SURGERY

## 2018-03-18 PROCEDURE — 25000125 ZZHC RX 250: Performed by: PHYSICIAN ASSISTANT

## 2018-03-18 PROCEDURE — 25000132 ZZH RX MED GY IP 250 OP 250 PS 637: Performed by: PHYSICIAN ASSISTANT

## 2018-03-18 PROCEDURE — 25000132 ZZH RX MED GY IP 250 OP 250 PS 637: Performed by: SURGERY

## 2018-03-18 PROCEDURE — 83735 ASSAY OF MAGNESIUM: CPT | Performed by: SURGERY

## 2018-03-18 PROCEDURE — 85610 PROTHROMBIN TIME: CPT | Performed by: SURGERY

## 2018-03-18 PROCEDURE — 84134 ASSAY OF PREALBUMIN: CPT | Performed by: SURGERY

## 2018-03-18 RX ORDER — OXYCODONE HCL 5 MG/5 ML
5-10 SOLUTION, ORAL ORAL EVERY 4 HOURS PRN
Qty: 2100 ML | Refills: 0 | Status: SHIPPED | OUTPATIENT
Start: 2018-03-18

## 2018-03-18 RX ORDER — MIRTAZAPINE 15 MG/1
7.5 TABLET, FILM COATED ORAL AT BEDTIME
Qty: 30 TABLET | Refills: 1 | Status: SHIPPED | OUTPATIENT
Start: 2018-03-18

## 2018-03-18 RX ORDER — PROCHLORPERAZINE MALEATE 10 MG
10 TABLET ORAL EVERY 6 HOURS PRN
Qty: 120 TABLET | Refills: 0 | Status: SHIPPED | OUTPATIENT
Start: 2018-03-18

## 2018-03-18 RX ORDER — SODIUM BICARBONATE 325 MG/1
325 TABLET ORAL EVERY 4 HOURS PRN
Qty: 180 TABLET | Refills: 1 | Status: SHIPPED | OUTPATIENT
Start: 2018-03-18

## 2018-03-18 RX ORDER — POLYETHYLENE GLYCOL 3350 17 G/17G
8.5 POWDER, FOR SOLUTION ORAL 2 TIMES DAILY
Qty: 7 PACKET | Refills: 1 | Status: SHIPPED | OUTPATIENT
Start: 2018-03-18

## 2018-03-18 RX ORDER — SODIUM BICARBONATE 325 MG/1
325 TABLET ORAL EVERY 4 HOURS
Status: DISCONTINUED | OUTPATIENT
Start: 2018-03-18 | End: 2018-03-18 | Stop reason: HOSPADM

## 2018-03-18 RX ADMIN — ONDANSETRON 4 MG: 4 TABLET, ORALLY DISINTEGRATING ORAL at 03:38

## 2018-03-18 RX ADMIN — ONDANSETRON 4 MG: 4 TABLET, ORALLY DISINTEGRATING ORAL at 12:43

## 2018-03-18 RX ADMIN — DULOXETINE HYDROCHLORIDE 30 MG: 30 CAPSULE, DELAYED RELEASE ORAL at 08:34

## 2018-03-18 RX ADMIN — Medication 4 ML: at 08:32

## 2018-03-18 RX ADMIN — VITAMIN D, TAB 1000IU (100/BT) 5000 UNITS: 25 TAB at 08:33

## 2018-03-18 RX ADMIN — Medication 250 MG: at 14:32

## 2018-03-18 RX ADMIN — OXYCODONE HYDROCHLORIDE 10 MG: 5 SOLUTION ORAL at 03:38

## 2018-03-18 RX ADMIN — ONDANSETRON 4 MG: 4 TABLET, ORALLY DISINTEGRATING ORAL at 16:28

## 2018-03-18 RX ADMIN — OXYCODONE HYDROCHLORIDE 5 MG: 5 SOLUTION ORAL at 12:44

## 2018-03-18 RX ADMIN — OXYCODONE HYDROCHLORIDE 5 MG: 5 SOLUTION ORAL at 16:28

## 2018-03-18 RX ADMIN — PANTOPRAZOLE SODIUM 40 MG: 40 INJECTION, POWDER, FOR SOLUTION INTRAVENOUS at 08:33

## 2018-03-18 RX ADMIN — PROCHLORPERAZINE MALEATE 10 MG: 5 TABLET, FILM COATED ORAL at 08:34

## 2018-03-18 RX ADMIN — OXYCODONE HYDROCHLORIDE 5 MG: 5 SOLUTION ORAL at 08:32

## 2018-03-18 RX ADMIN — Medication 250 MG: at 08:33

## 2018-03-18 RX ADMIN — VITAMIN D, TAB 1000IU (100/BT) 3000 UNITS: 25 TAB at 08:33

## 2018-03-18 ASSESSMENT — PAIN DESCRIPTION - DESCRIPTORS: DESCRIPTORS: ACHING;SHARP

## 2018-03-18 NOTE — PLAN OF CARE
Problem: Patient Care Overview  Goal: Plan of Care/Patient Progress Review  Outcome: No Change  1900-0730: A&O x 4; Tmax 99.2, Tachy, OVSS on RA; BGs 185, 93, 95, 124 and 226, pt on personal ambulatory pump; No other significant labs overnight; c/o pain administered Oxy x 2, Compazine x 1, Zofran x 1; Clear liquid diet with TF at 40; DL-PICC saline locked; 2 BM overnight, voiding; Up independently; Possible d/c today; Will continue to monitor and update Pancreas service with any significant changes.

## 2018-03-18 NOTE — PROGRESS NOTES
Transition Planning Update    D:  Per MD team, patient is ready to discharge today and will not discharge with homeTPN needs.  Patient per MD has a new enteral feeding Peptamin 1.5.  A/P: Care Management Team will continue to follow.    HERNANDEZ Dover.S.N., P.H.N.,R.N.         Pager

## 2018-03-18 NOTE — PROGRESS NOTES
"                          Diabetes Consult Daily  Progress Note          Assessment/Plan:     Ms. 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    Glucoses ranging 90s-200s since transitioning to ambulatory insulin pump yesterday.  This is actually improved from her usual glucoses at home (more labile).  Plan to discharge to home this afternoon.    Plan:  -continue ambulatory insulin pump with current settings, but pt will reduce basal rate to 50% on drive back to [place] while TFs are off, then resume 100% once TFs restarted at home.  BASAL  1 U/h ×24 hours      BOLUS  1 unit per 20 g carbohydrate  Sensitivity: 1 unit per 75 mg/dL      target glucose: 100-150  Active insulin ×3 hours    -monitor glucose ac, hs and set alarm to check 2am BG at home.     (we could not find any option to turn \"on\" square or dual wave bolus in pump, recommend pt call [employer] to troubleshoot, in meantime she will continue bolusing at END of meals).    Outpatient diabetes follow up: with Dr. Albarado within 1-2 weeks.  Plan discussed with patient, bedside RN, and primary team.           Interval History:   The last 24 hours progress and nursing notes reviewed.  Beverly continues to have queasiness with the TFs- but no worse than previous days.  Diet was advanced to mod consistent carb and she is tolerating this.  She is frustrated b/c it does not seem that her new pump has an option for a dual wave bolus or square bolus, which is what she used on her old pump for meal boluses (due to her gastroparesis).  Glucoses overnight were in the 90s-200s.  On her home glucometer BG was down to the 60s at " "one point, but it was not that low on hospital glucometer.  She had a sugar free popsicle (2-3g CHO) at that time- BG then up to 120s, then 200s this morning.  Beverly reports that home, when on regular diet, glucoses tend to run highest overnight, so she does not want to decrease basal rate given that she will be back on her usual diet when she discharges this afternoon.  She plans to set an alarm at 0200 to check BG to make sure it is not running low.    TFs (Peptamen 1.5 at 40ml/h) will be off for the 3.5 h drive back to [place].  Previous basal rate off TFs was 0.8 units/h.  Pt thinks this could still be her basal rate even though she is requiring only slightly more basal insulin (1 unit/h) with continuous feeds running, b/c she questions how much of the enteral feeds she is absorbing (?).  She continues to have frequent stools.  After discussing more pt agreed to run basal rate at 50% (0.5 units/h), if BG start trending high she can increase to 80% of basal.  She will resume at 100% once home and TFs restarted.          Recent Labs  Lab 03/18/18  1144 03/18/18  0801 03/18/18  0719 03/18/18  0535 03/18/18  0332 03/18/18  0316 03/18/18  0157  03/16/18  0637  03/15/18  0627  03/14/18  0738  03/13/18  0551  03/12/18  1117   GLC  --   --  223*  --   --   --   --   --  162*  --  136*  --  132*  --  135*  --  91   * 180*  --  226* 124* 95 93  < >  --   < >  --   < >  --   < >  --   < >  --    < > = values in this interval not displayed.            Review of Systems:   See interval hx          Medications:       Active Diet Order      Moderate Consistent CHO Diet     Physical Exam:  Gen: Alert, sitting on edge of bed, in NAD. Pt's  is present.   HEENT: NC/AT, mucous membranes are moist  Resp: Unlabored  Neuro:oriented x3, communicating clearly  BP 92/73 (BP Location: Right arm)  Pulse 105  Temp 99  F (37.2  C) (Oral)  Resp 16  Ht 1.575 m (5' 2\")  Wt 49.9 kg (109 lb 14.4 oz)  SpO2 100%  BMI 20.1 " kg/m2           Data:     Lab Results   Component Value Date    A1C 15.9 03/11/2018    A1C 13.1 07/06/2017    A1C 12.8 06/15/2017    A1C 5.9 08/05/2015    A1C 5.4 01/12/2015              CBC RESULTS:   Recent Labs   Lab Test  03/17/18   0659   WBC  3.8*   RBC  3.11*   HGB  9.8*   HCT  30.2*   MCV  97   MCH  31.5   MCHC  32.5   RDW  14.5   PLT  101*     Recent Labs   Lab Test  03/18/18   0719  03/16/18   0637   NA  139  138   POTASSIUM  4.4  4.3   CHLORIDE  105  100   CO2  29  33*   ANIONGAP  5  6   GLC  223*  162*   BUN  10  11   CR  0.48*  0.44*   JESSICA  7.7*  8.0*     Liver Function Studies -   Recent Labs   Lab Test  03/18/18   0719   PROTTOTAL  5.0*   ALBUMIN  2.4*   BILITOTAL  0.2   ALKPHOS  162*   AST  63*   ALT  42     Lab Results   Component Value Date    INR 1.01 03/18/2018    INR 1.34 03/13/2018    INR 1.20 03/11/2018    INR 1.13 02/05/2014    INR 3.04 09/17/2013    INR 2.10 07/23/2013    INR 2.26 07/19/2013    INR 1.96 07/18/2013    INR 1.61 07/15/2013    INR 1.58 07/13/2013    INR 2.30 07/12/2013    INR 2.27 07/11/2013       Katelin Mercado PA-C [phone]  Diabetes Management job code [id number]

## 2018-03-18 NOTE — PLAN OF CARE
Problem: Patient Care Overview  Goal: Plan of Care/Patient Progress Review  Outcome: Adequate for Discharge Date Met: 03/18/18  VSS.  BGs well controlled on home ambulatory insulin pump.  Tolerating regular diet.  TF at goal.  Pain and nausea ongoing but well controlled with PO Oxycodone and Zofran ODT.  Up independently.  Decision made to discharge patient home to [place].  Shaylee on-call Care Coordinator assisting with discharge.  Patient to dc with TF orders.  TF and supplies delivered to room at 1630.  Patient is comfortable with admin. And equipment.  Will hook up when home.  Insulin per own pump cut back.  [facility] care to f/u with patient at home.  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  Order for TPN dc'd.  PICC line removed per order.  Patient has all needed scripts.  Left facility accompanied by  at 1700.  To f/u as outpatient.

## 2018-03-18 NOTE — DISCHARGE SUMMARY
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 She has a very complex medical and surgical history and is status post TPAIP in 2013 that did not work, currently on insulin pump. She reports multiple bowel movement per day, in various quantities and consistency, and but oily most of the time. She takes Creon with meals but continues to have diarrhea, weight loss, and malabsorption. She has tried many antidiarrhea regimen that has not help.  Her diarrhea has worsening over the years to a point where she is having a rectal prolapse and pain with bowel movement. She is however able to reduce the rectum when it prolapses.  She also has gastroparesis and is status post gastric pacemaker in 2017 but continues to have persistent nausea requiring anti-emetics with occasional vomiting. She also reported chronic generalized abdominal pain on a daily basis as well as with tube feeding in the past. She denied any recent illness or fevers, denies chest pain, shortness of breath, abdominal pain or urinary symptoms. She however was very nauseous and requested Zofran.     Hospital Course   Beverly Elena was admitted on 3/11/2018.  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      Constipation: CT of the abd/pelvis 3/16.  Large amount of stool predominantly in the transverse and left colon,No  abnormally dilated small bowel loops.    Diabetes  after pancreatectomy- Glucose initially uncontrolled on admission with HgbA1c 14.5%. She was initially placed on an insulin gtt. Endocrine was consulted 3/17 and she was transitioned back to her ambulatory pump when tube feed reached goal rate.  Will discharge with insulin regimen of Basal: 1 U/h x24 hrs. Bolus: 1U per 20g Carb, Sensitivity: 1 unit per 75 mg/dL. Her target glucose will be 100-150.  She will need to follow up with her endocrinologist for closer monitoring.     Nutrition and pancreatic insufficiency-Status post gastric pacemaker in 2017. Her G tube was exchanged on 3/12 for a GJ tube. She was started on Vivonex per home regimen and TPN. She discontinued  TPN and vivonex on 3/16 and transitioned to Peptamen 1.5 @ 40cc/hr which is her goal. Additionally she was initiated on ReliZORB (lipase in line cartridge for TF). She was given a 5 day supply and is awaiting further delivery from supplier. If she is unable to obtain ReliZORB, she was instructed to take bicarb and creon pancreatic enzymes added to formula (see discharge medications). She was tolerating tube feeds and having BMs by the time of discharge. Her diet was advanced to a diabetic diet as tolerated.    Acute on chronic pain-    - During her hospitalization, Beverly experienced pain due to chronic abdominal pain.  Scope 3/12 noted Small bowel appeared diffusely edematous and friable, no ulcers, no esophagitis. H. Pylori negative.  Antispasmodic trial 3/15 not effective.  CT A/P 3/16 with no PSBO, oral contrast is visualized in the distal colon, +stool burden.The pain plan for discharge was discussed with Beverly and the plan was created in a collaborative fashion.    - Opioids prescribed on discharge: Oxycodone 5-10mg Q4 hrs PRN.  - Duration of opioids after discharge: 1 month supply with no refills  - Bowel regimen: miralax      For questions about hospitalization, please page Pancreas Transplant fellow  [phone].  For questions about  post-hospital medical regimen, call [facility] Transplant coordinator Margarita Gerard [phone].    Cheryl Garrison    Significant Results and Procedures   3/12/18: EGD:          - Small bowel appeared diffusely edematous and friable                             - Placement of 18 Fr by 43 cm low profile GJ tube with difficulty (related to the PEG site which was not close to the antrum)    Pending Results   These results will be followed up by coordinator,  Unresulted Labs Ordered in the Past 30 Days of this Admission     Date and Time Order Name Status Description    3/17/2018 2330 Prealbumin In process           Code Status   Full Code    Primary Care Physician   Marleny Hathaway    Physical Exam   Temp: 98.7  F (37.1  C) Temp src: Oral BP: 108/66 Pulse: 102 Heart Rate: 102 Resp: 18 SpO2: 100 % O2 Device: None (Room air)    Vitals:    03/15/18 1013 03/16/18 0900 03/18/18 0900   Weight: 47.9 kg (105 lb 11.2 oz) 47.9 kg (105 lb 8 oz) 49.9 kg (109 lb 14.4 oz)     Vital Signs with Ranges  Temp:  [98.7  F (37.1  C)-99  F (37.2  C)] 98.7  F (37.1  C)  Pulse:  [102] 102  Heart Rate:  [] 102  Resp:  [16-18] 18  BP: ()/(66-73) 108/66  SpO2:  [100 %] 100 %  I/O last 3 completed shifts:  In: 1570 [I.V.:200; NG/GT:210]  Out: -     General Appearance: in no apparent distress.   Skin: normal, dry, no rash  Heart: well perfused  Lungs: NLB on RA  Abdomen: soft, rounded/protruding - slightly softer than from previous days, non tender. GJ tube.   : w[place] is not present.    Extremities: edema: absent.   PICC line       Time Spent on this Encounter   I, Cheryl Garrison, personally saw the patient today and spent greater than 30 minutes discharging this patient.      I evaluated the patient today.  I reviewed the discharge plan with the fellow, and agree with the final assessment and plan for discharge as noted in the discharge summary.  I personally spent  30 minutes or less  today on discharge activities for this  patient.            Rajendra Cruz MD  Department of Surgery      Discharge Disposition   Discharged to home  Condition at discharge: Stable    Consultations This Hospital Stay   NUTRITION SERVICES ADULT IP CONSULT  PHARMACY/NUTRITION TO START AND MANAGE TPN  PHARMACY IP CONSULT  GI PANCREATICOBILIARY ADULT IP CONSULT  VASCULAR ACCESS ADULT IP CONSULT  VASCULAR ACCESS CARE ADULT IP CONSULT  PHARMACY IP CONSULT  PHYSICAL THERAPY ADULT IP CONSULT  PHARMACY IP CONSULT  VASCULAR ACCESS CARE ADULT IP CONSULT  CNS DIABETES IP CONSULT  DIABETES EDUCATION IP CONSULT  PHARMACY IP CONSULT  PHARMACY IP CONSULT  PHARMACY IP CONSULT  ENDOCRINE DIABETES ADULT IP CONSULT    Discharge Orders     Home infusion referral     Discharge Instructions   Nutrition labs Q week Monday.   Keep a food diary  Keep a stool diary  Recommend three times/week weights     Reason for your hospital stay   Malnutrition   Post-pancreatectomy diabetes  Failure to thrive   Malnutrition  Diabetic Ketoacidosis     Adult [facility] Follow-up and recommended labs and tests   Recommend Labs Q week  Monday starting 3/26 to include Prealbumin, CBC, CMP    Appointments on [facility] and/or [facility] (with [facility] or [facility] provider or service). Call [phone] if you haven't heard regarding these appointments within 7 days of discharge.     Activity   Your activity upon discharge: activity as tolerated, no driving while on narcotics     Monitor and record   weight three times/ week.  Call weight recordings to coordinator, Margarita Gerard     Discharge Instructions   Resume prior to admission diet  Enteral nutrition: Peptamen 1.5 @ 40cc/hr + ReliZORB    If unable to obtain RELiZORB:   (please copy and paste administration instructions into each order)  A) Sodium Bicarb tablet (325 mg), 1 tablet q 4 hrs via Jtube. Administration Instructions: Crush 1 tablet and mix into 15 ml of warm water and use this solution to mix with Creon pancreatic enzymes. DO NOT administer  directly into Jtube (to be mixed into TF formula with Creon enzyme - see Creon enzyme order)   B) Creon 24, 1 capsules q 4 hrs via Jtube. Administration Instructions:   **Open capsule and empty contents into 15 ml sodium bicarb solution (see sodium bicarb order), let dissolve for about 20-30 minutes and then add this solution to the amount of TF formula hung in TF bag every 4 hrs (i.e., once TF @ goal infusion 40 ml/hr will mix 2 capsules into 160 ml of TF formula every 4 hrs).     Full Code       Discharge Medications   Current Discharge Medication List      START taking these medications    Details   oxyCODONE (ROXICODONE) 5 MG/5ML solution Take 5-10 mLs (5-10 mg) by mouth every 4 hours as needed for moderate to severe pain  Qty: 2100 mL, Refills: 0    Associated Diagnoses: Chronic abdominal pain      acetaminophen (TYLENOL) 32 mg/mL solution Take 20.3 mLs (650 mg) by mouth every 6 hours as needed for mild pain or fever  Qty: 850 mL, Refills: 0    Associated Diagnoses: Chronic abdominal pain      prochlorperazine (COMPAZINE) 10 MG tablet Take 1 tablet (10 mg) by mouth every 6 hours as needed for vomiting  Qty: 120 tablet, Refills: 0    Associated Diagnoses: Absence of pancreas, acquired total; Chronic nausea      polyethylene glycol (MIRALAX/GLYCOLAX) Packet Take 8.5 g by mouth 2 times daily  Qty: 7 packet, Refills: 1    Associated Diagnoses: Constipation, unspecified constipation type      multivitamin CF formula (AQUADEKS) LIQD liquid 4 mLs by Per Feeding Tube route daily  Qty: 120 mL, Refills: 2    Associated Diagnoses: Absence of pancreas, acquired total      metroNIDAZOLE (FLAGYL) 50 mg/mL SUSP 5 mLs (250 mg) by Oral or Feeding Tube route 3 times daily for 6 days  Qty: 90 mL, Refills: 0    Associated Diagnoses: Small intestinal bacterial overgrowth      mirtazapine (REMERON) 15 MG tablet Take 0.5 tablets (7.5 mg) by mouth At Bedtime  Qty: 30 tablet, Refills: 1    Associated Diagnoses: Chronic abdominal pain;  Absence of pancreas, acquired total      !! insulin bolus from AMBULATORY PUMP Insulin dose = 1 units for 20 grams of carbohydrate      !! insulin bolus from AMBULATORY PUMP Inject Subcutaneous 3 times daily (before meals) Insulin dose = 1 units for 20 grams of carbohydrate      !! insulin bolus from AMBULATORY PUMP Inject Subcutaneous 4 times daily (before meals and nightly) Bolus-Correction 1 unit(s) to lower blood glucose by 75 mg/dL      sodium bicarbonate 325 MG tablet 1 tablet (325 mg) by Per J Tube route every 4 hours as needed (if ReliZORB unavailable) If ReliZORB unavailable: Crush 1 tablet and mix into 15 ml of warm water and use this solution to mix with Creon pancreatic enzymes. DO NOT administer directly into Jtube (to be mixed into TF formula with Creon enzyme - see Creon enzyme order)  Qty: 180 tablet, Refills: 1    Associated Diagnoses: Absence of pancreas, acquired total       !! - Potential duplicate medications found. Please discuss with provider.      CONTINUE these medications which have CHANGED    Details   !! amylase-lipase-protease (CREON) 14634-63845 UNITS CPEP per EC capsule Open capsule and empty contents into 15 ml sodium bicarb solution (see sodium bicarb order), let dissolve for about 20-30 minutes and then add this solution to the amount of TF formula hung in TF bag every 4 hrs (i.e., once TF @ goal infusion 40 ml/hr will mix 2 capsules into 160 ml of TF formula every 4 hrs).  Qty: 450 capsule, Refills: 0    Associated Diagnoses: Pancreatic insufficiency      !! amylase-lipase-protease (CREON 24) 62601-70467 UNITS CPEP per EC capsule Take 1 capsule (24,000 Units) by mouth every 4 hours as needed If ReliZORB unavailable: Open capsule and empty contents into 15 ml sodium bicarb solution (see sodium bicarb order), let dissolve for about 20-30 minutes and then add this solution to the amount of TF formula hung in TF bag every 4 hrs (i.e., once TF @ goal infusion 40 ml/hr will mix 2  capsules into 160 ml of TF formula every 4 hrs).  Qty: 180 capsule, Refills: 1    Associated Diagnoses: Absence of pancreas, acquired total       !! - Potential duplicate medications found. Please discuss with provider.      CONTINUE these medications which have NOT CHANGED    Details   DULOXETINE HCL PO Take 30 mg by mouth 2 times daily      ergocalciferol (DRISDOL) 8000 UNIT/ML drops Take 0.75 mLs (6,000 Units) by mouth daily  Qty: 30 mL, Refills: 11    Comments: Needs drop formulation due to malabsoroption issues.  Associated Diagnoses: Exocrine pancreatic insufficiency      amitriptyline (ELAVIL) 100 MG tablet Take 1 tablet (100 mg) by mouth At Bedtime  Qty: 30 tablet, Refills: 11    Associated Diagnoses: Chronic pancreatitis (H); Acute post-operative pain; Chronic abdominal pain; Encounter for long-term opiate analgesic use      esomeprazole (NEXIUM) 40 MG capsule Take 1 capsule (40 mg) by mouth 2 times daily Take 30-60 minutes before eating.  Qty: 90 capsule, Refills: 3    Associated Diagnoses: Diarrhea      Insulin Aspart (INSULIN PUMP - OUTPATIENT) 1 unit/hour      Probiotic Product (PROBIOTIC DAILY PO) Take 2 tablets by mouth daily      Cholecalciferol (VITAMIN D3 PO) Take 8,000 Units by mouth daily       ondansetron (ZOFRAN-ODT) 8 MG disintegrating tablet Every 4-6 hours as needed      metoclopramide (REGLAN) 10 MG tablet Take 1 tablet by mouth every 6 hours as needed      ACCU-CHEK FASTCLIX LANCETS MISC 1 each every 2 hours as needed.  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)      rizatriptan (MAXALT) 10 MG tablet Take 10 mg by mouth at onset of headache     Associated Diagnoses: Sphincter of Oddi dysfunction; Pancreatic divisum; Recurrent acute pancreatitis         STOP taking these medications       amylase-lipase-protease (VIOKACE) 28515 UNITS TABS tablet Comments:   Reason for Stopping:         Multiple Vitamins-Minerals (ADEKS) chewable tablet Comments:   Reason for Stopping:             Allergies   Allergies   Allergen Reactions     Penicillin G Anaphylaxis     Penicillins Anaphylaxis     06/2013 - pt tolerated ertapenem     Corticosteroids Other (See Comments)     All oral,IV and injectable steroids are contraindicated (unless in life threatening situations) in Islet Auto transplant recipients. They can cause irreversible loss of islet cell function. Please contact patients transplant care coordinator LAUREN Gerard RN at /pager   and Endocrinologist prior to administration.     Ertapenem Other (See Comments)     Elevated LFT's  Elevated LFT's     Merrem [Meropenem] Other (See Comments)     Elevated LFTs >3x, happened with invanz too  Elevated LFT's     Other [Seasonal Allergies]      Orange causes rash, NV     Vancomycin Other (See Comments)     osiel syndrome     Vancomycin Other (See Comments)     Gets \"red lizandro syndrome\"     Data   Results for orders placed or performed during the hospital encounter " of 03/11/18   XR Abdomen 2 Views    Narrative    EXAM: XR ABDOMEN 2 VW  3/11/2018 3:50 PM      HISTORY: flat and upright - to eval for any sign of fecal overload or  small bowel obstruction;     COMPARISON: 6/15/2017    FINDINGS: Upright and supine views of the abdomen obtained. Unchanged  gastric pacemaker and spinal cord stimulator devices. Gastric tube  projects over the expected location of the stomach. Surgical clips  project over the upper quadrants bilaterally. Moderate colonic stool  burden. No air dilated bowel. No free air. No pneumatosis or portal  venous gas. No pneumobilia. Lung bases are clear.       Impression    IMPRESSION:   1. Nonobstructive bowel gas pattern.  2. Moderate colonic stool burden.    I have personally reviewed the examination and initial interpretation  and I agree with the findings.    HERMES MARINA MD   XR Chest 1 View    Narrative    Study: XR CHEST 1 VW 3/12/2018 9:48 AM    Comparison: Chest radiograph from 6/16/2017.    History: RN placed PICC - verify tip placement;     Findings:   PA chest radiograph is obtained and reviewed. Left upper extremity  approach PICC tip is at the atriocaval junction. No acute airspace  opacities. No pneumothorax. No pleural effusions. Prominent perihilar  vasculature. Cardiac mediastinal silhouette are within normal limits.  Unchanged gastric neurostimulator. Unchanged neurostimulator leads in  the high/mid thoracic spine. Postsurgical changes of the upper abdomen  with surgical clips. No acute osseous abnormalities.      Impression    Impression: Left approximately PICC tip is at the atriocaval junction.  No acute cardiopulmonary findings.    I have personally reviewed the examination and initial interpretation  and I agree with the findings.    CUATE KELLER MD   XR Surgery RAS Fluoro G/T 5 Min w Stills    Narrative    This exam was marked as non-reportable because it will not be read by a   radiologist or a [place] non-radiologist  provider.             XR Abdomen Port 1 View    Narrative    XR ABDOMEN PORT 1 VW  3/13/2018 4:42 PM      HISTORY: eval for stool burden;     COMPARISON: 3/11/2018    FINDINGS: Unchanged gastric pacemaker and spinal cord stimulator.  Gastric tube projects over the location of the stomach. Postsurgical  abdomen with surgical clips over the upper abdomen. Surgical drain  over the right upper quadrant. Nonobstructive bowel gas pattern.  Moderate colonic stool burden. No pneumatosis or portal venous gas.      Impression    IMPRESSION:   1. Moderate colonic stool burden.  2. Nonobstructive bowel gas pattern.    I have personally reviewed the examination and initial interpretation  and I agree with the findings.    CUATE KELLER MD   XR Abdomen 2 Views    Narrative    Exam: XR ABDOMEN 2 VW, 3/16/2018 9:58 AM    Indication: eval for residual stool burden, psbo;     Comparison: Abdominal radiographs dated 3/13/2018.    Findings:   Single upright view of the abdomen. Gastrojejunostomy tube stable in  position compared to prior. Gastric pacemaker and spinal cord  stimulator again noted. Multiple surgical clips noted in the right and  left upper quadrants, the right lower quadrant, and over the pelvis.  Large volume of stool noted in the descending colon. There are several  gas-distended loops of bowel, and an air-fluid level present in the  mid abdomen. No pneumatosis, portal venous gas or free air in the  abdomen. Visualized lung fields are clear. Cardiac silhouette is  unremarkable. No acute osseous pathology.      Impression    Impression:   1. Large volume of stool in the descending colon.  2. Distended loop of bowel in the midabdomen with air-fluid level may  be early sign of obstruction. Recommend clinical correlation.  3. Stable support devices as described above.    I have personally reviewed the examination and initial interpretation  and I agree with the findings.    JAE MOSLEY MD   CT Abdomen Pelvis w Contrast     Narrative    EXAMINATION: CT ABDOMEN PELVIS W CONTRAST, 3/16/2018 3:00 PM    TECHNIQUE:  Helical CT images from the lung bases through the  symphysis pubis were obtained  with oral and IV contrast. Contrast  dose: iopamidol (ISOVUE-370) solution 65 mL. Gastrografin enteric  contrast was also used.    COMPARISON: Abdominal x-ray 3/16/2018    HISTORY: Contrast though G and J tube.   Please do not stop tube  feeds.  Eval for PSBO, eval for stool burden.  Hx abd pain,  dysmotility, malnutrition.;     FINDINGS:    Lung bases: Are clear.    Abdomen and pelvis: Extensive post surgical changes of  cholecystectomy, appendectomy, right salpingectomy and left  salpingo-oophorectomy, pancreatectomy with islet cell transplant.  Percutaneous gastrojejunostomy tube tip is in the proximal jejunum.  Left lower quadrant anterior abdominal wall gastric stimulator. Right  lower posterior abdominal wall spinal stimulator.    The liver, spleen, kidneys and adrenal glands are unremarkable. Large  amount of stool predominantly in the transverse and left colon. No  abnormally dilated small bowel loops.    Bones and soft tissues: No worrisome osseous lesion. Generalized  anasarca.      Impression    IMPRESSION:   1. No evidence of abnormally dilated small bowel loops to suggest  partial or complete small bowel obstruction, in addition, oral  contrast is visualized in the distal colon. Large amount of stool  predominantly in the transverse and left colon, nonspecific, can be  seen with constipation.  2. Generalized anasarca.    I have personally reviewed the examination and initial interpretation  and I agree with the findings.    JAE MOSLEY MD

## 2018-03-19 ENCOUNTER — TELEPHONE (OUTPATIENT)
Dept: TRANSPLANT | Facility: CLINIC | Age: 43
End: 2018-03-19

## 2018-03-19 ENCOUNTER — HOME INFUSION (PRE-WILLOW HOME INFUSION) (OUTPATIENT)
Dept: PHARMACY | Facility: CLINIC | Age: 43
End: 2018-03-19

## 2018-03-19 ENCOUNTER — CARE COORDINATION (OUTPATIENT)
Dept: CARE COORDINATION | Facility: CLINIC | Age: 43
End: 2018-03-19

## 2018-03-19 LAB
GLUCOSE BLDC GLUCOMTR-MCNC: 141 MG/DL (ref 70–99)
PREALB SERPL IA-MCNC: 13 MG/DL (ref 15–45)

## 2018-03-19 NOTE — TELEPHONE ENCOUNTER
Called Beverly to follow up her hospital SC. I will be mailing her additional relizorb cartridges today. She plans to get weight/labs weekly on a Monday and send me her food diaries.She will e-mail me her lab details later today.

## 2018-03-19 NOTE — PROGRESS NOTES
Patient was contacted by an RN for post DC follow up so no duplicate post DC follow up call will be made            Caty Gerard RN   Transplant        Called Beverly to follow up her hospital DC. I will be mailing her additional relizorb cartridges today. She plans to get weight/labs weekly on a Monday and send me her food diaries.She will e-mail me her lab details later today.

## 2018-03-20 ENCOUNTER — TELEPHONE (OUTPATIENT)
Dept: TRANSPLANT | Facility: CLINIC | Age: 43
End: 2018-03-20

## 2018-03-20 NOTE — TELEPHONE ENCOUNTER
Today I called Beverly' endocrinologist and requested an early appointment. DC summary , med list and labs faxed.Lab orders also sent to [facility] ph [phone] fax [phone]    E-mail above received today.     The lab that I would go to is [facility], [address] [place] [address].  The phone number is [phone] and fax is [phone].     I was wondering if you have any suggestions on how to get all the fluid off my legs and abdomen.  They started to swell in the hospital but have steadily gotten worse since Sunday.  I have been taking lasix with no results and using compression stockings if I can get them on.  They are so tight I can't hardly bend my legs.   I am sure it will eventually go away but if you have any other suggestions.     Thanks again for all the help,  Beverly

## 2018-03-22 ENCOUNTER — HOME INFUSION (PRE-WILLOW HOME INFUSION) (OUTPATIENT)
Dept: PHARMACY | Facility: CLINIC | Age: 43
End: 2018-03-22

## 2018-03-22 NOTE — PROGRESS NOTES
Therapy: TPN and enteral  Insurance: [employer]  Ded: $2,000  Met: $435  Co-Insurance: 80%  Max Out of Pocket: $5,000  Met: $729    Please contact Intake with any questions, [phone] or In Basket pool, [facility] ([id number]).    [facility] 7A: in reference to admission date 03/11 to check IV coverage.

## 2018-03-23 ENCOUNTER — TELEPHONE (OUTPATIENT)
Dept: TRANSPLANT | Facility: CLINIC | Age: 43
End: 2018-03-23

## 2018-03-23 NOTE — TELEPHONE ENCOUNTER
E-mail received from patients  7:30 3/22/18    Margarita Paul is hospitalized in [place].  She has not been able to get rid of the fluids (roughly 30 lbs) and also has a small pulmonary embolism.  Thought you should know.     Chau

## 2018-03-27 ENCOUNTER — TELEPHONE (OUTPATIENT)
Dept: TRANSPLANT | Facility: CLINIC | Age: 43
End: 2018-03-27

## 2018-03-27 NOTE — TELEPHONE ENCOUNTER
Spoke to Beverly in planned follow up. She has been in [facility] for 6 days with excessive edema from her nipple line down and a small PE. + 30 pounds. She is intravascularly dry with low BP> On Coumadin and Lovenox.Can only tolerate tube feeds at  30ccc/hr, still using Relizorb cartridges.

## 2018-03-28 ENCOUNTER — HOME INFUSION (PRE-WILLOW HOME INFUSION) (OUTPATIENT)
Dept: PHARMACY | Facility: CLINIC | Age: 43
End: 2018-03-28

## 2018-03-30 NOTE — PROGRESS NOTES
This is a recent snapshot of the patient's [facility] medical record.  For current drug dose and complete information and questions, call [phone] or In Basket pool, [facility] ([id number])  CSN Number:  [id number]

## 2018-04-13 ENCOUNTER — HOME INFUSION (PRE-WILLOW HOME INFUSION) (OUTPATIENT)
Dept: PHARMACY | Facility: CLINIC | Age: 43
End: 2018-04-13

## 2018-04-13 LAB
GLUCOSE BLDC GLUCOMTR-MCNC: 53 MG/DL (ref 70–99)
GLUCOSE BLDC GLUCOMTR-MCNC: 81 MG/DL (ref 70–99)

## 2018-04-16 ENCOUNTER — DOCUMENTATION ONLY (OUTPATIENT)
Dept: TRANSPLANT | Facility: CLINIC | Age: 43
End: 2018-04-16

## 2018-04-16 ENCOUNTER — TELEPHONE (OUTPATIENT)
Dept: TRANSPLANT | Facility: CLINIC | Age: 43
End: 2018-04-16

## 2018-04-16 NOTE — TELEPHONE ENCOUNTER
DATE:  4/16/2018   TIME OF RECEIPT FROM LAB:  1509  LAB TEST:  Glucose  LAB VALUE:  462  LAB TEST: hemoglobin  LAB VALUE: 6.6  LAB TEST: HEMATOCRIT  LAB VALUE: 19.7  RESULTS GIVEN WITH READ-BACK TO (PROVIDER):  Margarita Gerard RN  TIME LAB VALUE REPORTED TO PROVIDER:   5084

## 2018-04-16 NOTE — Clinical Note
Just LIZBETHI, oddly she was on my list to call and check in today.Last I heard she was being anticoagulated after a small PE so somewhat scary.

## 2018-04-16 NOTE — PROGRESS NOTES
"Received report of critical lab values( Hgb 6.6 and BS ) from [facility]. Unable to contact pt so called her  Chau. He stated she was currently in the ER about to be transferred to ICU. Spoke briefly to him and he said \" the same things, dizzy , weak, fell over the weekend.\"    "

## 2018-04-17 NOTE — PROGRESS NOTES
This is a recent snapshot of the patient's [facility] medical record.  For current drug dose and complete information and questions, call [phone] or In [facility] pool, [facility] ([id number])  CSN Number:  [id number]

## 2018-04-20 NOTE — PROGRESS NOTES
This is a recent snapshot of the patient's [facility] medical record.  For current drug dose and complete information and questions, call [phone] or In [facility] pool, [facility] ([id number])  CSN Number:  [id number]

## 2018-05-02 ENCOUNTER — TELEPHONE (OUTPATIENT)
Dept: TRANSPLANT | Facility: CLINIC | Age: 43
End: 2018-05-02

## 2018-05-02 NOTE — TELEPHONE ENCOUNTER
Beverly:    I got a call from [facility] with a  critical   BS of 500.    Apparently the labs were ordered under Dr Cruz( not sure how this still happening )  Can you call me to let me know how you are and that you are aware of  your BS ( and are going to treat it )    I did leave you a message on your phone also,    Take care,  Margarita Gerard RN, BSC  Phone    Toll free [phone]  Fax    [facility]     Lead Transplant Coordinator for Chronic pancreatitis /Islet  Transplant Programs  [address] [place] [address] Room 2-200. [address] [address]

## 2018-05-10 NOTE — PROGRESS NOTES
This is a recent snapshot of the patient's [facility] medical record.  For current drug dose and complete information and questions, call [phone] or In Basket pool, [facility] ([id number])  CSN Number:  [id number]

## 2018-05-24 ENCOUNTER — TELEPHONE (OUTPATIENT)
Dept: TRANSPLANT | Facility: CLINIC | Age: 43
End: 2018-05-24

## 2018-05-24 NOTE — TELEPHONE ENCOUNTER
COMFORT Paul    Wondering how you are doing . I got a random denial for Relizorb on my fax. Are you actually still using this ?    Would love to hear how you are doing ,    Best wishes,  Margarita

## 2018-06-08 ENCOUNTER — TELEPHONE (OUTPATIENT)
Dept: TRANSPLANT | Facility: CLINIC | Age: 43
End: 2018-06-08

## 2018-06-08 NOTE — TELEPHONE ENCOUNTER
I really did not get any literature or how to reorder.  The only thing in the box were the boxes of Relizorb. I wasn t even thinking of reorder and I didn t ask how to get more or a return phone number. Sorry, I wasn t feeling good and just wasn t using my brain.  On Jun 5, 2018, at 09:14, Margarita Gerard <[email or web address]> wrote:    Before I start working on this ~ have you called to request new samples ?     From: Beverly Fermin [[email or web address]]   Sent: Monday, June 04, 2018 4:03 PM  To: Margarita Gerard  Subject: Re: PHI: Update ?     I did receive the samples.  They sent me two boxes but I am just almost out of them since it has been a couple of months now.  They did not say if they were going to continue with providing the samples if my insurance wouldn t pay.     On Jun 4, 2018, at 15:51, Margarita Gerard <[email or web address]> wrote:        Thanks for the update     I am sure I heard that the company who make relizorb had left you a message asking you to call---they needed to confirm delivery of samples.     Did you ever get this message ?     If not I will try and back track and see what I can find out.  From: Beverly Fermin [mailto:[email or web address]]   Sent: Monday, June 04, 2018 2:58 PM  To: Margarita Gerard  Subject: Re: PHI: Update ?     Addy Avila,     Well, things are going ok I guess but not great.  I am still using the Relizorb product and am almost out.  I am not sure why my insurance denied the product.  All they told me is they needed more information from [facility].  I am still doing both tube feeding and TPN.  Starting to get a bit more energy from them.  Labs are starting to improve.      I recently had a significant GI bleed.  I ended up in the ICU.  They could not find the source of the bleed.  Endoscopy and Colonoscopy were done but it is somewhere in between the two scopes.  Still having significant abdominal pain and cramping.       I am still on lovenox because of a  PE.  Coumadin was absorbed erratically and contributed to GI bleed so doing a lower dose of lovenox twice a day instead.       Thankfully the last few weeks have been fairly uneventful.  I still have several days that I just feel crappy and not sure why. If I eat, I still have oily, very loose stools.  I am not eating much so at least they have slowed a bit/having fewer of them in a day.       My blood sugars are better.  I got a new pump and sensor that actually work.  At least I have one thing under better control!  Sugars are still a bit all over the place if I eat but at least now I know when they are going up or down.     I guess that is it for now.  Have a wonderful week,  Beverly  On May 24, 2018, at 15:15, Margarita Gerard [email or web address] wrote:     COMFORT Paul     Wondering how you are doing . I got a random denial for Relizorb on my fax. Are you actually still using this ?     Would love to hear how you are doing ,     Best wishes,  Margarita Gerard RN

## 2018-09-28 ENCOUNTER — TELEPHONE (OUTPATIENT)
Dept: TRANSPLANT | Facility: CLINIC | Age: 43
End: 2018-09-28

## 2018-09-28 NOTE — TELEPHONE ENCOUNTER
E-mail sent to Beverly today.  Addy Paul,    Hope you are doing better.    I keep getting request/calls from the [facility] pharmacy about your Relizorb.    I have told them to call you or your primary  as we have not seen you in several months ( and Dr Cruz has left )    Can you give them a call at  if you still want/need the Relizorb ?    Very best wishes,  Margarita

## 2019-03-11 ENCOUNTER — TRANSFERRED RECORDS (OUTPATIENT)
Dept: HEALTH INFORMATION MANAGEMENT | Facility: CLINIC | Age: 44
End: 2019-03-11

## 2019-03-12 ENCOUNTER — REFERRAL (OUTPATIENT)
Dept: TRANSPLANT | Facility: CLINIC | Age: 44
End: 2019-03-12

## 2019-03-12 DIAGNOSIS — Z90.410 DIABETES MELLITUS SECONDARY TO PANCREATECTOMY (H): ICD-10-CM

## 2019-03-12 DIAGNOSIS — Z01.818 PRE-TRANSPLANT EVALUATION FOR PANCREAS TRANSPLANT: Primary | ICD-10-CM

## 2019-03-12 DIAGNOSIS — E13.9 DIABETES MELLITUS SECONDARY TO PANCREATECTOMY (H): ICD-10-CM

## 2019-03-12 DIAGNOSIS — Z76.82 ORGAN TRANSPLANT CANDIDATE: ICD-10-CM

## 2019-03-12 DIAGNOSIS — E89.1 DIABETES MELLITUS SECONDARY TO PANCREATECTOMY (H): ICD-10-CM

## 2019-03-12 NOTE — Clinical Note
Appointment Request: New pancreas transplant referral.  Needs pancreas transplant evaluation scheduled.  Please schedule evaluation when Dr. Ricks, Dr. Chowdary, or Dr. Niño is staffing this clinic.Orders Placed: Yes Patient Aware? Yes.Physician Override Approved? N/AAppointment Timeframe Requested: Next available Thank you, Jeanette Hamilton

## 2019-03-12 NOTE — LETTER
April 26,2019  Beverly Fermin  [address] [place] [address]      Dear Beverly,    Thank you for your interest in the Transplant Center at [facility], [facility]. We look forward to being a part of your care team and assisting you through the transplant process.    As we discussed, your transplant coordinator is Jeanette Hamilton (Pancreas).  You may call your coordinator at any time with questions or concerns.  Your first scheduled call will be on 5/6/19.  If this needs to change, call [phone].    Please complete the following.    1. Fill out and return the enclosed forms    Authorization for Electronic Communication    Authorization to Discuss Protected Health Information    Authorization for Release of Protected Health Information    Authorization for Care Everywhere Release of Information    2. Sign up for:    [employer], access to your electronic medical record (see enclosed pamphlet)    [employer]transplantpopAD, a transplant education website    You can use these tools to learn more about your transplant, communicate with your care team, and track your medical details    Sincerely,  Solid Organ Transplant  [facility], [facility]    cc: Marleny Hathaway (PCP), Jabier Heath MD

## 2019-03-12 NOTE — LETTER
Beverly Fermin  [address] [address]          Dear Beverly,    Thank you for your interest in the Transplant Center at [facility], [facility]. We look forward to being a part of your care team and assisting you through the transplant process.    As we discussed, your transplant coordinator is Jeanette Hamilton (Pancreas).  You may call your coordinator at any time with questions or concerns.  Your first scheduled call will be on 5/6/19.  If this needs to change, call [phone].    Please complete the following.    1. Fill out and return the enclosed forms    Authorization for Electronic Communication    Authorization to Discuss Protected Health Information    Authorization for Release of Protected Health Information    Authorization for Care Everywhere Release of Information    2. Sign up for:    [employer], access to your electronic medical record (see enclosed pamphlet)    [employer], a transplant education website    You can use these tools to learn more about your transplant, communicate with your care team, and track your medical details      Sincerely,      Solid Organ Transplant  [facility], [facility]    cc: Referring Physician PCP

## 2019-04-24 VITALS — WEIGHT: 110 LBS | HEIGHT: 62 IN | BODY MASS INDEX: 20.24 KG/M2

## 2019-04-24 ASSESSMENT — MIFFLIN-ST. JEOR: SCORE: 1107.21

## 2019-04-24 NOTE — TELEPHONE ENCOUNTER
PCP: Dr. Marleny Hathaway   Referring Provider: Dr. 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    Insurance information: [employer]   Policy pedersen:  Self  Subscriber/policy/ID number: [id number]  Group Number:  [id number]    Is patient in a group home/assisted living? No  Does patient have a guardian? No    Referral intake process completed.  Patient is aware that after financial approval is received, medical records will be requested.   Patient confirmed for a callback from transplant coordinator on 5/6/2019. (within 2 weeks)  Tentative evaluation date does not want to schedule this today. (within 4 weeks)    Confirmed coordinator will discuss evaluation process in more detail at the time of their call.   Patient is aware of the need to arrange age appropriate cancer screening, vaccinations, and dental care.  Reminded patient to complete questionnaire, complete medical records release, and review packet prior to evaluation visit .  Assessed patient for special needs (ie--wheelchair, assistance, guardian, and ):  No  Patient instructed to call [phone] with questions.     DIYA Suárez, LPN   Solid Organ Transplant

## 2019-04-26 ENCOUNTER — DOCUMENTATION ONLY (OUTPATIENT)
Dept: TRANSPLANT | Facility: CLINIC | Age: 44
End: 2019-04-26

## 2019-07-30 NOTE — TELEPHONE ENCOUNTER
"Contacted patient and introduced myself as their Transplant Coordinator, also introduced the role of the Transplant Coordinator in the transplant process.  Explained the purpose of this call including reviewing next steps and answering questions.  Patient reports she did receive my voice mails has been very busy with her job and \"we are selling our house and in the process of building a new one.  I'm glad you called today as I was thinking of calling you.\"   Confirmed Referring Provider as Dr. Hawley and Primary Care Physician. Notified patient of the importance of continued communication with referring providers and primary care physicians.    Reviewed components of transplant evaluation process including necessary appointments, tests, and procedures.  Patient does report continues on TPN, had the GJ tube removed by Dr. Hawley in April 2019 and the neurostimulator removed by Dr. Lang in June 2019.   She also notes she's not currently being treated for any infections, but has had some fevers, which she reports, \"could be from a virus, so I am going to have this checked out.\"   Answered questions for patient regarding evaluation, provided my name and contact information and requested they call with any additional questions.    Determined that patient would like additional information regarding transplant by:   Mail, Email, MyChart, Phone Call  Noted our Transplant  is awaiting the September evaluation calendar that indicates evaluation days when a pancreas transplant surgeon is staffing the evaluation clinic.  Once the  has received this, she will contact the patient to discuss dates.       "

## 2019-08-20 NOTE — TELEPHONE ENCOUNTER
Called pt and she has been in the hospital, she did confirm for Pancreas eval on Mon 9/23 w/Keys/Finger

## 2019-09-23 ENCOUNTER — OFFICE VISIT (OUTPATIENT)
Dept: TRANSPLANT | Facility: CLINIC | Age: 44
End: 2019-09-23
Attending: PHYSICIAN ASSISTANT
Payer: COMMERCIAL

## 2019-09-23 ENCOUNTER — ANCILLARY PROCEDURE (OUTPATIENT)
Dept: GENERAL RADIOLOGY | Facility: CLINIC | Age: 44
End: 2019-09-23
Attending: PHYSICIAN ASSISTANT
Payer: COMMERCIAL

## 2019-09-23 ENCOUNTER — DOCUMENTATION ONLY (OUTPATIENT)
Dept: TRANSPLANT | Facility: CLINIC | Age: 44
End: 2019-09-23

## 2019-09-23 VITALS
SYSTOLIC BLOOD PRESSURE: 120 MMHG | DIASTOLIC BLOOD PRESSURE: 74 MMHG | OXYGEN SATURATION: 98 % | HEART RATE: 117 BPM | HEIGHT: 61 IN | BODY MASS INDEX: 22.36 KG/M2 | WEIGHT: 118.4 LBS | TEMPERATURE: 98.7 F

## 2019-09-23 DIAGNOSIS — E89.1 DIABETES MELLITUS SECONDARY TO PANCREATECTOMY (H): ICD-10-CM

## 2019-09-23 DIAGNOSIS — N18.2 CKD (CHRONIC KIDNEY DISEASE), STAGE II: ICD-10-CM

## 2019-09-23 DIAGNOSIS — Z92.89 HISTORY OF BLOOD TRANSFUSION: ICD-10-CM

## 2019-09-23 DIAGNOSIS — Z90.410 DIABETES MELLITUS SECONDARY TO PANCREATECTOMY (H): ICD-10-CM

## 2019-09-23 DIAGNOSIS — E10.22 TYPE 1 DIABETES MELLITUS WITH STAGE 3 CHRONIC KIDNEY DISEASE (H): ICD-10-CM

## 2019-09-23 DIAGNOSIS — E13.9 DIABETES MELLITUS SECONDARY TO PANCREATECTOMY (H): ICD-10-CM

## 2019-09-23 DIAGNOSIS — N18.30 TYPE 1 DIABETES MELLITUS WITH STAGE 3 CHRONIC KIDNEY DISEASE (H): ICD-10-CM

## 2019-09-23 DIAGNOSIS — Z76.82 ORGAN TRANSPLANT CANDIDATE: Primary | ICD-10-CM

## 2019-09-23 DIAGNOSIS — K86.89 PANCREATIC INSUFFICIENCY: ICD-10-CM

## 2019-09-23 DIAGNOSIS — Z01.818 PRE-TRANSPLANT EVALUATION FOR PANCREAS TRANSPLANT: ICD-10-CM

## 2019-09-23 DIAGNOSIS — E10.22 TYPE 1 DIABETES MELLITUS WITH STAGE 3 CHRONIC KIDNEY DISEASE (H): Primary | ICD-10-CM

## 2019-09-23 DIAGNOSIS — Z76.82 ORGAN TRANSPLANT CANDIDATE: ICD-10-CM

## 2019-09-23 DIAGNOSIS — K86.81 EXOCRINE PANCREATIC INSUFFICIENCY: Primary | ICD-10-CM

## 2019-09-23 DIAGNOSIS — Z86.711 HISTORY OF PULMONARY EMBOLISM: ICD-10-CM

## 2019-09-23 DIAGNOSIS — N18.30 TYPE 1 DIABETES MELLITUS WITH STAGE 3 CHRONIC KIDNEY DISEASE (H): Primary | ICD-10-CM

## 2019-09-23 DIAGNOSIS — K52.9 CHRONIC DIARRHEA: ICD-10-CM

## 2019-09-23 LAB
ABO + RH BLD: NORMAL
ALBUMIN SERPL-MCNC: 3.5 G/DL (ref 3.4–5)
ALBUMIN UR-MCNC: NEGATIVE MG/DL
ALP SERPL-CCNC: 154 U/L (ref 40–150)
ALT SERPL W P-5'-P-CCNC: 35 U/L (ref 0–50)
ANION GAP SERPL CALCULATED.3IONS-SCNC: 8 MMOL/L (ref 3–14)
APPEARANCE UR: CLEAR
APTT PPP: 23 SEC (ref 22–37)
AST SERPL W P-5'-P-CCNC: 38 U/L (ref 0–45)
BACTERIA #/AREA URNS HPF: ABNORMAL /HPF
BASOPHILS # BLD AUTO: 0 10E9/L (ref 0–0.2)
BASOPHILS NFR BLD AUTO: 0.3 %
BILIRUB SERPL-MCNC: 0.2 MG/DL (ref 0.2–1.3)
BILIRUB UR QL STRIP: NEGATIVE
BLD GP AB SCN SERPL QL: NORMAL
BLOOD BANK CMNT PATIENT-IMP: NORMAL
BUN SERPL-MCNC: 12 MG/DL (ref 7–30)
CALCIUM SERPL-MCNC: 8.5 MG/DL (ref 8.5–10.1)
CHLORIDE SERPL-SCNC: 98 MMOL/L (ref 94–109)
CO2 SERPL-SCNC: 27 MMOL/L (ref 20–32)
COLOR UR AUTO: YELLOW
CREAT SERPL-MCNC: 0.56 MG/DL (ref 0.52–1.04)
DIFFERENTIAL METHOD BLD: ABNORMAL
EOSINOPHIL # BLD AUTO: 0.1 10E9/L (ref 0–0.7)
EOSINOPHIL NFR BLD AUTO: 1 %
ERYTHROCYTE [DISTWIDTH] IN BLOOD BY AUTOMATED COUNT: 13.6 % (ref 10–15)
GFR SERPL CREATININE-BSD FRML MDRD: >90 ML/MIN/{1.73_M2}
GLUCOSE SERPL-MCNC: 180 MG/DL (ref 70–99)
GLUCOSE UR STRIP-MCNC: 50 MG/DL
HBA1C MFR BLD: 11 % (ref 0–5.6)
HCT VFR BLD AUTO: 27.5 % (ref 35–47)
HGB BLD-MCNC: 8.6 G/DL (ref 11.7–15.7)
HGB UR QL STRIP: NEGATIVE
IMM GRANULOCYTES # BLD: 0 10E9/L (ref 0–0.4)
IMM GRANULOCYTES NFR BLD: 0.5 %
INR PPP: 1.15 (ref 0.86–1.14)
KETONES UR STRIP-MCNC: NEGATIVE MG/DL
LEUKOCYTE ESTERASE UR QL STRIP: NEGATIVE
LYMPHOCYTES # BLD AUTO: 1.2 10E9/L (ref 0.8–5.3)
LYMPHOCYTES NFR BLD AUTO: 21.2 %
MCH RBC QN AUTO: 28.5 PG (ref 26.5–33)
MCHC RBC AUTO-ENTMCNC: 31.3 G/DL (ref 31.5–36.5)
MCV RBC AUTO: 91 FL (ref 78–100)
MONOCYTES # BLD AUTO: 0.2 10E9/L (ref 0–1.3)
MONOCYTES NFR BLD AUTO: 3.8 %
NEUTROPHILS # BLD AUTO: 4.3 10E9/L (ref 1.6–8.3)
NEUTROPHILS NFR BLD AUTO: 73.2 %
NITRATE UR QL: NEGATIVE
NRBC # BLD AUTO: 0 10*3/UL
NRBC BLD AUTO-RTO: 0 /100
PH UR STRIP: 7 PH (ref 5–7)
PHOSPHATE SERPL-MCNC: 3.5 MG/DL (ref 2.5–4.5)
PLATELET # BLD AUTO: 165 10E9/L (ref 150–450)
POTASSIUM SERPL-SCNC: 3.7 MMOL/L (ref 3.4–5.3)
PROT SERPL-MCNC: 7.6 G/DL (ref 6.8–8.8)
RBC # BLD AUTO: 3.02 10E12/L (ref 3.8–5.2)
RBC #/AREA URNS AUTO: 1 /HPF (ref 0–2)
SODIUM SERPL-SCNC: 133 MMOL/L (ref 133–144)
SOURCE: ABNORMAL
SP GR UR STRIP: 1.01 (ref 1–1.03)
SPECIMEN EXP DATE BLD: NORMAL
SPECIMEN EXP DATE BLD: NORMAL
SQUAMOUS #/AREA URNS AUTO: 2 /HPF (ref 0–1)
UROBILINOGEN UR STRIP-MCNC: 0 MG/DL (ref 0–2)
WBC # BLD AUTO: 5.9 10E9/L (ref 4–11)
WBC #/AREA URNS AUTO: 2 /HPF (ref 0–5)

## 2019-09-23 PROCEDURE — 86147 CARDIOLIPIN ANTIBODY EA IG: CPT | Performed by: NURSE PRACTITIONER

## 2019-09-23 PROCEDURE — 84681 ASSAY OF C-PEPTIDE: CPT | Performed by: NURSE PRACTITIONER

## 2019-09-23 PROCEDURE — 86850 RBC ANTIBODY SCREEN: CPT | Performed by: NURSE PRACTITIONER

## 2019-09-23 PROCEDURE — 81001 URINALYSIS AUTO W/SCOPE: CPT | Performed by: NURSE PRACTITIONER

## 2019-09-23 PROCEDURE — 87340 HEPATITIS B SURFACE AG IA: CPT | Performed by: NURSE PRACTITIONER

## 2019-09-23 PROCEDURE — 86780 TREPONEMA PALLIDUM: CPT | Performed by: NURSE PRACTITIONER

## 2019-09-23 PROCEDURE — 86665 EPSTEIN-BARR CAPSID VCA: CPT | Performed by: NURSE PRACTITIONER

## 2019-09-23 PROCEDURE — 86787 VARICELLA-ZOSTER ANTIBODY: CPT | Performed by: NURSE PRACTITIONER

## 2019-09-23 PROCEDURE — 86706 HEP B SURFACE ANTIBODY: CPT | Performed by: NURSE PRACTITIONER

## 2019-09-23 PROCEDURE — 86905 BLOOD TYPING RBC ANTIGENS: CPT | Performed by: NURSE PRACTITIONER

## 2019-09-23 PROCEDURE — 84100 ASSAY OF PHOSPHORUS: CPT | Performed by: NURSE PRACTITIONER

## 2019-09-23 PROCEDURE — 85670 THROMBIN TIME PLASMA: CPT | Performed by: NURSE PRACTITIONER

## 2019-09-23 PROCEDURE — 86901 BLOOD TYPING SEROLOGIC RH(D): CPT | Performed by: NURSE PRACTITIONER

## 2019-09-23 PROCEDURE — 85613 RUSSELL VIPER VENOM DILUTED: CPT | Performed by: NURSE PRACTITIONER

## 2019-09-23 PROCEDURE — 86481 TB AG RESPONSE T-CELL SUSP: CPT | Performed by: NURSE PRACTITIONER

## 2019-09-23 PROCEDURE — 36415 COLL VENOUS BLD VENIPUNCTURE: CPT | Performed by: NURSE PRACTITIONER

## 2019-09-23 PROCEDURE — 86803 HEPATITIS C AB TEST: CPT | Performed by: NURSE PRACTITIONER

## 2019-09-23 PROCEDURE — 85610 PROTHROMBIN TIME: CPT | Performed by: NURSE PRACTITIONER

## 2019-09-23 PROCEDURE — 85025 COMPLETE CBC W/AUTO DIFF WBC: CPT | Performed by: NURSE PRACTITIONER

## 2019-09-23 PROCEDURE — 83036 HEMOGLOBIN GLYCOSYLATED A1C: CPT | Performed by: NURSE PRACTITIONER

## 2019-09-23 PROCEDURE — 86886 COOMBS TEST INDIRECT TITER: CPT | Performed by: NURSE PRACTITIONER

## 2019-09-23 PROCEDURE — 86704 HEP B CORE ANTIBODY TOTAL: CPT | Performed by: NURSE PRACTITIONER

## 2019-09-23 PROCEDURE — 86900 BLOOD TYPING SEROLOGIC ABO: CPT | Performed by: NURSE PRACTITIONER

## 2019-09-23 PROCEDURE — 85730 THROMBOPLASTIN TIME PARTIAL: CPT | Performed by: NURSE PRACTITIONER

## 2019-09-23 PROCEDURE — 80053 COMPREHEN METABOLIC PANEL: CPT | Performed by: NURSE PRACTITIONER

## 2019-09-23 PROCEDURE — 40000866 ZZHCL STATISTIC HIV 1/2 ANTIGEN/ANTIBODY PRETRANSPLANT ONLY: Performed by: NURSE PRACTITIONER

## 2019-09-23 PROCEDURE — 86644 CMV ANTIBODY: CPT | Performed by: NURSE PRACTITIONER

## 2019-09-23 RX ORDER — TIZANIDINE 2 MG/1
TABLET ORAL
COMMUNITY
Start: 2019-08-30

## 2019-09-23 RX ORDER — FENTANYL 25 UG/1
1 PATCH TRANSDERMAL
COMMUNITY
Start: 2019-09-12

## 2019-09-23 RX ORDER — NADOLOL 40 MG/1
40 TABLET ORAL
COMMUNITY
Start: 2019-07-18

## 2019-09-23 RX ORDER — DICYCLOMINE HYDROCHLORIDE 10 MG/1
CAPSULE ORAL
COMMUNITY
Start: 2017-12-13

## 2019-09-23 RX ORDER — HEPARIN SODIUM (PORCINE) LOCK FLUSH IV SOLN 100 UNIT/ML 100 UNIT/ML
300 SOLUTION INTRAVENOUS
COMMUNITY
Start: 2019-02-25

## 2019-09-23 RX ORDER — NORETHINDRONE ACETATE AND ETHINYL ESTRADIOL 1.5-30(21)
1 KIT ORAL
COMMUNITY
Start: 2019-07-18

## 2019-09-23 RX ORDER — BUMETANIDE 1 MG/1
1 TABLET ORAL
COMMUNITY
Start: 2019-08-18 | End: 2020-01-01

## 2019-09-23 RX ORDER — GABAPENTIN 300 MG/1
600 CAPSULE ORAL
COMMUNITY
Start: 2019-02-26

## 2019-09-23 RX ORDER — TEMAZEPAM 15 MG/1
15 CAPSULE ORAL
COMMUNITY
Start: 2019-06-13

## 2019-09-23 ASSESSMENT — MIFFLIN-ST. JEOR: SCORE: 1132.93

## 2019-09-23 NOTE — PROGRESS NOTES
"  Assessment and Plan:  # Pancreas Transplant Evaluation: Patient is a fair candidate overall. Benefits of a living donor transplant were discussed.    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    # Type 1 diabetes: poorly controlled using 40-60 units of insulin per day with pump with hemoglobin A1c levels ranging 10-16% for the last year and a half. She has hypoglycemic unawareness in the 30-40s about once per week. She is a planning to get a sensor and follows with [facility].    # Pancreatic insufficiency, chronic diarrhea, malabsorption: maintained on Creon. Having 20-30 loose/oily stools per day. Previously on tube feeding. G J-tube removed due to wound infection (now healed). Albumin levels range 2-3. On TPN. Weight has remain stable with a BMI 22. She will need further work up for diarrhea with local GI. Once her diarrhea and nutritional status is optimized, would consider immunosuppression trial.     # Gastroparesis s/p gastric pacemaker placement. Battery is due to be changed. She complains of daily nausea and poor appetite that is under reasonable control with as needed ondansetron and metoclopramide as she rarely has vomiting. Will need updated gastric emptying study as the last on was over 3 years ago.     # Chronic abdominal  cramping\" pain: managing with 10 mg of oxycodone once every few days and daily gabapentin, cymbalta and fentanyl patch. This is followed by local pain clinic.    # Cardiac Risk: no history of cardiac disease or events.  Her last ECHO in 8/2019 showed an EF of 60% and mild pulmonary hypertension with a PA pressure of 46 mmHg. She has not had a recent stress test. Would need cardiology clearance and further testing for CAD.     # H/o portal vein thrombosis: after autoislet transplant, complicated by portal hypertension with associated grade II " esophageal varices. Would consider checking portal vein pressures, although this appears to be recannulated as most recent abd US in 9/2019 showed patent portal vein with flow in the correct direction with appropriate velocities and waveforms. No definite main portal vein thrombus. Liver appeared slightly heterogeneous.      # H/o PEs: with a small subsegmental PE in 3/2018 after a week-long hospitalization for G J-tube placement. Source of clot thought to be from tunneled central catheter. She was treated Coumadin and also Lovenox due to poor anticoagulant absorption. Her course was complicated by acute upper GI bleeding in the setting of supra therapeutic INR and anticoagulation was discontinued. She had recent bilateral septic emboli in the setting of central line associated bacteremia. Given history of PEs and GI bleeding, recommend bleeding and clotting clinic evaluation.     # H/o GI bleeding: in 4/2018 in the setting of supra therapeutic INR with an EGD in 4/2018 that showed grade 1 esophageal varices and non-bleeding erosive gastropathy. She had another recent episode in 8/2019 with an EGD showed small non-bleeding grade II esophageal varices and multiple nonbleeding gastric and duodenal ulcers. Upper GI source thought unlikely, thought possibly from distal small bowel vs anastomoses for her history of pancreatectomy vs colon (although colonoscopy showed no source of bleeding).     # Recurrent line-associated blood infections: 4/2018 (klebsiella pneumoniae), 9/2018 (MRSA), 2/2019 (candida glabrata), 8/2019 (giulia glabrata). Local ID following. She completed vancomycin and micafungin treatment sometime in the past several weeks.     # CKD II: with baseline creatinine of 1 mg/dL. No significant proteinuria in 2018.     # Deconditioned: patient reports she may be able to walk a quarter of a mile, but struggles with a flight of stairs due to shortness of breath and lower extremity fatigue. She is planning for  an elevator to be placed in newly built home.     # Incomplete bladder emptying: needs urology consult.    # Health Maintenance: 7/2019 mammogram, 7/2019 pap smear (NIL, HPV negative) and dental UTD.    Discussed the risks and benefits of a transplant, including the risk of surgery and immunosuppression medications.  Patient's overall evaluation will be discussed in the Transplant Program's regular meeting with a final recommendation on the patients suitability for transplant to be made at that time.  Patient was seen in conjunction with Dr. Jarvis Johnson as part of a shared visit.    Evaluation:  Beverly Elena was seen in consultation at the request of Dr. Devyn Chowdary for evaluation as a potential pancreas transplant recipient.    Reason for Visit:  Beverly Elena is a 43 year old female with Type 1 Diabetes, who presents for pancreas transplant evaluation.    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  She had a complicated course postoperatively including portal vein thrombosis with portal hypertension, poorly controlled diabetes with unawareness, gastroparesis s/p gastric pacemaker placement, chronic abdominal pain, chronic diarrhea, malabsorption, weight loss, failure to thrive, PEs, GI bleeding and recurrent central line-associated blood infections. She had a GJ tube placed in 2017 that was recently removed due to a chronic necrotic wound (since healed), however, she has continued with long standing TPN use.       Pancreatic insufficiency, chronic diarrhea, malabsorption    She reports she tried many different pancreatic enzyme replacement therapies and is currently using Creon with meals. She has 20-30 loose/oily stools per  "day, this was thought to be secondary to fat malabsorption when seen last by GI last in 2018. Further work up for diarrhea was recommended at that time, but she still needs follow up. She has hypoalbuminemia with levels ranging 2s-3s. Weight has remain stable with a BMI 22.     Gastroparesis   S/p gastric pacemaker placed 3 years ago.  She reports she likely needs the battery changed as she feels like it is not working properly.  She complains of daily nausea and poor appetite that is under reasonable control with as needed ondansetron and metoclopramide as she rarely has vomiting. Her last gastric emptying study was over 3 years ago.      Chronic abdominal  \"cramping\" pain  Managing with 10 mg of oxycodone once every few days and daily gabapentin, cymbalta and fentanyl patch. This is followed by local pain clinic. Of note, she also has been diagnosed with endometriosis s/p single oophorectomy and bilateral tubal removal. Other abdominal surgeries include cholecystectomy and ventral hernia repair with 14 x 24 cm soft mesh. Surgery records at that time noted extensive intra-abdominal adhesions.    Type 1 diabetes:  Currently using 40-60 units of insulin per day, with an insulin pump, Her diabetes has been difficult to control with hemoglobin A1c levels ranging 10-16% for the last year and a half. She also has hypoglycemic unawareness in the 30-40s about once per week. She is a planning to get a sensor and follows with [facility].    Portal Hypertension   Secondary to sarah-vein thromboses after autoislet transplant with chronic varices (small non bleeding grade II on 8/2019 EGD).  Portal hypertension appears recannulated on 9/2019 abd US that showed patent portal vein and flow is in the correct direction with appropriate velocities and waveforms. No definite main portal vein thrombus. Liver appeared slightly heterogeneous.     History of PEs/GI bleeding   She had a small subsegmental PE was on 3/22/2018 " after a week-long hospitalization for G J-tube placement. Source of clot thought to be from tunneled central catheter. She was started on Coumadin and also Lovenox due to poor anticoagulant absorption. Her course was complicated by acute upper GI bleeding in the setting of supra therapeutic INR with an EGD in 4/2018 that shoed grade 1 esophageal varices and non-bleeding erosive gastropathy. In 8/2019 she had bilateral septic emboli in the setting of central line associated bacteremia and GI bleeding. An EGD showed small non-bleeding grade II esophageal varices and multiple nonbleeding gastric and duodenal ulcers. Upper GI source thought unlikely, instead thought to be from distal small bowel vs her anastomoses for her history of pancreatectomy vs colon (although colonoscopy showed no source of bleeding).     Recurrent line-associated blood infections   4/2018 (klebsiella pneumoniae), 9/2018 (MRSA), 2/2019 (candida glabrata), 8/2019 (giulia glabrata). ID following. She completed vancomycin and micafungin treatment sometime in the past several weeks.     Cardiac and Functional Capacity  She has no history of cardiac disease or events.  Her last ECHO in 8/2019 showed an EF of 60% and mild pulmonary hypertension with a PA pressure of 46 mmHg. She has not had a recent stress test.  She is fairly deconditioned and struggles doing a flight of stairs due to shortness of breath and lower extremity fatigue. With exertion she denies chest pain.  She reports she might be able to walk a quarter of a mile.  She and her  are planning to build a new home with an elevator for her.       CKD  Likley from diabetes and recurrent CAMERON, with baseline creatinine of 1.0-1.3 and associated GFRs ranging 30s-60s. She had 100 mg/dl of abuminuria in 2/2019.  She continues to make good urine output and denies dysuria and hematuria but does complain of some trouble emptying her bladder and has to lean over to complete empty on a daily  basis.     Psychosocial   She lives with her  in [place] and does not have any children.  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           Kidney Disease Hx:        Kidney Disease Dx: likely DM I and recurrent CAMERON        Biopsy Proven: No         On Dialysis: No       Primary Nephrologist: N/A       H/o Kidney Stones: No       H/o Recurrent/Frequent UTI: No         Diabetic Hx:        Diagnosis Date: 2013       Medication History: 40-60 units of insulin with pump       Diabetic Control: Poorly controlled (HbA1c >9%)   Last HbA1c: 10-16%       Hypoglycemic Unawareness: Yes; once per week       End-Organ Damage due to DM: Nephropathy         Cardiac/Vascular Disease Risk Factors:        Cardiac Risk Factors: Diabetes, Hypertension and CKD       Known CAD: No       Known PAD/Caludication Symptoms: No       Known Heart Failure: No       Arrhythmia: No       Pulmonary Hypertension: Yes; last  PA pressure of 46 mmHg.       Valvular Disease: No       Other: None            Fatigue/Decreased Energy: [] No [x] Yes    Chest Pain or SOB with Exertion: [] No [x] Yes    Significant Weight Change: [x] No [] Yes Varies between 100-120 lbs    Nausea, Vomiting or Diarrhea: [] No [x] Yes    Fever, Sweats or Chills:  [x] No [] Yes    Leg Swelling [] No [x] Yes         History of Cancer: None      Review of Systems:  A comprehensive review of systems was obtained and negative, except as noted in the HPI or PMH.    Past Medical History:   Medical record was reviewed and PMH was discussed with patient and noted below.  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) 6/28/2013      DM     Type 1 DM, has insulin pump      Endometriosis      Endometriosis 9/9/2011     Exocrine pancreatic insufficiency 6/28/2013     Gastro-oesophageal reflux disease      Gastroparesis      Gastroparesis 7/23/2012     History of blood transfusion     2013     Iron deficiency anemia 7/12/2013     Other chronic pain      Pancreatic disease     history of pancreatitis     Pancreatic divisum      Pancreatic divisum 10/1/2012     Pneumonia, organism unspecified(486) 7/17/2013     PONV (postoperative nausea and vomiting)     Nausea     Portal vein thrombosis 6/29/2013     Recurrent acute pancreatitis 1/17/2013     Sphincter of Oddi dysfunction 1/17/2013     Syncope     X 2       Past Social History:   Past Surgical History:   Procedure Laterality Date     APPENDECTOMY       BACK SURGERY      Neurosimulator removed 4/24/2019     CHOLECYSTECTOMY       COLONOSCOPY  5/21/2014    Procedure: COMBINED COLONOSCOPY, SINGLE BIOPSY/POLYPECTOMY BY BIOPSY;  Surgeon: Hugo Lind MD;  Location:  GI     ENDOSCOPIC INSERTION TUBE GASTROSTOMY  2/5/2014    Procedure: ENDOSCOPIC INSERTION TUBE GASTROSTOMY;  Endoscopic percutaneous gastro-jejunostomy tube placement (removal of previous gastrostomy tube)  ;  Surgeon: Sharon Oh MD;  Location: [facility]     ENDOSCOPIC RETROGRADE CHOLANGIOPANCREATOGRAM  9/9/2011    Procedure:ENDOSCOPIC RETROGRADE CHOLANGIOPANCREATOGRAM; Endoscopic Retrograde Cholangiopancreatogram with C Arm, Biliary Sphincterotomy, insertion Bile Duct Stent X 1; Surgeon:SU GONZALES; Location: OR     ENDOSCOPIC RETROGRADE CHOLANGIOPANCREATOGRAM  12/15/2011    Procedure:ENDOSCOPIC RETROGRADE CHOLANGIOPANCREATOGRAM; Endoscopic Retrograde Cholangiopancreatogram (c-arm), with pancreatic stent placement, sphincterotomy; Surgeon:SU GONZALES; Location: OR     ENDOSCOPIC RETROGRADE CHOLANGIOPANCREATOGRAM  6/14/2012    Procedure: ENDOSCOPIC RETROGRADE CHOLANGIOPANCREATOGRAM;  endoscopic  retrograde cholangiopancreatogram with pancreatic stent placement  (c-arm);  Surgeon: Jabier Heath MD;  [facility]     ESOPHAGOSCOPY, GASTROSCOPY, DUODENOSCOPY (EGD), COMBINED  11/9/2011    Procedure:COMBINED ESOPHAGOSCOPY, GASTROSCOPY, DUODENOSCOPY (EGD); Surgeon:TREE DEAN; Location:[facility]     ESOPHAGOSCOPY, GASTROSCOPY, DUODENOSCOPY (EGD), COMBINED  5/21/2014    Procedure: COMBINED ESOPHAGOSCOPY, GASTROSCOPY, DUODENOSCOPY (EGD), BIOPSY SINGLE OR MULTIPLE;  Surgeon: Hugo Lind MD;  [facility]     HC CHANGE GASTROSTOMY TUBE PERC, WO IMAGING OR ENDO GUIDE N/A 11/25/2014    Procedure: CHANGE GASTROSTOMY TUBE WITHOUT SCOPE;  Surgeon: Sharon Oh MD;  [facility]     HC IMPLANT PERIPH/GASTRIC NEUROSTIM/  1/11/2012    gastric pacemaker     HC REPLACE DUODENOSTOMY/JEJUNOSTOMY TUBE PERCUTANEOUS  2/12/2014    Procedure: REPLACE JEJUNOSTOMY TUBE, PERCUTANEOUS;  Surgeon: Sharon Oh MD;  [facility]     HC UGI ENDOSCOPY W EUS  9/7/2011    Procedure:COMBINED ENDOSCOPIC ULTRASOUND, ESOPHAGOSCOPY, GASTROSCOPY, DUODENOSCOPY (EGD); Surgeon:TREE DEAN; Location:[facility]     HC UGI ENDOSCOPY W EUS  12/4/2012    Procedure: COMBINED ENDOSCOPIC ULTRASOUND, ESOPHAGOSCOPY, GASTROSCOPY, DUODENOSCOPY (EGD);  Surgeon: Sharon Oh MD;  [facility]     HC UGI ENDOSCOPY W EUS  5/8/2013    Procedure: COMBINED ENDOSCOPIC ULTRASOUND, ESOPHAGOSCOPY, GASTROSCOPY, DUODENOSCOPY (EGD);  Surgeon: Tree Dean MD;  [facility]     HC UGI ENDOSCOPY W PLACEMENT GASTROSTOMY TUBE PERCUT N/A 2/19/2016    Procedure: COMBINED ESOPHAGOSCOPY, GASTROSCOPY, DUODENOSCOPY (EGD), PLACE PERCUTANEOUS ENDOSCOPIC GASTROSTOMY TUBE;  Surgeon: Sharon Oh MD;  [facility]     HERNIA REPAIR      Abd hernia repair with mesh      LAPAROSCOPIC IMPLANT GASTRIC STIMULATOR  1/11/2012    Procedure:LAPAROSCOPIC IMPLANT GASTRIC STIMULATOR; LAPAROSCOPIC IMPLANT GASTRIC STIMULATOR ;  Surgeon:CHONG HART; Location:[facility]     LAPAROSCOPIC SALPINGO-OOPHORECTOMY       LAPAROSCOPIC SALPINGOTOMY      R     LAPAROTOMY EXPLORATORY  2013    Procedure: LAPAROTOMY EXPLORATORY;  Exploratory laparotomy, portal vein declotting with tissue plasminogen activator administration, thrombectomy and embolectomy of portal vein, intraoperative trans-sonic flow monitoring, intraoperative ultrasound;  Surgeon: Rajendra Cruz MD;  [facility]     neurostimulator[  2011     PANCREATECTOMY, TRANSPLANT AUTO ISLET CELL, COMBINED  2013    Procedure: COMBINED PANCREATECTOMY, TRANSPLANT AUTO ISLET CELL;  Total Pancreatectomy, Auto Islet Cell Transplant             Anesthesia General with block;  Surgeon: Rajendra Cruz MD;  [facility]     PICC INSERTION Right 2017    5fr TL Bard PICC, 37cm (3cm external) in the R basilic vein w/ tip in the low SVC     REMOVE AND REPLACE BREAST IMPLANT PROSTHESIS N/A 3/12/2018    Procedure: PERCUTANEOUS INSERTION TUBE JEJUNOSTOMY;  Percutaneous endoscopic gastrostomy to jejunostomy tube;  Surgeon: Guru Brent Ortiz MD;  [facility]     TRANSPLANT      Auto Islet Transplant in  [facility]     TUBAL LIGATION       Personal history of bleeding or anesthesia problems: No    Family History:  Family History   Problem Relation Age of Onset     C.A.D. Father         GI: V, D, anorexia     Blood Disease Father         lymphoma -  from pulmonary fibrosis.     Malignant Hyperthermia Brother      Diabetes Maternal Grandmother         type 2 with older age     Diabetes Maternal Grandfather         type 2 with older age     Cancer - colorectal Maternal Grandfather      Diabetes Paternal Grandmother         type 2 with older age     Connective Tissue Disorder Sister         scleroderma       Personal History:   Social History     Socioeconomic History     Marital status:      Spouse name: Chau     Number of children: 0     Years of education: 24      Highest education level: Not on file   Occupational History     Occupation: ER physician   Social Needs     Financial resource strain: Not on file     Food insecurity:     Worry: Not on file     Inability: Not on file     Transportation needs:     Medical: Not on file     Non-medical: Not on file   Tobacco Use     Smoking status: Never Smoker     Smokeless tobacco: Never Used   Substance and Sexual Activity     Alcohol use: No     Drug use: No     Sexual activity: Yes     Birth control/protection: Surgical     Comment: Lupron   Lifestyle     Physical activity:     Days per week: Not on file     Minutes per session: Not on file     Stress: Not on file   Relationships     Social connections:     Talks on phone: Not on file     Gets together: Not on file     Attends [employer] service: Not on file     Active member of club or organization: Not on file     Attends meetings of clubs or organizations: Not on file     Relationship status: Not on file     Intimate partner violence:     Fear of current or ex partner: Not on file     Emotionally abused: Not on file     Physically abused: Not on file     Forced sexual activity: Not on file   Other Topics Concern     Parent/sibling w/ CABG, MI or angioplasty before 65F 55M? Not Asked      Service Not Asked     Blood Transfusions No     Caffeine Concern Not Asked     Occupational Exposure Not Asked     Hobby Hazards Not Asked     Sleep Concern Not Asked     Stress Concern Not Asked     Weight Concern Not Asked     Special Diet Not Asked     Back Care Not Asked     Exercise Not Asked     Bike Helmet Not Asked     Seat Belt Yes     Self-Exams Not Asked   Social History Narrative     Not on file       Allergies:  Allergies   Allergen Reactions     Penicillin G Anaphylaxis     Penicillins Anaphylaxis     06/2013 - pt tolerated ertapenem     Corticosteroids Other (See Comments)     All oral,IV and injectable steroids are contraindicated (unless in life threatening  "situations) in Islet Auto transplant recipients. They can cause irreversible loss of islet cell function. Please contact patients transplant care coordinator LAUREN Gerard RN at /pager   and Endocrinologist prior to administration.     Ertapenem Other (See Comments)     Elevated LFT's  Elevated LFT's     Merrem [Meropenem] Other (See Comments)     Elevated LFTs >3x, happened with invanz too  Elevated LFT's     Vancomycin Other (See Comments)     osiel syndrome     Vancomycin Other (See Comments)     Gets \"red lizandro syndrome\"       Medications:  Current Outpatient Medications   Medication Sig     ACCU-CHEK FASTCLIX LANCETS MISC 1 each every 2 hours as needed.     acetaminophen (TYLENOL) 32 mg/mL solution Take 20.3 mLs (650 mg) by mouth every 6 hours as needed for mild pain or fever     Alcohol Swabs 70 % PADS Use to check blood glucose 6-8 times daily     amitriptyline (ELAVIL) 100 MG tablet Take 1 tablet (100 mg) by mouth At Bedtime     amylase-lipase-protease (CREON 24) 47899-76493 UNITS CPEP per EC capsule Take 1 capsule (24,000 Units) by mouth every 4 hours as needed If ReliZORB unavailable: Open capsule and empty contents into 15 ml sodium bicarb solution (see sodium bicarb order), let dissolve for about 20-30 minutes and then add this solution to the amount of TF formula hung in TF bag every 4 hrs (i.e., once TF @ goal infusion 40 ml/hr will mix 2 capsules into 160 ml of TF formula every 4 hrs).     BD SHARPS CONTAINER HOME MISC Use to dispose of used sharps     bumetanide (BUMEX) 1 MG tablet Take 1 mg by mouth     Cholecalciferol (VITAMIN D3 PO) Take 8,000 Units by mouth daily      dicyclomine (BENTYL) 10 MG capsule Take one QID prn spasms.     ergocalciferol (DRISDOL) 8000 UNIT/ML drops Take 0.75 mLs (6,000 Units) by mouth daily     esomeprazole (NEXIUM) 40 MG capsule Take 1 capsule (40 mg) by mouth 2 times daily Take 30-60 minutes before eating.     fentaNYL (DURAGESIC) 25 mcg/hr 72 " hr patch Place 1 patch onto the skin     gabapentin (NEURONTIN) 300 MG capsule Take 600 mg by mouth     glucose 40 % GEL Take 15 g by mouth every hour as needed.     glucose blood VI test strips strip Use 6-8 times daily to check blood glucose levels.  (Accu-chek Smartview test strips)     heparin 100 UNIT/ML SOLN injection Inject 300 Units into the vein     Insulin Aspart (INSULIN PUMP - OUTPATIENT) 1 unit/hour     insulin bolus from AMBULATORY PUMP Insulin dose = 1 units for 20 grams of carbohydrate     insulin regular (HUMULIN R/NOVOLIN R VIAL) 100 UNIT/ML vial Inject 20 Units Subcutaneous     lifitegrast (XIIDRA) 5 % opthalmic solution 1 drop     metoclopramide (REGLAN) 10 MG tablet Take 1 tablet by mouth every 6 hours as needed     multivitamin CF formula (AQUADEKS) LIQD liquid 4 mLs by Per Feeding Tube route daily     nadolol (CORGARD) 40 MG tablet Take 40 mg by mouth     norethindrone-ethinyl estradiol-iron (MICROGESTIN FE1.5/30) 1.5-30 MG-MCG tablet Take 1 tablet by mouth     ondansetron (ZOFRAN-ODT) 8 MG disintegrating tablet Every 4-6 hours as needed     oxyCODONE (ROXICODONE) 5 MG/5ML solution Take 5-10 mLs (5-10 mg) by mouth every 4 hours as needed for moderate to severe pain     Probiotic Product (PROBIOTIC DAILY PO) Take 2 tablets by mouth daily     prochlorperazine (COMPAZINE) 10 MG tablet Take 1 tablet (10 mg) by mouth every 6 hours as needed for vomiting     rizatriptan (MAXALT) 10 MG tablet Take 10 mg by mouth at onset of headache      sodium bicarbonate 325 MG tablet 1 tablet (325 mg) by Per J Tube route every 4 hours as needed (if ReliZORB unavailable) If ReliZORB unavailable: Crush 1 tablet and mix into 15 ml of warm water and use this solution to mix with Creon pancreatic enzymes. DO NOT administer directly into Jtube (to be mixed into TF formula with Creon enzyme - see Creon enzyme order)     temazepam (RESTORIL) 15 MG capsule Take 15 mg by mouth     tiZANidine (ZANAFLEX) 2 MG tablet Take 2  "tablets by mouth every night at bedtime     amylase-lipase-protease (CREON) 80390-06331 UNITS CPEP per EC capsule Open capsule and empty contents into 15 ml sodium bicarb solution (see sodium bicarb order), let dissolve for about 20-30 minutes and then add this solution to the amount of TF formula hung in TF bag every 4 hrs (i.e., once TF @ goal infusion 40 ml/hr will mix 2 capsules into 160 ml of TF formula every 4 hrs).     DULOXETINE HCL PO Take 30 mg by mouth 2 times daily     glucagon (GLUCAGON EMERGENCY) 1 MG kit Inject 1 mg into the muscle once for 1 dose     insulin bolus from AMBULATORY PUMP Inject Subcutaneous 3 times daily (before meals) Insulin dose = 1 units for 20 grams of carbohydrate     insulin bolus from AMBULATORY PUMP Inject Subcutaneous 4 times daily (before meals and nightly) Bolus-Correction 1 unit(s) to lower blood glucose by 75 mg/dL     mirtazapine (REMERON) 15 MG tablet Take 0.5 tablets (7.5 mg) by mouth At Bedtime (Patient not taking: Reported on 9/23/2019)     polyethylene glycol (MIRALAX/GLYCOLAX) Packet Take 8.5 g by mouth 2 times daily (Patient not taking: Reported on 9/23/2019)     No current facility-administered medications for this visit.        Vitals:  /74   Pulse 117   Temp 98.7  F (37.1  C)   Ht 1.555 m (5' 1.22\")   Wt 53.7 kg (118 lb 6.4 oz)   SpO2 98%   Breastfeeding? No   BMI 22.21 kg/m      Exam:  GENERAL APPEARANCE: alert and no distress  HENT: mouth without ulcers or lesions  LYMPHATICS: no cervical or supraclavicular nodes  RESP: lungs clear to auscultation - no rales, rhonchi or wheezes  CV: regular rhythm, normal rate, no rub, no murmur  FEMORAL PULSES: 2+ equal bilaterally.   EDEMA: no LE edema bilaterally  ABDOMEN: soft, nondistended, nontender, bowel sounds normal  MS: extremities normal - no gross deformities noted, no evidence of inflammation in joints, no muscle tenderness  SKIN: no rash    Results:   Recent Results (from the past 336 hour(s))   EKG " 12-lead, tracing only [EKG1]    Collection Time: 09/23/19 11:17 AM   Result Value Ref Range    Interpretation ECG Click View Image link to view waveform and result    HLA Typing Complete SOT Recipient    Collection Time: 09/23/19 12:53 PM   Result Value Ref Range    ABTest Method SSOP     A* locus A*03     A* A*11     B* locus B*07     B* B*35     C* locus C*04     C* C*07     Bw-1 Bw*6     Drsso Test Method SSOP     DRB1* locus DRB1*01     DRB1* DRB1*11     DRB3* locus DRB3*02     DQB1* locus DQB1*03(07)     DQB1* DQB1*05     DQA1*locus DQA1*01     DQA1* DQA1*05     DPB1* DPB1*04:01     DPB1* NMDP BSBUJ     DPB1*locus DPB1*10:01     DPB1* locus NMDP BMJWZ     DPA1* DPA1*01:03     DPA1* NMDP BPGMP     DPA1*locus DPA1*02:01     DPA1* locus NMDP CBVG    PRA Single Antigen IgG Antibody    Collection Time: 09/23/19 12:53 PM   Result Value Ref Range    SA1 Test Method SA FCS     SA1 Cell Class I     SA1 Hi Risk Stephania A:2 23 24 68 69 B:57 58     SA1 Mod Risk Stephania A:26 32 43 66     SA1 Comments        Test performed by modified procedure. Serum heat inactivated and tested   by a modified ([place]) protocol including fetal calf serum addition.   High-risk, mfi >3,000. Mod-risk, mfi 500-3,000.      SA2 Test Method SA FCS     SA2 Cell Class II     SA2 Hi Risk Stephania None     SA2 Mod Risk Stephania None     SA2 Comments        Test performed by modified procedure. Serum heat inactivated and tested   by a modified ([place]) protocol including fetal calf serum addition.   High-risk, mfi >3,000. Mod-risk, mfi 500-3,000.      Protocol Cutoff Plan A, 500 mfi cumulative      UNOS cPRA 80     Unacceptable Antigen A:2 23 24 26 32 43 66 68 69 B:57 58    CMV Antibody IgG [[id number]]    Collection Time: 09/23/19 12:53 PM   Result Value Ref Range    CMV Antibody IgG <0.2 0.0 - 0.8 AI   EBV Capsid Antibody IgG [[id number]]    Collection Time: 09/23/19 12:53 PM   Result Value Ref Range    EBV Capsid Antibody IgG 0.6 0.0 - 0.8 AI   Hepatitis B core antibody  [[id number]]    Collection Time: 09/23/19 12:53 PM   Result Value Ref Range    Hepatitis B Core Stephania Nonreactive NR^Nonreactive   Hepatitis B Surface Antibody [[id number]]    Collection Time: 09/23/19 12:53 PM   Result Value Ref Range    Hepatitis B Surface Antibody 29.78 (H) <8.00 m[IU]/mL   Hepatitis B surface antigen [KRH944]    Collection Time: 09/23/19 12:53 PM   Result Value Ref Range    Hep B Surface Agn Nonreactive NR^Nonreactive   Hepatitis C antibody [BPJ227]    Collection Time: 09/23/19 12:53 PM   Result Value Ref Range    Hepatitis C Antibody Nonreactive NR^Nonreactive   HIV Antigen Antibody Combo Pretransplant    Collection Time: 09/23/19 12:53 PM   Result Value Ref Range    HIV Antigen Antibody Combo Pretransplant Nonreactive NR^Nonreactive   Varicella Zoster Virus Antibody IgG [XQU4791]    Collection Time: 09/23/19 12:53 PM   Result Value Ref Range    Varicella Zoster Virus Antibody IgG 1.2 (H) 0.0 - 0.8 AI   Treponema Abs w Reflex to RPR and Titer    Collection Time: 09/23/19 12:53 PM   Result Value Ref Range    Treponema Antibodies Nonreactive NR^Nonreactive   C-peptide    Collection Time: 09/23/19 12:53 PM   Result Value Ref Range    C Peptide 0.2 (L) 0.9 - 6.9 ng/mL   Hemoglobin A1c    Collection Time: 09/23/19 12:53 PM   Result Value Ref Range    Hemoglobin A1C 11.0 (H) 0 - 5.6 %   Thrombin time [[id number]]    Collection Time: 09/23/19 12:53 PM   Result Value Ref Range    Thrombin Time 15.9 13.0 - 19.0 sec   Partial thromboplastin time [LAB56]    Collection Time: 09/23/19 12:53 PM   Result Value Ref Range    PTT 23 22 - 37 sec   INR [[id number]]    Collection Time: 09/23/19 12:53 PM   Result Value Ref Range    INR 1.15 (H) 0.86 - 1.14   Lupus Anticoagulant Panel [PET6957]    Collection Time: 09/23/19 12:53 PM   Result Value Ref Range    Lupus Result Negative NEG^Negative   Cardiolipin Stephania IgG and IgM [LAB 6836]    Collection Time: 09/23/19 12:53 PM   Result Value Ref Range    Cardiolipin Antibody IgG <1.6 0.0  - 19.9 GPL-U/mL    Cardiolipin Antibody IgM 0.3 0.0 - 19.9 MPL-U/mL   CBC with platelets differential [AQC410]    Collection Time: 09/23/19 12:53 PM   Result Value Ref Range    WBC 5.9 4.0 - 11.0 10e9/L    RBC Count 3.02 (L) 3.8 - 5.2 10e12/L    Hemoglobin 8.6 (L) 11.7 - 15.7 g/dL    Hematocrit 27.5 (L) 35.0 - 47.0 %    MCV 91 78 - 100 fl    MCH 28.5 26.5 - 33.0 pg    MCHC 31.3 (L) 31.5 - 36.5 g/dL    RDW 13.6 10.0 - 15.0 %    Platelet Count 165 150 - 450 10e9/L    Diff Method Automated Method     % Neutrophils 73.2 %    % Lymphocytes 21.2 %    % Monocytes 3.8 %    % Eosinophils 1.0 %    % Basophils 0.3 %    % Immature Granulocytes 0.5 %    Nucleated RBCs 0 0 /100    Absolute Neutrophil 4.3 1.6 - 8.3 10e9/L    Absolute Lymphocytes 1.2 0.8 - 5.3 10e9/L    Absolute Monocytes 0.2 0.0 - 1.3 10e9/L    Absolute Eosinophils 0.1 0.0 - 0.7 10e9/L    Absolute Basophils 0.0 0.0 - 0.2 10e9/L    Abs Immature Granulocytes 0.0 0 - 0.4 10e9/L    Absolute Nucleated RBC 0.0    Quantiferon TB Gold Plus    Collection Time: 09/23/19 12:53 PM   Result Value Ref Range    Quantiferon-TB Gold Plus Result Negative NEG^Negative    TB1 Ag minus Nil Value 0.00 IU/mL    TB2 Ag minus Nil Value 0.00 IU/mL    Mitogen minus Nil Result >10.00 IU/mL    Nil Result 0.03 IU/mL   Phosphorus    Collection Time: 09/23/19 12:53 PM   Result Value Ref Range    Phosphorus 3.5 2.5 - 4.5 mg/dL   Comprehensive metabolic panel [LAB17]    Collection Time: 09/23/19 12:53 PM   Result Value Ref Range    Sodium 133 133 - 144 mmol/L    Potassium 3.7 3.4 - 5.3 mmol/L    Chloride 98 94 - 109 mmol/L    Carbon Dioxide 27 20 - 32 mmol/L    Anion Gap 8 3 - 14 mmol/L    Glucose 180 (H) 70 - 99 mg/dL    Urea Nitrogen 12 7 - 30 mg/dL    Creatinine 0.56 0.52 - 1.04 mg/dL    GFR Estimate >90 >60 mL/min/[1.73_m2]    GFR Estimate If Black >90 >60 mL/min/[1.73_m2]    Calcium 8.5 8.5 - 10.1 mg/dL    Bilirubin Total 0.2 0.2 - 1.3 mg/dL    Albumin 3.5 3.4 - 5.0 g/dL    Protein Total 7.6  6.8 - 8.8 g/dL    Alkaline Phosphatase 154 (H) 40 - 150 U/L    ALT 35 0 - 50 U/L    AST 38 0 - 45 U/L   Blood Group A Subtype [[id number]]    Collection Time: 09/23/19 12:54 PM   Result Value Ref Range    Antigen Type Canceled, Test credited     Blood Bank Comment       Patient is not ABO Type A or AB.  A1 subtyping is not applicable. 9/23/19 ak   Antibody titer red cell [[id number]]    Collection Time: 09/23/19 12:54 PM   Result Value Ref Range    Antibody Titer ANTI A: IgM 64, IgG 128    ABO/Rh type and screen    Collection Time: 09/23/19 12:54 PM   Result Value Ref Range    ABO B     RH(D) Pos     Antibody Screen Neg     Test Valid Only At          [facility]    Specimen Expires 09/26/2019    Factor 2 and 5 mutation analysis    Collection Time: 09/23/19 12:54 PM   Result Value Ref Range    Copath Report       Patient Name: CONCETTA MAYS   MR#: [id number]  Specimen #: [id number]  Collected: 9/23/2019 12:54  Received: 9/23/2019 15:24  Reported: 9/27/2019 17:45  Ordering Phy(s): JAVON MAYER  Additional Phy(s): NARESH CONNORS    For improved result formatting, select 'View Enhanced Report Format' under   Linked Documents section.  _________________________________________    TEST(S) REQUESTED:  Factor 5 Leiden and Factor 2 by PCR    SPECIMEN DESCRIPTION:  Blood    INTERPRETATION:    Factor 5 Leiden and Prothrombin G38273K testing was previously ordered on   this patient.  This is the  report  from the specimen collected 06/30/2013 04:09, [facility] number [id number]    Testing on the duplicate sample received  will be canceled and credited.    [facility] Report 06/30/2013 4:09 AM 88 Patient Name: CONCETTA MAYS   R#: [id number] #:  [id number]: 6/30/2013 04:09Received: 7/1/2013 08:18Reported:   7/2/2013 16:26Ordering Phy(s): NHI LUNA________________________________ _________TEST(S) REQUESTED:Factor 5   Leiden and Factor 2 by  PCRSPECIMEN  DESCRIPTION:BloodMETHODOLOGY:  The regions of genomic DNA containing the   H5314G Factor 5gene mutation (Factor  V Leiden) and the Factor 2(Prothrombin R40143F)gene mutation were   simultaneously amplified using the  polymerase chainreaction. The amplified products were digested with   restrictionendonuclease TaqI and products  were analyzed by gel electrophoresis.RESULTS:Factor V 1691G>A (Leiden)   RESULTS:Mutation analyzed:  1691G>AFactor V 1691G>A (Leiden) Interpretation:   ABSENTFactor V 1691G>A   (Leiden) mutation genotype:  G/GFACTOR 2/PROTHROMBIN RESULTS:Mutation analyzed:   48607A>AFactor 2   Mutation Interpretation:   ABSENTFactor  2 Mutation genotype:   G/GINTERPRETATION:The patient is negative for the   Factor V 1691G>A (Leiden) and  negativefor the Factor 2 mutation.This test was developed and its   performance determined by the [facility] oratory.It has   not been cleared or approved by the  U.S. Food and DrugAdministration. The FDA has determined that such   clearance or approvalis not necessary.  Pursuant to the requirements of CLIA'88, thislaboratory has established   and verified the test's accuracy  andprecision. This test is used for clinical purposes.Electronically   Signed Out By:Juan Manuel Guillory M.D.,  UMPhysiciansTESTING LAB [facility] [id number] [place] [phone] SITE:[id number]    Electronically Signed Out By:  DEBBIE     CPT Codes:    TESTING LAB LOCATION:  [facility]  [address] [facility] [address] [place] [address]  [phone]    COLLECTION SITE:  Client:  [facility]  Location:  [facility] (HERNANDEZ)     UA with Microscopic reflex to Culture    Collection  Time: 09/23/19 12:58 PM   Result Value Ref Range    Color Urine Yellow     Appearance Urine Clear     Glucose Urine 50 (A) NEG^Negative mg/dL    Bilirubin Urine Negative NEG^Negative    Ketones Urine Negative NEG^Negative mg/dL    Specific Gravity Urine 1.011 1.003 - 1.035    Blood Urine Negative NEG^Negative    pH Urine 7.0 5.0 - 7.0 pH    Protein Albumin Urine Negative NEG^Negative mg/dL    Urobilinogen mg/dL 0.0 0.0 - 2.0 mg/dL    Nitrite Urine Negative NEG^Negative    Leukocyte Esterase Urine Negative NEG^Negative    Source Midstream Urine     WBC Urine 2 0 - 5 /HPF    RBC Urine 1 0 - 2 /HPF    Bacteria Urine Few (A) NEG^Negative /HPF    Squamous Epithelial /HPF Urine 2 (H) 0 - 1 /HPF   ABO type [HYX5665]    Collection Time: 09/23/19  1:03 PM   Result Value Ref Range    ABO B     RH(D) Pos     Specimen Expires 09/26/2019

## 2019-09-23 NOTE — LETTER
9/23/2019       RE: Beverly Elena  [address] [facility] [address]     Dear Colleague,    Thank you for referring your patient, Beverly Elena, to the [facility] SOLID ORGAN TRANSPLANT at [facility]. Please see a copy of my visit note below.    Transplant Surgery Consult Note    Medical record number: [id number]  YOB: 1975,   Consult requested for evaluation of pancreas transplant candidacy.    Assessment and Recommendations: Ms. Tanner Elena is a fair candidate for solitary pancreas transplantation and has a excellent understanding of the risks and benefits of this approach to the management of diabetes. The following issues should be addressed prior to finalizing her transplant candidacy:     Transplant order:  Patient is a complicated history and a number of competing interest.  A successful pancreas transplant could improve both her exocrine and endocrine function.  If this were the case her diarrhea could improve, her nutrition could not prove, and she could potentially come off of her TPN.  In contrast the chronic line and TPN increases her risk of infection particularly under the setting of immunosuppression.  Further, the chronic diarrhea makes absorption of antirejection medication is more challenging.  Final decision regarding his candidacy will be made by the multidisciplinary committee.  I have praised the patient that this could be considered an increased risk transplant because of the risks of infection due to her line, increased risks of rejection due to diarrhea, increased risk of bleeding due to GI varices, and increased risk of clotting due to history of prior pulmonary embolism and likely some degree of hypercoagulability.    Varices and portal venous HTN:  Recommend CT abdomen with liver protocol including portal venous phase to assess portal vein.    The majority of our visit was spent in counselling, discussing the medical and  surgical risks of pancreas transplantation, ideal indicators of donor quality and matching strategies and the logistics of the offer/response.  We discussed approximate wait time and how that is influenced by issues such as blood type and sensitization (PRA).  Potential surgical complications of pancreas transplantation include bleeding, infection, wound complications, leak, graft thrombosis, need for reoperation and other issues such as cardiac complications, pneumonia, deep venous thrombosis, pulmonary embolism, post transplant diabetes and death. We discussed benefits and risks associated with different approaches to exocrine drainage of pancreatic secretions. We also discussed the average length of stay, recovery process, and posttransplant lab and monitoring protocol. We discussed the risk of graft rejection. I emphasized the need for strict immunosuppression adherence and the potential for complications of immunosuppression such as skin cancer or lymphoma, as well as a very low but not zero risk of donor-derived disease transmission risks (infection, cancer) and subsequent renal failure.  Ms. Tanner Elena asked good questions and the patient's candidacy will be reviewed at our Multidisciplinary Selection Committee. Thank you for the opportunity to participate in Ms. Tanner Elena's care.      Total time: 45 minutes  Counselling time: 40 minutes    .      ---------------------------------------------------------------------------------------------------    HPI: Ms. Tanner Elena has an interesting but challenging constellation of medical issues.  She had pancreatic divisum and chronic pancreatitis which led to a total pancreatectomy and auto islet transplant.  She started insulin at that time and has remained insulin-dependent since that time.  Complicating her recovery was the beginning of chronic and refractory diarrhea.  She currently has 20-30 loose bowel movements per day.  This started very soon after her  TPI AT procedure.  There was some consideration of whether this was pancreatic exocrine insufficiency she has tried a number of pancreatic enzymes without improvement in symptoms..  Due to the chronic diarrhea she has had nutritional deficiency.  She was able to maintain nutrition on tube feeds in the past.  However, she developed some complications or infections due to GJ tube and was switched to TPN.  She currently has a indwelling line and continuous TPN infusion.  A further complication is that she developed varices and portal venous clot with recurrent GIB bleeds.  She also had a pulmonary embolism and was maintained on anticoagulation for some period of time.    In addition to her symptoms of pancreatic exocrine insufficiency she has refractory diabetes.  She has hypoglycemic unawareness, neuropathy, and retinopathy.    Gastric pacemaker.      Prior GJ tubes.      Lab Results   Component Value Date    A1C 15.9 03/11/2018    A1C 13.1 07/06/2017    A1C 12.8 06/15/2017    A1C 5.9 08/05/2015    A1C 5.4 01/12/2015       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) 6/28/2013     DM     Type 1 DM, has insulin pump      Endometriosis      Endometriosis 9/9/2011     Exocrine pancreatic insufficiency 6/28/2013     Gastro-oesophageal reflux disease      Gastroparesis      Gastroparesis 7/23/2012     History of blood transfusion     2013     Iron deficiency anemia 7/12/2013     Other chronic pain      Pancreatic disease     history of pancreatitis     Pancreatic divisum      Pancreatic divisum 10/1/2012     Pneumonia, organism unspecified(486) 7/17/2013     PONV (postoperative nausea and vomiting)     Nausea     Portal vein thrombosis  6/29/2013     Recurrent acute pancreatitis 1/17/2013     Sphincter of Oddi dysfunction 1/17/2013     Syncope     X 2     Past Surgical History:   Procedure Laterality Date     APPENDECTOMY       BACK SURGERY      Neurosimulator removed 4/24/2019     CHOLECYSTECTOMY       COLONOSCOPY  5/21/2014    Procedure: COMBINED COLONOSCOPY, SINGLE BIOPSY/POLYPECTOMY BY BIOPSY;  Surgeon: Hugo Lind MD;  Location:  GI     ENDOSCOPIC INSERTION TUBE GASTROSTOMY  2/5/2014    Procedure: ENDOSCOPIC INSERTION TUBE GASTROSTOMY;  Endoscopic percutaneous gastro-jejunostomy tube placement (removal of previous gastrostomy tube)  ;  Surgeon: Sharon Oh MD;  [facility]     ENDOSCOPIC RETROGRADE CHOLANGIOPANCREATOGRAM  9/9/2011    Procedure:ENDOSCOPIC RETROGRADE CHOLANGIOPANCREATOGRAM; Endoscopic Retrograde Cholangiopancreatogram with C Arm, Biliary Sphincterotomy, insertion Bile Duct Stent X 1; Surgeon:JABIER HEATH; Location:[facility]     ENDOSCOPIC RETROGRADE CHOLANGIOPANCREATOGRAM  12/15/2011    Procedure:ENDOSCOPIC RETROGRADE CHOLANGIOPANCREATOGRAM; Endoscopic Retrograde Cholangiopancreatogram (c-arm), with pancreatic stent placement, sphincterotomy; Surgeon:JABIER HEATH; Location:[facility]     ENDOSCOPIC RETROGRADE CHOLANGIOPANCREATOGRAM  6/14/2012    Procedure: ENDOSCOPIC RETROGRADE CHOLANGIOPANCREATOGRAM;  endoscopic retrograde cholangiopancreatogram with pancreatic stent placement  (c-arm);  Surgeon: Jabier Heath MD;  Location:  OR     ESOPHAGOSCOPY, GASTROSCOPY, DUODENOSCOPY (EGD), COMBINED  11/9/2011    Procedure:COMBINED ESOPHAGOSCOPY, GASTROSCOPY, DUODENOSCOPY (EGD); Surgeon:TANIKA MOHAN; Location: GI     ESOPHAGOSCOPY, GASTROSCOPY, DUODENOSCOPY (EGD), COMBINED  5/21/2014    Procedure: COMBINED ESOPHAGOSCOPY, GASTROSCOPY, DUODENOSCOPY (EGD), BIOPSY SINGLE OR MULTIPLE;  Surgeon: Hugo Lind MD;  [facility]     HC CHANGE GASTROSTOMY TUBE PERC, WO IMAGING OR ENDO GUIDE N/A  11/25/2014    Procedure: CHANGE GASTROSTOMY TUBE WITHOUT SCOPE;  Surgeon: Sharon Oh MD;  Location: [facility] GI     HC IMPLANT PERIPH/GASTRIC NEUROSTIM/  1/11/2012    gastric pacemaker     HC REPLACE DUODENOSTOMY/JEJUNOSTOMY TUBE PERCUTANEOUS  2/12/2014    Procedure: REPLACE JEJUNOSTOMY TUBE, PERCUTANEOUS;  Surgeon: Sharon Oh MD;  [facility]: [facility]     HC UGI ENDOSCOPY W EUS  9/7/2011    Procedure:COMBINED ENDOSCOPIC ULTRASOUND, ESOPHAGOSCOPY, GASTROSCOPY, DUODENOSCOPY (EGD); Surgeon:TREE DEAN; Location:[facility] GI     HC UGI ENDOSCOPY W EUS  12/4/2012    Procedure: COMBINED ENDOSCOPIC ULTRASOUND, ESOPHAGOSCOPY, GASTROSCOPY, DUODENOSCOPY (EGD);  Surgeon: Sharon Oh MD;  [facility]: [facility]     HC UGI ENDOSCOPY W EUS  5/8/2013    Procedure: COMBINED ENDOSCOPIC ULTRASOUND, ESOPHAGOSCOPY, GASTROSCOPY, DUODENOSCOPY (EGD);  Surgeon: Tree Dean MD;  Location: [facility]     HC UGI ENDOSCOPY W PLACEMENT GASTROSTOMY TUBE PERCUT N/A 2/19/2016    Procedure: COMBINED ESOPHAGOSCOPY, GASTROSCOPY, DUODENOSCOPY (EGD), PLACE PERCUTANEOUS ENDOSCOPIC GASTROSTOMY TUBE;  Surgeon: Sharon Oh MD;  [facility]: [facility]     HERNIA REPAIR      Abd hernia repair with mesh      LAPAROSCOPIC IMPLANT GASTRIC STIMULATOR  1/11/2012    Procedure:LAPAROSCOPIC IMPLANT GASTRIC STIMULATOR; LAPAROSCOPIC IMPLANT GASTRIC STIMULATOR ; Surgeon:CHONG HART; Location[facility]     LAPAROSCOPIC SALPINGO-OOPHORECTOMY       LAPAROSCOPIC SALPINGOTOMY      R     LAPAROTOMY EXPLORATORY  6/29/2013    Procedure: LAPAROTOMY EXPLORATORY;  Exploratory laparotomy, portal vein declotting with tissue plasminogen activator administration, thrombectomy and embolectomy of portal vein, intraoperative trans-sonic flow monitoring, intraoperative ultrasound;  Surgeon: Rajendra Cruz MD;  Location[facility]     neurostimulator[  5/2011     PANCREATECTOMY, TRANSPLANT AUTO ISLET CELL, COMBINED  6/28/2013    Procedure: COMBINED PANCREATECTOMY, TRANSPLANT  AUTO ISLET CELL;  Total Pancreatectomy, Auto Islet Cell Transplant             Anesthesia General with block;  Surgeon: Rajendra Cruz MD;  [facility]     PICC INSERTION Right 2017    5fr TL Bard PICC, 37cm (3cm external) in the R basilic vein w/ tip in the low SVC     REMOVE AND REPLACE BREAST IMPLANT PROSTHESIS N/A 3/12/2018    Procedure: PERCUTANEOUS INSERTION TUBE JEJUNOSTOMY;  Percutaneous endoscopic gastrostomy to jejunostomy tube;  Surgeon: Guru Brent Ortiz MD;  Location: [facility]     TRANSPLANT      Auto Islet Transplant in  [facility]     TUBAL LIGATION       Family History   Problem Relation Age of Onset     C.A.D. Father         GI: V, D, anorexia     Blood Disease Father         lymphoma -  from pulmonary fibrosis.     Malignant Hyperthermia Brother      Diabetes Maternal Grandmother         type 2 with older age     Diabetes Maternal Grandfather         type 2 with older age     Cancer - colorectal Maternal Grandfather      Diabetes Paternal Grandmother         type 2 with older age     Connective Tissue Disorder Sister         scleroderma     Social History     Socioeconomic History     Marital status:      Spouse name: Chau     Number of children: 0     Years of education: 24     Highest education level: Not on file   Occupational History     Occupation: ER physician   Social Needs     Financial resource strain: Not on file     Food insecurity:     Worry: Not on file     Inability: Not on file     Transportation needs:     Medical: Not on file     Non-medical: Not on file   Tobacco Use     Smoking status: Never Smoker     Smokeless tobacco: Never Used   Substance and Sexual Activity     Alcohol use: No     Drug use: No     Sexual activity: Yes     Birth control/protection: Surgical     Comment: Lupron   Lifestyle     Physical activity:     Days per week: Not on file     Minutes per session: Not on file     Stress: Not on file   Relationships     Social  "connections:     Talks on phone: Not on file     Gets together: Not on file     Attends Worship service: Not on file     Active member of club or organization: Not on file     Attends meetings of clubs or organizations: Not on file     Relationship status: Not on file     Intimate partner violence:     Fear of current or ex partner: Not on file     Emotionally abused: Not on file     Physically abused: Not on file     Forced sexual activity: Not on file   Other Topics Concern     Parent/sibling w/ CABG, MI or angioplasty before 65F 55M? Not Asked      Service Not Asked     Blood Transfusions No     Caffeine Concern Not Asked     Occupational Exposure Not Asked     Hobby Hazards Not Asked     Sleep Concern Not Asked     Stress Concern Not Asked     Weight Concern Not Asked     Special Diet Not Asked     Back Care Not Asked     Exercise Not Asked     Bike Helmet Not Asked     Seat Belt Yes     Self-Exams Not Asked   Social History Narrative     Not on file     And  Allergies:   Allergies   Allergen Reactions     Penicillin G Anaphylaxis     Penicillins Anaphylaxis     06/2013 - pt tolerated ertapenem     Corticosteroids Other (See Comments)     All oral,IV and injectable steroids are contraindicated (unless in life threatening situations) in Islet Auto transplant recipients. They can cause irreversible loss of islet cell function. Please contact patients transplant care coordinator LAUREN Gerard RN at /pager   and Endocrinologist prior to administration.     Ertapenem Other (See Comments)     Elevated LFT's  Elevated LFT's     Merrem [Meropenem] Other (See Comments)     Elevated LFTs >3x, happened with invanz too  Elevated LFT's     Vancomycin Other (See Comments)     osiel syndrome     Vancomycin Other (See Comments)     Gets \"red lizandro syndrome\"       Medications:  Prescription Medications as of 9/23/2019       Rx Number Disp Refills Start End Last Dispensed Date Next Fill Date " Owning Pharmacy    ACCU-CHEK FASTCLIX LANCETS MISC  3 each 3 2013    [address]    Si each every 2 hours as needed.    Class: E-Prescribe    Notes to Pharmacy: Pt is testing up to 12 times a day    Route: Does not apply    acetaminophen (TYLENOL) 32 mg/mL solution  850 mL 0 3/18/2018    [address]    Sig: Take 20.3 mLs (650 mg) by mouth every 6 hours as needed for mild pain or fever    Class: E-Prescribe    Route: Oral    Alcohol Swabs 70 % PADS  1 each 3 2013    [address]    Sig: Use to check blood glucose 6-8 times daily    Class: E-Prescribe    amitriptyline (ELAVIL) 100 MG tablet  30 tablet 11 3/11/2015    [facility], [place] - [address] [place] [address]    Sig: Take 1 tablet (100 mg) by mouth At Bedtime    Class: E-Prescribe    Route: Oral    amylase-lipase-protease (CREON 24) 01700-93039 UNITS CPEP per EC capsule  180 capsule 1 3/18/2018    [address]    Sig: Take 1 capsule (24,000 Units) by mouth every 4 hours as needed If ReliZORB unavailable: Open capsule and empty contents into 15 ml sodium bicarb solution (see sodium bicarb order), let dissolve for about 20-30 minutes and then add this solution to the amount of TF formula hung in TF bag every 4 hrs (i.e., once TF @ goal infusion 40 ml/hr will mix 2 capsules into 160 ml of TF formula every 4 hrs).    Class: Historical    Route: Oral    amylase-lipase-protease (CREON) 90906-21733 UNITS CPEP per EC capsule  450 capsule 0 3/18/2018    [address]    Sig: Open capsule and empty contents into 15 ml sodium bicarb solution (see sodium bicarb order), let dissolve for about 20-30 minutes and then add this solution to the amount of TF formula  hung in TF bag every 4 hrs (i.e., once TF @ goal infusion 40 ml/hr will mix 2 capsules into 160 ml of TF formula every 4 hrs).    Class: Historical    BD SHARPS CONTAINER HOME MISC  1 each 0 7/12/2013    [facility] - [place], [place] - [address] [facility]    Sig: Use to dispose of used sharps    Class: E-Prescribe    bumetanide (BUMEX) 1 MG tablet    8/18/2019 8/22/2020       Sig: Take 1 mg by mouth    Class: Historical    Route: Oral    Cholecalciferol (VITAMIN D3 PO)            Sig: Take 8,000 Units by mouth daily     Class: Historical    Route: Oral    dicyclomine (BENTYL) 10 MG capsule    12/13/2017        Sig: Take one QID prn spasms.    Class: Historical    DULOXETINE HCL PO            Sig: Take 30 mg by mouth 2 times daily    Class: Historical    Route: Oral    ergocalciferol (DRISDOL) 8000 UNIT/ML drops  30 mL 11 7/6/2017    [address]    Sig: Take 0.75 mLs (6,000 Units) by mouth daily    Class: E-Prescribe    Notes to Pharmacy: Needs drop formulation due to malabsoroption issues.    Route: Oral    esomeprazole (NEXIUM) 40 MG capsule  90 capsule 3 3/11/2015    [address]    Sig: Take 1 capsule (40 mg) by mouth 2 times daily Take 30-60 minutes before eating.    Class: E-Prescribe    Route: Oral    fentaNYL (DURAGESIC) 25 mcg/hr 72 hr patch    9/12/2019        Sig: Place 1 patch onto the skin    Class: Historical    Earliest Fill Date: 9/12/2019    Route: Transdermal    gabapentin (NEURONTIN) 300 MG capsule    2/26/2019        Sig: Take 600 mg by mouth    Class: Historical    Route: Oral    glucagon (GLUCAGON EMERGENCY) 1 MG kit  1 mg 1 7/7/2017 7/7/2017   [address]    Sig: Inject 1 mg into the muscle once for 1 dose    Class: E-Prescribe    Route: Intramuscular    glucose 40 % GEL  10 each 1 7/12/2013    [facility]  [address]    Sig: Take 15 g by mouth every hour as needed.    Class: E-Prescribe    Route: Oral    glucose blood VI test strips strip  3 Month 3 2013    [address]    Sig: Use 6-8 times daily to check blood glucose levels.  (Accu-chek Smartview test strips)    Class: E-Prescribe    heparin 100 UNIT/ML SOLN injection    2019        Sig: Inject 300 Units into the vein    Class: Historical    Route: Intravenous    Insulin Aspart (INSULIN PUMP - OUTPATIENT)            Si unit/hour    Class: Historical    insulin bolus from AMBULATORY PUMP    3/18/2018    [address]    Sig: Insulin dose = 1 units for 20 grams of carbohydrate    Class: Historical    insulin bolus from AMBULATORY PUMP    3/18/2018    [address]    Sig: Inject Subcutaneous 3 times daily (before meals) Insulin dose = 1 units for 20 grams of carbohydrate    Class: Historical    Route: Subcutaneous    insulin bolus from AMBULATORY PUMP    3/18/2018    [address]    Sig: Inject Subcutaneous 4 times daily (before meals and nightly) Bolus-Correction 1 unit(s) to lower blood glucose by 75 mg/dL    Class: Historical    Route: Subcutaneous    insulin regular (HUMULIN R/NOVOLIN R VIAL) 100 UNIT/ML vial            Sig: Inject 20 Units Subcutaneous    Class: Historical    Route: Subcutaneous    lifitegrast (XIIDRA) 5 % opthalmic solution    2018       Si drop    Class: Historical    metoclopramide (REGLAN) 10 MG tablet            Sig: Take 1 tablet by mouth every 6 hours as needed    Class: Historical    Route: Oral    mirtazapine (REMERON) 15 MG tablet  30 tablet 1 3/18/2018    [address]    Sig: Take  0.5 tablets (7.5 mg) by mouth At Bedtime    Class: E-Prescribe    Route: Oral    multivitamin CF formula (AQUADEKS) LIQD liquid  120 mL 2 3/19/2018    [address]    Si mLs by Per Feeding Tube route daily    Class: E-Prescribe    Route: Per Feeding Tube    nadolol (CORGARD) 40 MG tablet    2019        Sig: Take 40 mg by mouth    Class: Historical    Route: Oral    norethindrone-ethinyl estradiol-iron (MICROGESTIN FE1.5/30) 1.5-30 MG-MCG tablet    2019        Sig: Take 1 tablet by mouth    Class: Historical    Route: Oral    ondansetron (ZOFRAN-ODT) 8 MG disintegrating tablet            Sig: Every 4-6 hours as needed    Class: Historical    Route: Oral    oxyCODONE (ROXICODONE) 5 MG/5ML solution  2100 mL 0 3/18/2018    [address]    Sig: Take 5-10 mLs (5-10 mg) by mouth every 4 hours as needed for moderate to severe pain    Class: Local Print    Earliest Fill Date: 3/18/2018    Route: Oral    polyethylene glycol (MIRALAX/GLYCOLAX) Packet  7 packet 1 3/18/2018    [address]    Sig: Take 8.5 g by mouth 2 times daily    Class: E-Prescribe    Route: Oral    Probiotic Product (PROBIOTIC DAILY PO)            Sig: Take 2 tablets by mouth daily    Class: Historical    Route: Oral    prochlorperazine (COMPAZINE) 10 MG tablet  120 tablet 0 3/18/2018    [address]    Sig: Take 1 tablet (10 mg) by mouth every 6 hours as needed for vomiting    Class: E-Prescribe    Route: Oral    rizatriptan (MAXALT) 10 MG tablet            Sig: Take 10 mg by mouth at onset of headache     Class: Historical    Route: Oral    sodium bicarbonate 325 MG tablet  180 tablet 1 3/18/2018    [address]    Si tablet (325 mg) by Per J Tube route  every 4 hours as needed (if ReliZORB unavailable) If ReliZORB unavailable: Crush 1 tablet and mix into 15 ml of warm water and use this solution to mix with Creon pancreatic enzymes. DO NOT administer directly into Jtube (to be mixed into TF formula with Creon enzyme - see Creon enzyme order)    Class: Local Print    Route: Per J Tube    temazepam (RESTORIL) 15 MG capsule    6/13/2019        Sig: Take 15 mg by mouth    Class: Historical    Route: Oral    tiZANidine (ZANAFLEX) 2 MG tablet    8/30/2019        Sig: Take 2 tablets by mouth every night at bedtime    Class: Historical          Exam    Just diagnostics:   Recent Results (from the past 672 hour(s))   EKG 12-lead, tracing only [EKG1]    Collection Time: 09/23/19 11:17 AM   Result Value Ref Range    Interpretation ECG Click View Image link to view waveform and result    CBC with platelets differential [VIL130]    Collection Time: 09/23/19 12:53 PM   Result Value Ref Range    WBC 5.9 4.0 - 11.0 10e9/L    RBC Count 3.02 (L) 3.8 - 5.2 10e12/L    Hemoglobin 8.6 (L) 11.7 - 15.7 g/dL    Hematocrit 27.5 (L) 35.0 - 47.0 %    MCV 91 78 - 100 fl    MCH 28.5 26.5 - 33.0 pg    MCHC 31.3 (L) 31.5 - 36.5 g/dL    RDW 13.6 10.0 - 15.0 %    Platelet Count 165 150 - 450 10e9/L    Diff Method Automated Method     % Neutrophils 73.2 %    % Lymphocytes 21.2 %    % Monocytes 3.8 %    % Eosinophils 1.0 %    % Basophils 0.3 %    % Immature Granulocytes 0.5 %    Nucleated RBCs 0 0 /100    Absolute Neutrophil 4.3 1.6 - 8.3 10e9/L    Absolute Lymphocytes 1.2 0.8 - 5.3 10e9/L    Absolute Monocytes 0.2 0.0 - 1.3 10e9/L    Absolute Eosinophils 0.1 0.0 - 0.7 10e9/L    Absolute Basophils 0.0 0.0 - 0.2 10e9/L    Abs Immature Granulocytes 0.0 0 - 0.4 10e9/L    Absolute Nucleated RBC 0.0    Phosphorus    Collection Time: 09/23/19 12:53 PM   Result Value Ref Range    Phosphorus 3.5 2.5 - 4.5 mg/dL   Comprehensive metabolic panel [LAB17]    Collection Time: 09/23/19 12:53 PM   Result Value Ref  Range    Sodium 133 133 - 144 mmol/L    Potassium 3.7 3.4 - 5.3 mmol/L    Chloride 98 94 - 109 mmol/L    Carbon Dioxide 27 20 - 32 mmol/L    Anion Gap 8 3 - 14 mmol/L    Glucose 180 (H) 70 - 99 mg/dL    Urea Nitrogen 12 7 - 30 mg/dL    Creatinine 0.56 0.52 - 1.04 mg/dL    GFR Estimate >90 >60 mL/min/[1.73_m2]    GFR Estimate If Black >90 >60 mL/min/[1.73_m2]    Calcium 8.5 8.5 - 10.1 mg/dL    Bilirubin Total 0.2 0.2 - 1.3 mg/dL    Albumin 3.5 3.4 - 5.0 g/dL    Protein Total 7.6 6.8 - 8.8 g/dL    Alkaline Phosphatase 154 (H) 40 - 150 U/L    ALT 35 0 - 50 U/L    AST 38 0 - 45 U/L   UA with Microscopic reflex to Culture    Collection Time: 09/23/19 12:58 PM   Result Value Ref Range    Color Urine Yellow     Appearance Urine Clear     Glucose Urine 50 (A) NEG^Negative mg/dL    Bilirubin Urine Negative NEG^Negative    Ketones Urine Negative NEG^Negative mg/dL    Specific Gravity Urine 1.011 1.003 - 1.035    Blood Urine Negative NEG^Negative    pH Urine 7.0 5.0 - 7.0 pH    Protein Albumin Urine Negative NEG^Negative mg/dL    Urobilinogen mg/dL 0.0 0.0 - 2.0 mg/dL    Nitrite Urine Negative NEG^Negative    Leukocyte Esterase Urine Negative NEG^Negative    Source Midstream Urine     WBC Urine 2 0 - 5 /HPF    RBC Urine 1 0 - 2 /HPF    Bacteria Urine Few (A) NEG^Negative /HPF    Squamous Epithelial /HPF Urine 2 (H) 0 - 1 /HPF       Again, thank you for allowing me to participate in the care of your patient.      Sincerely,    DONI

## 2019-09-23 NOTE — PROGRESS NOTES
"Pancreas Transplant Evaluation - 9/23/2019  Beverly Elena attended the pre-transplant patient education class today. She was accompanied by her . They were both attentive and asked a few good questions. The [facility] website pre-transplant modules were viewed; class participants were educated on using the site.     Content reviewed:    Living Donation and how to access that program    Paired exchange    Kidney Donor Profile Index (KDPI)    Waiting list issues (right to decline without penalty, high PHS risk donors, what to expect when called with an offer)    Hospital experience,  length of stay , need to stay locally post-discharge (2-4 weeks)    Surgical options (with pictures)                             Post-surgery lifting and driving restrictions    Post-transplant routines, frequency of lab work and clinic visits    Need to stay locally post-discharge (2-4 weeks)    Role of Transplant Coordinator    Participants were informed of the benefits of transplant as well as potential risks such as infection, cancer, and death.  The need for total adherence with immunosuppression medications and following transplant regimens was stressed.  The overall evaluation/approval/listing process was reviewed.        The patient was provided with the following documents:  What You Need to Know About a Pancreas Transplant  Adult Pancreas Transplant-A Guide for Patients  SRTR Data Sheet - Pancreas  Brochure - Pancreas  Brochure - Multiple Listing and Waiting Time Transfer  What Every Patient Needs to Know (UNOS)  UNOS Facts and Figures  Finding a Donor  [facility] - Quick Start Guide  Receipt of Information form    Beverly Elena signed the  Receipt of Information for Organ Transplant Recipient.\" She was provided [employer] business card and instructed to call with additional questions.      Summary    Team s concerns/comments: Many concerns were expressed. She will need to see GI " regarding her portal HTN, varices and diarrhea. Needs up to date gastric emptying study. On TPN and will need a gastric tube before transplant. Considering IS trial once her diarrhea is managed. Trouble emptying bladder and will need to see urology. Bleeding and clotting for her history of PE's. Goal will be albumin >3 for healing. Cardiology once she is closer to transplant.     Candidacy category: Yellow     Action/Plan: continue evaluation    Expected Selection Meeting Discussion: [place] October 2, 2019

## 2019-09-23 NOTE — LETTER
"9/23/2019       RE: Beverly Elena  [address] [facility] [id number]     Dear Colleague,    Thank you for referring your patient, Beverly Elena, to the [facility] SOLID ORGAN TRANSPL[facility] at [facility]. Please see a copy of my visit note below.      Assessment and Plan:  # Pancreas Transplant Evaluation: Patient is a fair candidate overall. Benefits of a living donor transplant were discussed.    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    # Type 1 diabetes: poorly controlled using 40-60 units of insulin per day with pump with hemoglobin A1c levels ranging 10-16% for the last year and a half. She has hypoglycemic unawareness in the 30-40s about once per week. She is a planning to get a sensor and follows with [place] [facility].    # Pancreatic insufficiency, chronic diarrhea, malabsorption: maintained on Creon. Having 20-30 loose/oily stools per day. Previously on tube feeding. G J-tube removed due to wound infection (now healed). Albumin levels range 2-3. On TPN. Weight has remain stable with a BMI 22. She will need further work up for diarrhea with local GI. Once her diarrhea and nutritional status is optimized, would consider immunosuppression trial.     # Gastroparesis s/p gastric pacemaker placement. Battery is due to be changed. She complains of daily nausea and poor appetite that is under reasonable control with as needed ondansetron and metoclopramide as she rarely has vomiting. Will need updated gastric emptying study as the last on was over 3 years ago.     # Chronic abdominal  cramping\" pain: managing with 10 mg of oxycodone once every few days and daily gabapentin, cymbalta and fentanyl patch. This is followed by local pain clinic.    # Cardiac Risk: no history of cardiac disease or events.  Her last ECHO in 8/2019 showed an " EF of 60% and mild pulmonary hypertension with a PA pressure of 46 mmHg. She has not had a recent stress test. Would need cardiology clearance and further testing for CAD.     # H/o portal vein thrombosis: after autoislet transplant, complicated by portal hypertension with associated grade II esophageal varices. Would consider checking portal vein pressures, although this appears to be recannulated as most recent abd US in 9/2019 showed patent portal vein with flow in the correct direction with appropriate velocities and waveforms. No definite main portal vein thrombus. Liver appeared slightly heterogeneous.      # H/o PEs: with a small subsegmental PE in 3/2018 after a week-long hospitalization for G J-tube placement. Source of clot thought to be from tunneled central catheter. She was treated Coumadin and also Lovenox due to poor anticoagulant absorption. Her course was complicated by acute upper GI bleeding in the setting of supra therapeutic INR and anticoagulation was discontinued. She had recent bilateral septic emboli in the setting of central line associated bacteremia. Given history of PEs and GI bleeding, recommend bleeding and clotting clinic evaluation.     # H/o GI bleeding: in 4/2018 in the setting of supra therapeutic INR with an EGD in 4/2018 that showed grade 1 esophageal varices and non-bleeding erosive gastropathy. She had another recent episode in 8/2019 with an EGD showed small non-bleeding grade II esophageal varices and multiple nonbleeding gastric and duodenal ulcers. Upper GI source thought unlikely, thought possibly from distal small bowel vs anastomoses for her history of pancreatectomy vs colon (although colonoscopy showed no source of bleeding).     # Recurrent line-associated blood infections: 4/2018 (klebsiella pneumoniae), 9/2018 (MRSA), 2/2019 (candida glabrata), 8/2019 (giulia glabrata). Local ID following. She completed vancomycin and micafungin treatment sometime in the past  several weeks.     # CKD II: with baseline creatinine of 1 mg/dL. No significant proteinuria in 2018.     # Deconditioned: patient reports she may be able to walk a quarter of a mile, but struggles with a flight of stairs due to shortness of breath and lower extremity fatigue. She is planning for an elevator to be placed in newly built home.     # Incomplete bladder emptying: needs urology consult.    # Health Maintenance: 7/2019 mammogram, 7/2019 pap smear (NIL, HPV negative) and dental UTD.    Discussed the risks and benefits of a transplant, including the risk of surgery and immunosuppression medications.  Patient's overall evaluation will be discussed in the Transplant Program's regular meeting with a final recommendation on the patients suitability for transplant to be made at that time.  Patient was seen in conjunction with Dr. Jarvis Johnson as part of a shared visit.    Evaluation:  Beverly Elena was seen in consultation at the request of Dr. Devyn Chowdary for evaluation as a potential pancreas transplant recipient.    Reason for Visit:  Beverly Elena is a 43 year old female with Type 1 Diabetes, who presents for pancreas transplant evaluation.    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  She had a complicated course postoperatively including portal vein thrombosis with portal hypertension, poorly controlled diabetes with unawareness, gastroparesis s/p gastric pacemaker placement, chronic abdominal pain, chronic diarrhea, malabsorption, weight loss, failure to thrive, PEs, GI bleeding and recurrent central line-associated blood infections. She had a GJ tube placed in 2017 that was recently removed due to a chronic necrotic  "wound (since healed), however, she has continued with long standing TPN use.       Pancreatic insufficiency, chronic diarrhea, malabsorption    She reports she tried many different pancreatic enzyme replacement therapies and is currently using Creon with meals. She has 20-30 loose/oily stools per day, this was thought to be secondary to fat malabsorption when seen last by GI last in 2018. Further work up for diarrhea was recommended at that time, but she still needs follow up. She has hypoalbuminemia with levels ranging 2s-3s. Weight has remain stable with a BMI 22.     Gastroparesis   S/p gastric pacemaker placed 3 years ago.  She reports she likely needs the battery changed as she feels like it is not working properly.  She complains of daily nausea and poor appetite that is under reasonable control with as needed ondansetron and metoclopramide as she rarely has vomiting. Her last gastric emptying study was over 3 years ago.      Chronic abdominal  \"cramping\" pain  Managing with 10 mg of oxycodone once every few days and daily gabapentin, cymbalta and fentanyl patch. This is followed by local pain clinic. Of note, she also has been diagnosed with endometriosis s/p single oophorectomy and bilateral tubal removal. Other abdominal surgeries include cholecystectomy and ventral hernia repair with 14 x 24 cm soft mesh. Surgery records at that time noted extensive intra-abdominal adhesions.    Type 1 diabetes:  Currently using 40-60 units of insulin per day, with an insulin pump, Her diabetes has been difficult to control with hemoglobin A1c levels ranging 10-16% for the last year and a half. She also has hypoglycemic unawareness in the 30-40s about once per week. She is a planning to get a sensor and follows with [place] [facility].    Portal Hypertension   Secondary to sarah-vein thromboses after autoislet transplant with chronic varices (small non bleeding grade II on 8/2019 EGD).  Portal hypertension appears " recannulated on 9/2019 abd US that showed patent portal vein and flow is in the correct direction with appropriate velocities and waveforms. No definite main portal vein thrombus. Liver appeared slightly heterogeneous.     History of PEs/GI bleeding   She had a small subsegmental PE was on 3/22/2018 after a week-long hospitalization for G J-tube placement. Source of clot thought to be from tunneled central catheter. She was started on Coumadin and also Lovenox due to poor anticoagulant absorption. Her course was complicated by acute upper GI bleeding in the setting of supra therapeutic INR with an EGD in 4/2018 that shoed grade 1 esophageal varices and non-bleeding erosive gastropathy. In 8/2019 she had bilateral septic emboli in the setting of central line associated bacteremia and GI bleeding. An EGD showed small non-bleeding grade II esophageal varices and multiple nonbleeding gastric and duodenal ulcers. Upper GI source thought unlikely, instead thought to be from distal small bowel vs her anastomoses for her history of pancreatectomy vs colon (although colonoscopy showed no source of bleeding).     Recurrent line-associated blood infections   4/2018 (klebsiella pneumoniae), 9/2018 (MRSA), 2/2019 (candida glabrata), 8/2019 (giulia glabrata). ID following. She completed vancomycin and micafungin treatment sometime in the past several weeks.     Cardiac and Functional Capacity  She has no history of cardiac disease or events.  Her last ECHO in 8/2019 showed an EF of 60% and mild pulmonary hypertension with a PA pressure of 46 mmHg. She has not had a recent stress test.  She is fairly deconditioned and struggles doing a flight of stairs due to shortness of breath and lower extremity fatigue. With exertion she denies chest pain.  She reports she might be able to walk a quarter of a mile.  She and her  are planning to build a new home with an elevator for her.       CKD  Likley from diabetes and recurrent  CAMERON, with baseline creatinine of 1.0-1.3 and associated GFRs ranging 30s-60s. She had 100 mg/dl of abuminuria in 2/2019.  She continues to make good urine output and denies dysuria and hematuria but does complain of some trouble emptying her bladder and has to lean over to complete empty on a daily basis.     Psychosocial   She lives with her  in [place] and does not have any children.  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           Kidney Disease Hx:        Kidney Disease Dx: likely DM I and recurrent CAMERON        Biopsy Proven: No         On Dialysis: No       Primary Nephrologist: N/A       H/o Kidney Stones: No       H/o Recurrent/Frequent UTI: No         Diabetic Hx:        Diagnosis Date: 2013       Medication History: 40-60 units of insulin with pump       Diabetic Control: Poorly controlled (HbA1c >9%)   Last HbA1c: 10-16%       Hypoglycemic Unawareness: Yes; once per week       End-Organ Damage due to DM: Nephropathy         Cardiac/Vascular Disease Risk Factors:        Cardiac Risk Factors: Diabetes, Hypertension and CKD       Known CAD: No       Known PAD/Caludication Symptoms: No       Known Heart Failure: No       Arrhythmia: No       Pulmonary Hypertension: Yes; last  PA pressure of 46 mmHg.       Valvular Disease: No       Other: None            Fatigue/Decreased Energy: [] No [x] Yes    Chest Pain or SOB with Exertion: [] No [x] Yes    Significant Weight Change: [x] No [] Yes Varies between 100-120 lbs    Nausea, Vomiting or Diarrhea: [] No [x] Yes    Fever, Sweats or Chills:  [x] No [] Yes    Leg Swelling [] No [x] Yes         History of Cancer: None      Review of Systems:  A comprehensive review of systems was obtained and negative, except as noted in the HPI or PMH.    Past Medical History:   Medical record was reviewed and PMH was discussed with patient and noted below.  Past Medical History:   Diagnosis Date      Absence of pancreas, acquired total 6/28/2013     Bile duct disease      Chronic pain      Chronic pancreatitis (H) 6/28/2013     Chronic relapsing pancreatitis (H) 6/28/2013     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) 6/28/2013     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) 6/28/2013     DM     Type 1 DM, has insulin pump      Endometriosis      Endometriosis 9/9/2011     Exocrine pancreatic insufficiency 6/28/2013     Gastro-oesophageal reflux disease      Gastroparesis      Gastroparesis 7/23/2012     History of blood transfusion     2013     Iron deficiency anemia 7/12/2013     Other chronic pain      Pancreatic disease     history of pancreatitis     Pancreatic divisum      Pancreatic divisum 10/1/2012     Pneumonia, organism unspecified(486) 7/17/2013     PONV (postoperative nausea and vomiting)     Nausea     Portal vein thrombosis 6/29/2013     Recurrent acute pancreatitis 1/17/2013     Sphincter of Oddi dysfunction 1/17/2013     Syncope     X 2       Past Social History:   Past Surgical History:   Procedure Laterality Date     APPENDECTOMY       BACK SURGERY      Neurosimulator removed 4/24/2019     CHOLECYSTECTOMY       COLONOSCOPY  5/21/2014    Procedure: COMBINED COLONOSCOPY, SINGLE BIOPSY/POLYPECTOMY BY BIOPSY;  Surgeon: Hugo Lind MD;  Location:  GI     ENDOSCOPIC INSERTION TUBE GASTROSTOMY  2/5/2014    Procedure: ENDOSCOPIC INSERTION TUBE GASTROSTOMY;  Endoscopic percutaneous gastro-jejunostomy tube placement (removal of previous gastrostomy tube)  ;  Surgeon: Sharon Oh MD;  Location:  OR     ENDOSCOPIC RETROGRADE CHOLANGIOPANCREATOGRAM  9/9/2011    Procedure:ENDOSCOPIC RETROGRADE CHOLANGIOPANCREATOGRAM; Endoscopic Retrograde Cholangiopancreatogram with C Arm, Biliary Sphincterotomy, insertion Bile Duct Stent X 1; Surgeon:SU GONZALES; Location: OR     ENDOSCOPIC RETROGRADE CHOLANGIOPANCREATOGRAM  12/15/2011    Procedure:ENDOSCOPIC RETROGRADE  CHOLANGIOPANCREATOGRAM; Endoscopic Retrograde Cholangiopancreatogram (c-arm), with pancreatic stent placement, sphincterotomy; Surgeon:JABIER HEATH; Location:[facility] OR     ENDOSCOPIC RETROGRADE CHOLANGIOPANCREATOGRAM  6/14/2012    Procedure: ENDOSCOPIC RETROGRADE CHOLANGIOPANCREATOGRAM;  endoscopic retrograde cholangiopancreatogram with pancreatic stent placement  (c-arm);  Surgeon: Jabier Heath MD;  Location: [facility] OR     ESOPHAGOSCOPY, GASTROSCOPY, DUODENOSCOPY (EGD), COMBINED  11/9/2011    Procedure:COMBINED ESOPHAGOSCOPY, GASTROSCOPY, DUODENOSCOPY (EGD); Surgeon:TREE DEAN; Location:[facility] GI     ESOPHAGOSCOPY, GASTROSCOPY, DUODENOSCOPY (EGD), COMBINED  5/21/2014    Procedure: COMBINED ESOPHAGOSCOPY, GASTROSCOPY, DUODENOSCOPY (EGD), BIOPSY SINGLE OR MULTIPLE;  Surgeon: Hugo Lind MD;  Location: [facility] GI     HC CHANGE GASTROSTOMY TUBE PERC, WO IMAGING OR ENDO GUIDE N/A 11/25/2014    Procedure: CHANGE GASTROSTOMY TUBE WITHOUT SCOPE;  Surgeon: Sharon Oh MD;  Location: [facility] GI     HC IMPLANT PERIPH/GASTRIC NEUROSTIM/  1/11/2012    gastric pacemaker     HC REPLACE DUODENOSTOMY/JEJUNOSTOMY TUBE PERCUTANEOUS  2/12/2014    Procedure: REPLACE JEJUNOSTOMY TUBE, PERCUTANEOUS;  Surgeon: Sharon Oh MD;  Location: [facility] OR     HC UGI ENDOSCOPY W EUS  9/7/2011    Procedure:COMBINED ENDOSCOPIC ULTRASOUND, ESOPHAGOSCOPY, GASTROSCOPY, DUODENOSCOPY (EGD); Surgeon:TREE DEAN; Location[facility]     HC UGI ENDOSCOPY W EUS  12/4/2012    Procedure: COMBINED ENDOSCOPIC ULTRASOUND, ESOPHAGOSCOPY, GASTROSCOPY, DUODENOSCOPY (EGD);  Surgeon: Sharon Oh MD;  Location: [facility]     HC UGI ENDOSCOPY W EUS  5/8/2013    Procedure: COMBINED ENDOSCOPIC ULTRASOUND, ESOPHAGOSCOPY, GASTROSCOPY, DUODENOSCOPY (EGD);  Surgeon: Tree Dean MD;  Location: [facility]     HC UGI ENDOSCOPY W PLACEMENT GASTROSTOMY TUBE PERCUT N/A 2/19/2016    Procedure: COMBINED ESOPHAGOSCOPY, GASTROSCOPY, DUODENOSCOPY (EGD), PLACE  PERCUTANEOUS ENDOSCOPIC GASTROSTOMY TUBE;  Surgeon: Sharon Oh MD;  Location: [facility]     HERNIA REPAIR      Abd hernia repair with mesh      LAPAROSCOPIC IMPLANT GASTRIC STIMULATOR  2012    Procedure:LAPAROSCOPIC IMPLANT GASTRIC STIMULATOR; LAPAROSCOPIC IMPLANT GASTRIC STIMULATOR ; Surgeon:CHONG HART; Location:[facility] OR     LAPAROSCOPIC SALPINGO-OOPHORECTOMY       LAPAROSCOPIC SALPINGOTOMY      R     LAPAROTOMY EXPLORATORY  2013    Procedure: LAPAROTOMY EXPLORATORY;  Exploratory laparotomy, portal vein declotting with tissue plasminogen activator administration, thrombectomy and embolectomy of portal vein, intraoperative trans-sonic flow monitoring, intraoperative ultrasound;  Surgeon: Rajendra Cruz MD;  Location: [facility] OR     neurostimulator[  2011     PANCREATECTOMY, TRANSPLANT AUTO ISLET CELL, COMBINED  2013    Procedure: COMBINED PANCREATECTOMY, TRANSPLANT AUTO ISLET CELL;  Total Pancreatectomy, Auto Islet Cell Transplant             Anesthesia General with block;  Surgeon: Rajendra Cruz MD;  Location: [facility] OR     PICC INSERTION Right 2017    5fr TL Bard PICC, 37cm (3cm external) in the R basilic vein w/ tip in the low SVC     REMOVE AND REPLACE BREAST IMPLANT PROSTHESIS N/A 3/12/2018    Procedure: PERCUTANEOUS INSERTION TUBE JEJUNOSTOMY;  Percutaneous endoscopic gastrostomy to jejunostomy tube;  Surgeon: Guru Brent Ortiz MD;  Location: [facility] OR     TRANSPLANT      Auto Islet Transplant in  [facility]     TUBAL LIGATION       Personal history of bleeding or anesthesia problems: No    Family History:  Family History   Problem Relation Age of Onset     C.A.D. Father         GI: V, D, anorexia     Blood Disease Father         lymphoma -  from pulmonary fibrosis.     Malignant Hyperthermia Brother      Diabetes Maternal Grandmother         type 2 with older age     Diabetes Maternal Grandfather         type 2 with older age     Cancer - colorectal Maternal  Grandfather      Diabetes Paternal Grandmother         type 2 with older age     Connective Tissue Disorder Sister         scleroderma       Personal History:   Social History     Socioeconomic History     Marital status:      Spouse name: Chau     Number of children: 0     Years of education: 24     Highest education level: Not on file   Occupational History     Occupation: ER physician   Social Needs     Financial resource strain: Not on file     Food insecurity:     Worry: Not on file     Inability: Not on file     Transportation needs:     Medical: Not on file     Non-medical: Not on file   Tobacco Use     Smoking status: Never Smoker     Smokeless tobacco: Never Used   Substance and Sexual Activity     Alcohol use: No     Drug use: No     Sexual activity: Yes     Birth control/protection: Surgical     Comment: Lupron   Lifestyle     Physical activity:     Days per week: Not on file     Minutes per session: Not on file     Stress: Not on file   Relationships     Social connections:     Talks on phone: Not on file     Gets together: Not on file     Attends [employer]ism service: Not on file     Active member of club or organization: Not on file     Attends meetings of clubs or organizations: Not on file     Relationship status: Not on file     Intimate partner violence:     Fear of current or ex partner: Not on file     Emotionally abused: Not on file     Physically abused: Not on file     Forced sexual activity: Not on file   Other Topics Concern     Parent/sibling w/ CABG, MI or angioplasty before 65F 55M? Not Asked      Service Not Asked     Blood Transfusions No     Caffeine Concern Not Asked     Occupational Exposure Not Asked     Hobby Hazards Not Asked     Sleep Concern Not Asked     Stress Concern Not Asked     Weight Concern Not Asked     Special Diet Not Asked     Back Care Not Asked     Exercise Not Asked     Bike Helmet Not Asked     Seat Belt Yes     Self-Exams Not Asked   Social History  "Narrative     Not on file       Allergies:  Allergies   Allergen Reactions     Penicillin G Anaphylaxis     Penicillins Anaphylaxis     06/2013 - pt tolerated ertapenem     Corticosteroids Other (See Comments)     All oral,IV and injectable steroids are contraindicated (unless in life threatening situations) in Islet Auto transplant recipients. They can cause irreversible loss of islet cell function. Please contact patients transplant care coordinator LAUREN Gerard RN at /pager   and Endocrinologist prior to administration.     Ertapenem Other (See Comments)     Elevated LFT's  Elevated LFT's     Merrem [Meropenem] Other (See Comments)     Elevated LFTs >3x, happened with invanz too  Elevated LFT's     Vancomycin Other (See Comments)     osiel syndrome     Vancomycin Other (See Comments)     Gets \"red lizandro syndrome\"       Medications:  Current Outpatient Medications   Medication Sig     ACCU-CHEK FASTCLIX LANCETS MISC 1 each every 2 hours as needed.     acetaminophen (TYLENOL) 32 mg/mL solution Take 20.3 mLs (650 mg) by mouth every 6 hours as needed for mild pain or fever     Alcohol Swabs 70 % PADS Use to check blood glucose 6-8 times daily     amitriptyline (ELAVIL) 100 MG tablet Take 1 tablet (100 mg) by mouth At Bedtime     amylase-lipase-protease (CREON 24) 90001-97944 UNITS CPEP per EC capsule Take 1 capsule (24,000 Units) by mouth every 4 hours as needed If ReliZORB unavailable: Open capsule and empty contents into 15 ml sodium bicarb solution (see sodium bicarb order), let dissolve for about 20-30 minutes and then add this solution to the amount of TF formula hung in TF bag every 4 hrs (i.e., once TF @ goal infusion 40 ml/hr will mix 2 capsules into 160 ml of TF formula every 4 hrs).     BD SHARPS CONTAINER HOME MISC Use to dispose of used sharps     bumetanide (BUMEX) 1 MG tablet Take 1 mg by mouth     Cholecalciferol (VITAMIN D3 PO) Take 8,000 Units by mouth daily      dicyclomine " (BENTYL) 10 MG capsule Take one QID prn spasms.     ergocalciferol (DRISDOL) 8000 UNIT/ML drops Take 0.75 mLs (6,000 Units) by mouth daily     esomeprazole (NEXIUM) 40 MG capsule Take 1 capsule (40 mg) by mouth 2 times daily Take 30-60 minutes before eating.     fentaNYL (DURAGESIC) 25 mcg/hr 72 hr patch Place 1 patch onto the skin     gabapentin (NEURONTIN) 300 MG capsule Take 600 mg by mouth     glucose 40 % GEL Take 15 g by mouth every hour as needed.     glucose blood VI test strips strip Use 6-8 times daily to check blood glucose levels.  (Accu-chek Smartview test strips)     heparin 100 UNIT/ML SOLN injection Inject 300 Units into the vein     Insulin Aspart (INSULIN PUMP - OUTPATIENT) 1 unit/hour     insulin bolus from AMBULATORY PUMP Insulin dose = 1 units for 20 grams of carbohydrate     insulin regular (HUMULIN R/NOVOLIN R VIAL) 100 UNIT/ML vial Inject 20 Units Subcutaneous     lifitegrast (XIIDRA) 5 % opthalmic solution 1 drop     metoclopramide (REGLAN) 10 MG tablet Take 1 tablet by mouth every 6 hours as needed     multivitamin CF formula (AQUADEKS) LIQD liquid 4 mLs by Per Feeding Tube route daily     nadolol (CORGARD) 40 MG tablet Take 40 mg by mouth     norethindrone-ethinyl estradiol-iron (MICROGESTIN FE1.5/30) 1.5-30 MG-MCG tablet Take 1 tablet by mouth     ondansetron (ZOFRAN-ODT) 8 MG disintegrating tablet Every 4-6 hours as needed     oxyCODONE (ROXICODONE) 5 MG/5ML solution Take 5-10 mLs (5-10 mg) by mouth every 4 hours as needed for moderate to severe pain     Probiotic Product (PROBIOTIC DAILY PO) Take 2 tablets by mouth daily     prochlorperazine (COMPAZINE) 10 MG tablet Take 1 tablet (10 mg) by mouth every 6 hours as needed for vomiting     rizatriptan (MAXALT) 10 MG tablet Take 10 mg by mouth at onset of headache      sodium bicarbonate 325 MG tablet 1 tablet (325 mg) by Per J Tube route every 4 hours as needed (if ReliZORB unavailable) If ReliZORB unavailable: Crush 1 tablet and mix into  "15 ml of warm water and use this solution to mix with Creon pancreatic enzymes. DO NOT administer directly into Jtube (to be mixed into TF formula with Creon enzyme - see Creon enzyme order)     temazepam (RESTORIL) 15 MG capsule Take 15 mg by mouth     tiZANidine (ZANAFLEX) 2 MG tablet Take 2 tablets by mouth every night at bedtime     amylase-lipase-protease (CREON) 24318-61291 UNITS CPEP per EC capsule Open capsule and empty contents into 15 ml sodium bicarb solution (see sodium bicarb order), let dissolve for about 20-30 minutes and then add this solution to the amount of TF formula hung in TF bag every 4 hrs (i.e., once TF @ goal infusion 40 ml/hr will mix 2 capsules into 160 ml of TF formula every 4 hrs).     DULOXETINE HCL PO Take 30 mg by mouth 2 times daily     glucagon (GLUCAGON EMERGENCY) 1 MG kit Inject 1 mg into the muscle once for 1 dose     insulin bolus from AMBULATORY PUMP Inject Subcutaneous 3 times daily (before meals) Insulin dose = 1 units for 20 grams of carbohydrate     insulin bolus from AMBULATORY PUMP Inject Subcutaneous 4 times daily (before meals and nightly) Bolus-Correction 1 unit(s) to lower blood glucose by 75 mg/dL     mirtazapine (REMERON) 15 MG tablet Take 0.5 tablets (7.5 mg) by mouth At Bedtime (Patient not taking: Reported on 9/23/2019)     polyethylene glycol (MIRALAX/GLYCOLAX) Packet Take 8.5 g by mouth 2 times daily (Patient not taking: Reported on 9/23/2019)     No current facility-administered medications for this visit.        Vitals:  /74   Pulse 117   Temp 98.7  F (37.1  C)   Ht 1.555 m (5' 1.22\")   Wt 53.7 kg (118 lb 6.4 oz)   SpO2 98%   Breastfeeding? No   BMI 22.21 kg/m       Exam:  GENERAL APPEARANCE: alert and no distress  HENT: mouth without ulcers or lesions  LYMPHATICS: no cervical or supraclavicular nodes  RESP: lungs clear to auscultation - no rales, rhonchi or wheezes  CV: regular rhythm, normal rate, no rub, no murmur  FEMORAL PULSES: 2+ equal " bilaterally.   EDEMA: no LE edema bilaterally  ABDOMEN: soft, nondistended, nontender, bowel sounds normal  MS: extremities normal - no gross deformities noted, no evidence of inflammation in joints, no muscle tenderness  SKIN: no rash    Results:   Recent Results (from the past 336 hour(s))   EKG 12-lead, tracing only [EKG1]    Collection Time: 09/23/19 11:17 AM   Result Value Ref Range    Interpretation ECG Click View Image link to view waveform and result    HLA Typing Complete SOT Recipient    Collection Time: 09/23/19 12:53 PM   Result Value Ref Range    ABTest Method SSOP     A* locus A*03     A* A*11     B* locus B*07     B* B*35     C* locus C*04     C* C*07     Bw-1 Bw*6     Drsso Test Method SSOP     DRB1* locus DRB1*01     DRB1* DRB1*11     DRB3* locus DRB3*02     DQB1* locus DQB1*03(07)     DQB1* DQB1*05     DQA1*locus DQA1*01     DQA1* DQA1*05     DPB1* DPB1*04:01     DPB1* NMDP BSBUJ     DPB1*locus DPB1*10:01     DPB1* locus NMDP BMJWZ     DPA1* DPA1*01:03     DPA1* NMDP BPGMP     DPA1*locus DPA1*02:01     DPA1* locus NMDP CBVG    PRA Single Antigen IgG Antibody    Collection Time: 09/23/19 12:53 PM   Result Value Ref Range    SA1 Test Method SA FCS     SA1 Cell Class I     SA1 Hi Risk Stephania A:2 23 24 68 69 B:57 58     SA1 Mod Risk Stephania A:26 32 43 66     SA1 Comments        Test performed by modified procedure. Serum heat inactivated and tested   by a modified ([place]) protocol including fetal calf serum addition.   High-risk, mfi >3,000. Mod-risk, mfi 500-3,000.      SA2 Test Method SA FCS     SA2 Cell Class II     SA2 Hi Risk Stephania None     SA2 Mod Risk Stephania None     SA2 Comments        Test performed by modified procedure. Serum heat inactivated and tested   by a modified ([place]) protocol including fetal calf serum addition.   High-risk, mfi >3,000. Mod-risk, mfi 500-3,000.      Protocol Cutoff Plan A, 500 mfi cumulative      UNOS cPRA 80     Unacceptable Antigen A:2 23 24 26 32 43 66 68 69 B:57 58    CMV  Antibody IgG [ROW4199]    Collection Time: 09/23/19 12:53 PM   Result Value Ref Range    CMV Antibody IgG <0.2 0.0 - 0.8 AI   EBV Capsid Antibody IgG [IYG7867]    Collection Time: 09/23/19 12:53 PM   Result Value Ref Range    EBV Capsid Antibody IgG 0.6 0.0 - 0.8 AI   Hepatitis B core antibody [[id number]]    Collection Time: 09/23/19 12:53 PM   Result Value Ref Range    Hepatitis B Core Stephania Nonreactive NR^Nonreactive   Hepatitis B Surface Antibody [[id number]]    Collection Time: 09/23/19 12:53 PM   Result Value Ref Range    Hepatitis B Surface Antibody 29.78 (H) <8.00 m[IU]/mL   Hepatitis B surface antigen [PEL484]    Collection Time: 09/23/19 12:53 PM   Result Value Ref Range    Hep B Surface Agn Nonreactive NR^Nonreactive   Hepatitis C antibody [OSL878]    Collection Time: 09/23/19 12:53 PM   Result Value Ref Range    Hepatitis C Antibody Nonreactive NR^Nonreactive   HIV Antigen Antibody Combo Pretransplant    Collection Time: 09/23/19 12:53 PM   Result Value Ref Range    HIV Antigen Antibody Combo Pretransplant Nonreactive NR^Nonreactive   Varicella Zoster Virus Antibody IgG [[id number]]    Collection Time: 09/23/19 12:53 PM   Result Value Ref Range    Varicella Zoster Virus Antibody IgG 1.2 (H) 0.0 - 0.8 AI   Treponema Abs w Reflex to RPR and Titer    Collection Time: 09/23/19 12:53 PM   Result Value Ref Range    Treponema Antibodies Nonreactive NR^Nonreactive   C-peptide    Collection Time: 09/23/19 12:53 PM   Result Value Ref Range    C Peptide 0.2 (L) 0.9 - 6.9 ng/mL   Hemoglobin A1c    Collection Time: 09/23/19 12:53 PM   Result Value Ref Range    Hemoglobin A1C 11.0 (H) 0 - 5.6 %   Thrombin time [TOG520]    Collection Time: 09/23/19 12:53 PM   Result Value Ref Range    Thrombin Time 15.9 13.0 - 19.0 sec   Partial thromboplastin time [LAB56]    Collection Time: 09/23/19 12:53 PM   Result Value Ref Range    PTT 23 22 - 37 sec   INR [BCB0043]    Collection Time: 09/23/19 12:53 PM   Result Value Ref Range    INR 1.15  (H) 0.86 - 1.14   Lupus Anticoagulant Panel [PYC1048]    Collection Time: 09/23/19 12:53 PM   Result Value Ref Range    Lupus Result Negative NEG^Negative   Cardiolipin Stephania IgG and IgM [LAB 6836]    Collection Time: 09/23/19 12:53 PM   Result Value Ref Range    Cardiolipin Antibody IgG <1.6 0.0 - 19.9 GPL-U/mL    Cardiolipin Antibody IgM 0.3 0.0 - 19.9 MPL-U/mL   CBC with platelets differential [XKI299]    Collection Time: 09/23/19 12:53 PM   Result Value Ref Range    WBC 5.9 4.0 - 11.0 10e9/L    RBC Count 3.02 (L) 3.8 - 5.2 10e12/L    Hemoglobin 8.6 (L) 11.7 - 15.7 g/dL    Hematocrit 27.5 (L) 35.0 - 47.0 %    MCV 91 78 - 100 fl    MCH 28.5 26.5 - 33.0 pg    MCHC 31.3 (L) 31.5 - 36.5 g/dL    RDW 13.6 10.0 - 15.0 %    Platelet Count 165 150 - 450 10e9/L    Diff Method Automated Method     % Neutrophils 73.2 %    % Lymphocytes 21.2 %    % Monocytes 3.8 %    % Eosinophils 1.0 %    % Basophils 0.3 %    % Immature Granulocytes 0.5 %    Nucleated RBCs 0 0 /100    Absolute Neutrophil 4.3 1.6 - 8.3 10e9/L    Absolute Lymphocytes 1.2 0.8 - 5.3 10e9/L    Absolute Monocytes 0.2 0.0 - 1.3 10e9/L    Absolute Eosinophils 0.1 0.0 - 0.7 10e9/L    Absolute Basophils 0.0 0.0 - 0.2 10e9/L    Abs Immature Granulocytes 0.0 0 - 0.4 10e9/L    Absolute Nucleated RBC 0.0    Quantiferon TB Gold Plus    Collection Time: 09/23/19 12:53 PM   Result Value Ref Range    Quantiferon-TB Gold Plus Result Negative NEG^Negative    TB1 Ag minus Nil Value 0.00 IU/mL    TB2 Ag minus Nil Value 0.00 IU/mL    Mitogen minus Nil Result >10.00 IU/mL    Nil Result 0.03 IU/mL   Phosphorus    Collection Time: 09/23/19 12:53 PM   Result Value Ref Range    Phosphorus 3.5 2.5 - 4.5 mg/dL   Comprehensive metabolic panel [LAB17]    Collection Time: 09/23/19 12:53 PM   Result Value Ref Range    Sodium 133 133 - 144 mmol/L    Potassium 3.7 3.4 - 5.3 mmol/L    Chloride 98 94 - 109 mmol/L    Carbon Dioxide 27 20 - 32 mmol/L    Anion Gap 8 3 - 14 mmol/L    Glucose 180 (H)  70 - 99 mg/dL    Urea Nitrogen 12 7 - 30 mg/dL    Creatinine 0.56 0.52 - 1.04 mg/dL    GFR Estimate >90 >60 mL/min/[1.73_m2]    GFR Estimate If Black >90 >60 mL/min/[1.73_m2]    Calcium 8.5 8.5 - 10.1 mg/dL    Bilirubin Total 0.2 0.2 - 1.3 mg/dL    Albumin 3.5 3.4 - 5.0 g/dL    Protein Total 7.6 6.8 - 8.8 g/dL    Alkaline Phosphatase 154 (H) 40 - 150 U/L    ALT 35 0 - 50 U/L    AST 38 0 - 45 U/L   Blood Group A Subtype [AGD0554]    Collection Time: 09/23/19 12:54 PM   Result Value Ref Range    Antigen Type Canceled, Test credited     Blood Bank Comment       Patient is not ABO Type A or AB.  A1 subtyping is not applicable. 9/23/19 ak   Antibody titer red cell [[id number]]    Collection Time: 09/23/19 12:54 PM   Result Value Ref Range    Antibody Titer ANTI A: IgM 64, IgG 128    ABO/Rh type and screen    Collection Time: 09/23/19 12:54 PM   Result Value Ref Range    ABO B     RH(D) Pos     Antibody Screen Neg     Test Valid Only At          [facility]    Specimen Expires 09/26/2019    Factor 2 and 5 mutation analysis    Collection Time: 09/23/19 12:54 PM   Result Value Ref Range    Copath Report       Patient Name: CONCETTA MAYS  MR#: [id number]  Specimen #: [id number]  Collected: 9/23/2019 12:54  Received: 9/23/2019 15:24  Reported: 9/27/2019 17:45  Ordering Phy(s): JAVON MAYER  Additional Phy(s): NARESH CONNORS    For improved result formatting, select 'View Enhanced Report Format' under   Linked Documents section.  _________________________________________    TEST(S) REQUESTED:  Factor 5 Leiden and Factor 2 by PCR    SPECIMEN DESCRIPTION:  Blood    INTERPRETATION:    Factor 5 Leiden and Prothrombin T63339K testing was previously ordered on   this patient.  This is the  report  from the specimen collected 06/30/2013 04:09, [facility] number [id number]    Testing on the duplicate sample received  will be canceled and credited.    [facility] Report 06/30/2013 4:09 AM 88  Patient Name: CONCETTA MAYS   R#: [id number] #:  [id number]: 6/30/2013 04:09Received: 7/1/2013 08:18Reported:   7/2/2013 16:26Ordering Phy(s): NHI LUNA________________________________ _________TEST(S) REQUESTED:Factor 5   Leiden and Factor 2 by PCRSPECIMEN  DESCRIPTION:BloodMETHODOLOGY:  The regions of genomic DNA containing the   S9024N Factor 5gene mutation (Factor  V Leiden) and the Factor 2(Prothrombin Y21565D)gene mutation were   simultaneously amplified using the  polymerase chainreaction. The amplified products were digested with   restrictionendonuclease TaqI and products  were analyzed by gel electrophoresis.RESULTS:Factor V 1691G>A (Leiden)   RESULTS:Mutation analyzed:  1691G>AFactor V 1691G>A (Leiden) Interpretation:   ABSENTFactor V 1691G>A   (Leiden) mutation genotype:  G/GFACTOR 2/PROTHROMBIN RESULTS:Mutation analyzed:   15090T>AFactor 2   Mutation Interpretation:   ABSENTFactor  2 Mutation genotype:   G/GINTERPRETATION:The patient is negative for the   Factor V 1691G>A (Leiden) and  negativefor the Factor 2 mutation.This test was developed and its   performance determined by the [facility] oratory.It has   not been cleared or approved by the  U.S. Food and DrugAdministration. The FDA has determined that such   clearance or approvalis not necessary.  Pursuant to the requirements of CLIA'88, thislaboratory has established   and verified the test's accuracy  andprecision. This test is used for clinical purposes.Electronically   Signed Out By:Juan Manuel Guillory M.D.,  UMPhysiciansTESTING LAB LOCATION:[id number] [place] [address] [phone] SITE:[facility]    Electronically Signed Out By:  DEBBIE     CPT Codes:    TESTING LAB LOCATION:  [place]  [facility]  [address] [facility] [address] [place] [address] [phone]    COLLECTION SITE:  Client:  [facility] [place] [place]  Location:  UCLAB (B)     UA with Microscopic reflex to Culture    Collection Time: 09/23/19 12:58 PM   Result Value Ref Range    Color Urine Yellow     Appearance Urine Clear     Glucose Urine 50 (A) NEG^Negative mg/dL    Bilirubin Urine Negative NEG^Negative    Ketones Urine Negative NEG^Negative mg/dL    Specific Gravity Urine 1.011 1.003 - 1.035    Blood Urine Negative NEG^Negative    pH Urine 7.0 5.0 - 7.0 pH    Protein Albumin Urine Negative NEG^Negative mg/dL    Urobilinogen mg/dL 0.0 0.0 - 2.0 mg/dL    Nitrite Urine Negative NEG^Negative    Leukocyte Esterase Urine Negative NEG^Negative    Source Midstream Urine     WBC Urine 2 0 - 5 /HPF    RBC Urine 1 0 - 2 /HPF    Bacteria Urine Few (A) NEG^Negative /HPF    Squamous Epithelial /HPF Urine 2 (H) 0 - 1 /HPF   ABO type [RCI7548]    Collection Time: 09/23/19  1:03 PM   Result Value Ref Range    ABO B     RH(D) Pos     Specimen Expires 09/26/2019            Patient was seen by myself, Dr. Jarvis Johnson, in conjunction with Laura Vieyra, as part of a shared visit.    I personally reviewed past medical and surgical history, vital signs, medications and labs.  Present and past medical history, along with significant physical exam findings were all reviewed with JUDI.    My [place] findings:  Beverly Elena is a 43 year old year old female with Type 1 Diabetes, who presents for Pancreas transplant evaluation.    Patient with pancreas divisum and underwent pancreatectomy and auto islet for chronic pancreatitis and developed DMI quickly after. On insulin pump currently. 40-60 units / day. a1c last 10-16. Unawareness.     Chronic diarrhea - pancreatic insufficiency. Gastroparesis with gastric pacer. Daily nausea. Albumin 2-3. J-G tube placement, now removed due to  infection.    TPN currently.     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 Last scoped 08/2019 with grade II varices with no intervention.     No CAD. EF 60%.    Hx of PE, was on coumadin, now off anticoagulation.     CKD - creatinine 1-1.3 mg / dl. No proteinuria in 2018.     Key management decisions made by me and discussed with JUDI:  1. Pancreas transplant evaluation - patient is a fair candidate overall.  The patient has multiple complications of her type 1 diabetes including hypoglycemic unawareness.  However she has multiple concerning features that would preclude proceeding with pancreas transplantation at this time.  However, I would list her to accrue time and inactively on the pancreas transplant wait list given her hypoglycemic unawareness and brittle diabetes in the hopes that we will be able to optimize her for transplantation over the coming months.  2. Type 1 Diabetes: Continue with insulin pump and continuous glucose monitoring as she is planning.  3. Poor nutrition status: The patient recently had a G J-tube removed due to chronic wound and is currently on TPN.  Despite this, the patient's albumin remains quite low with no significant albuminuria on her most recent urine checks.  This likely represents both an inflamed state due to her infections as well as inadequate nutrition due to her 20-30 bowel movements per day despite being on pancreatic enzymes.    I do think the patient would benefit from a second gastroenterology evaluation and we would be happy to offer that second opinion here to evaluate for any changes that could be made to optimize her nutritional status.  4. Diarrhea: The patient is currently on pancreatic enzyme replacement with Creon but continues to have significant bowel movements up to 30 times per day.  We did discuss that this would need to be improved so that we could achieve adequate immunosuppression preferably with tacrolimus  and mycophenolate derivative.    We did discuss how MMF can worsen underlying diarrhea.  Once she is optimized from a nutritional standpoint and diarrhea standpoint I would recommend a trial of immunosuppression to show that she can tolerate the medications and achieve a drug level that is stable.  5. Abdominal wound: The patient will continue with her wound clinic  6. Recurrent GI bleeding: The patient has a history of portal vein thrombosis in the setting of her auto islet transplant.  However, her most recent liver ultrasound showed the portal vein had recannulized and had normal flow.  She had an EGD in August that showed grade 2 varices but no evidence of stigmata of bleeding.  Given her history of pulmonary embolism she will need anticoagulation at least perioperatively which would potentially worsen her GI bleed if there is arterial venous malformations in her small bowel that have not been dealt with.  7. Hx of PE: I think the patient would benefit from being evaluated by clotting and bleeding clinic given her history of pulmonary embolus and intolerance of anticoagulation due to recurrent GI bleeds.  However, this should be done after gastroenterology evaluation for her recurrent GI bleed  8. Hx of Bacteremia: The patient will need continued surveillance to make sure she does not have a potential nidus of infection with her port  9. CKD II: Currently the patient's baseline creatinine is approximately 1 mg/dL.  She did not have significant proteinuria when this was evaluated in 2018.  We did discuss the possibility of having continued progression of chronic kidney disease and need for kidney transplantation in the future.  10. Cancer screens: keep up to date with mammo, pap.        Again, thank you for allowing me to participate in the care of your patient.      Sincerely,    DONI

## 2019-09-23 NOTE — PROGRESS NOTES
"Outpatient MNT: Pancreas Transplant Evaluation    Current BMI: 22.2 (HT 61 in,  lbs/54 kg)  BMI is within criteria of <32 for pancreas transplant     Time Spent: 15 minutes  Visit Type: F/U (last seen by OP RD s/p TP AIT in 2015)  Referring Physician: Finger  Pt accompanied by: her , Chau     Medical dx associated with RD referral  - DM I  - s/p TP AIT 6/2013    Frequency of BG checks: unknown     Nutrition Assessment  Pt started on TPN 1.5 years ago. She is currently managed by the Pharmacist at [facility]. She has been on this regimen for a long time, w/o changes. Attempted to retrieve formula 3 times, but w/o success (phone issues; unable to connect with staff).     Pt was also receiving TF (most recently it appears to be Peptamen 1.5) in addition to PN up until 6/2019. Her GJ tube was removed, as it was leaky, necrotic, etc. It was not replaced as she couldn't tolerate any rate >20 ml/hr. She was infusing via J tube and reports painful bowel spasms with a higher rate. She could not infuse via G tube with her gastroparesis. She reports she is on PN for absorption issues (ERNESTO), not gastroparesis related.     She does eat some for pleasure, but appetite is hit or miss. Portion size varies day to day. Protein and fat overall don't sit well with her. Her diet is higher in carbohydrates. Commonly consumed foods include cucumbers, carrots, tomatoes, toast, other carbs. Does some eggs or dairy. No meat. Drinks water ( oz/day) and 1-2 Diet Coke. Alcohol is 1 drink/month at the most and she eats out a few times/week.     Vitamins, Supplements, Pertinent Meds: vit D, AQUADEKS, probiotic, zofran, compazine, reglan, remeron  Herbal Medicines/Supplements: none     PERT: Creon 24 x 2 with food. Doesn't take all the time, as she doesn't notice any difference or improvement in steatorrhea. She reports having been on all the various enzyme brands in the past and Creon is the one that \"works the " "best\", however, she has nausea when taking. Dose is 889 ul/kg/meal (within therapeutic range), taking just before eating. She always has oil in her stools.     Pt had gastric pacemaker placed in 2012. She reports this helped somewhat x 4-6 months, but nothing significant.     Physical Activity  None      Anthropometrics  Height:   61 in   BMI:    22.2    Weight Status:Normal BMI   Weight:  118 lbs            IBW (lb): 105  % IBW: 112    Wt Hx: Pt reports weight fluctuates within typical range of 100-120 lbs. Was down to 89 lbs prior to starting PN.     Adj/dosing BW: 118 lbs/54 kg       Labs  Lab Results   Component Value Date    A1C 15.9 03/11/2018    A1C 13.1 07/06/2017    A1C 12.8 06/15/2017    A1C 5.9 08/05/2015    A1C 5.4 01/12/2015       Malnutrition  % Intake: No decreased intake noted  % Weight Loss: None noted  Subcutaneous Fat Loss: None  Muscle Loss: None  Fluid Accumulation/Edema: None noted  Malnutrition Diagnosis: Patient does not meet two of the above criteria necessary for diagnosing malnutrition. However, pt at risk for malnutrition and/or at minimum non-severe malnutrition in context of chronic illness d/t reliance on PN, with continued malabsorption/steatorrhea.      Estimated Nutrition Needs  Energy  [id number]    (25-30 kcal/kg for maintenance)     Protein  43-54    (0.8-1 g/kg for maintenance)           Fluid  1 ml/kcal or per MD     Nutrition Diagnosis  Food and nutrition related knowledge deficit r/t pre pancreas transplant eval AEB pt verbalized not hearing pre/post transplant diet guidelines.      Nutrition Intervention  Nutrition education provided:  Reviewed current diet. Pt has no questions/concerns about diet at this time.     Reviewed post txp diet guidelines in brief (will review in further detail post txp):  (1) Review of proper food safety measures d/t immunosuppressant therapy post-op and increased risk for food-borne illness    (2) Avoid the following post txp d/t risk for " rejection, unknown effects on the organs, and/or potential interactions with immunosuppressants:  - Herbal, Chinese, holistic, chiropractic, natural, alternative medicines and supplements  - Detoxes and cleanses  - Weight loss pills  - Protein powders or other products with extracts or herbs (ie green tea extract)    (3) Med regimen and possible side effects    Patient Understanding: Pt verbalized understanding of education provided.  Expected Compliance: Good  Follow-Up Plans: PRN     Nutrition Goals  1. Pt to verbalize understanding of 3 aspects of post txp education provided    Provided pt with contact info.   Carmen De La Vega RD, LD  [facility] [phone]

## 2019-09-23 NOTE — PROGRESS NOTES
Psychosocial Assessment  Patient Name/ Age: Beverly Elena 43 year old   Medical Record #: [id number]  Duration of Interview: 25 min  Process:   Face-to-Face Interview                (counseling < 50%)   Present at Appointment: Beverly and her  Chau        : CHRIS Jackson LICSW Date:  September 23, 2019        Type of transplant: Pancreas    Donor type:      Cadaver   Prior Transplants:    Yes -Auto Islet 2013 Status of Transplant: Failed           Current Living Situation    Location:   [address] [place] [id number]  With Whom: lives with their spouse       Family/ Social Support:    Beverly does not have any children. She has a brother who lives in [place] and a sister who lives in [place]. Her mother splits her time living with Beverly and on the family farm outside of [place], [place]. 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 She was previously an Emergency Room Physician. Her  is employed full time.    Income   Salary/wages and Spouse's Income   Insurance      At this time, patient can afford medication costs:  Yes  Private Insurance- [facility] through employer and [facility] through spouse employer       Medical Status    Current Mode of Treatment for ESRD None   Complications- Diabetes Glucose Unawareness       Behavioral    Tobacco Use No Chemical Dependency No   Beverly denied any tobacco use.  Beverly reported she has a glass of wine once a month. She denied any current or history of substance use.      Psychiatric  Impairment No  Beverly denied any current or history of anxiety, depression, or other mental health concerns.     Reading Ability: Good  Education Level: Doctorate (MD) Recent Legal History No      Coping Style/Strategies: Scrapbook       Ability to Adhere to Complex Medical Regime: Yes     Adherence History: No concerns noted.         Education  _X_ Medicare  _X_ Rehabilitation  _X_ Donor issues  _X_ Community resources  _X_ Post discharge housing  _X_ Financial resources  _X_ Medical insurance options  _X_ Psych adjustment  _X_ Family adjustment  _X_ Health Care Directive Provided Education   Psychosocial Risks of Transplant Reviewed and Discussed:  _X_ Increased stress related to emotional,            family, social, employment or financial           situation  _X_ Affect on work and/or disability benefits  _X_ Affect on future health and life           insurance  _X_ Transplant outcome expectations may           not be met  _X_ Mental Health Risks: anxiety,           depression, PTSD, guilt, grief and           chronic fatigue     Notable Items:   None noted.       Final Evaluation/Assessment   Patient seemed to process information well. Appeared well informed, motivated and able to follow post transplant requirements. Behavior was appropriate during interview. Has adequate income and insurance coverage. Adequate social support. No major contraindications noted for transplant.  At this time patient appears to understand the risks and benefits of transplant.      Recommendation  Acceptable    Selection Criteria Met:  Plan for support Yes   Chemical Dependence Yes   Smoking Yes   Mental Health Yes   Adequate Finances Yes    Signature: CHRIS Jackson [facility]   Title: Clinical

## 2019-09-23 NOTE — PROGRESS NOTES
Patient was seen by myself, Dr. Jarvis Johnson, in conjunction with Laura Vieyra, as part of a shared visit.    I personally reviewed past medical and surgical history, vital signs, medications and labs.  Present and past medical history, along with significant physical exam findings were all reviewed with JUDI.    My london findings:  Beverly Elena is a 43 year old year old female with Type 1 Diabetes, who presents for Pancreas transplant evaluation.    Patient with pancreas divisum and underwent pancreatectomy and auto islet for chronic pancreatitis and developed DMI quickly after. On insulin pump currently. 40-60 units / day. a1c last 10-16. Unawareness.     Chronic diarrhea - pancreatic insufficiency. Gastroparesis with gastric pacer. Daily nausea. Albumin 2-3. J-G tube placement, now removed due to infection.    TPN currently.     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 Last scoped 08/2019 with grade II varices with no intervention.     No CAD. EF 60%.    Hx of PE, was on coumadin, now off anticoagulation.     CKD - creatinine 1-1.3 mg / dl. No proteinuria in 2018.     Key management decisions made by me and discussed with JUDI:  1. Pancreas transplant evaluation - patient is a fair candidate overall.  The patient has multiple complications of her type 1 diabetes including hypoglycemic unawareness.  However she has multiple concerning features that would preclude proceeding with pancreas transplantation at this time.  However, I would list her to accrue time and inactively on the pancreas transplant wait list given her hypoglycemic unawareness and brittle diabetes in the hopes that we will be able to optimize her for transplantation over the coming months.  2. Type 1 Diabetes: Continue with insulin pump and continuous glucose monitoring as she is planning.  3. Poor nutrition status: The patient recently had a G J-tube removed due to chronic wound and is  currently on TPN.  Despite this, the patient's albumin remains quite low with no significant albuminuria on her most recent urine checks.  This likely represents both an inflamed state due to her infections as well as inadequate nutrition due to her 20-30 bowel movements per day despite being on pancreatic enzymes.    I do think the patient would benefit from a second gastroenterology evaluation and we would be happy to offer that second opinion here to evaluate for any changes that could be made to optimize her nutritional status.  4. Diarrhea: The patient is currently on pancreatic enzyme replacement with Creon but continues to have significant bowel movements up to 30 times per day.  We did discuss that this would need to be improved so that we could achieve adequate immunosuppression preferably with tacrolimus and mycophenolate derivative.    We did discuss how MMF can worsen underlying diarrhea.  Once she is optimized from a nutritional standpoint and diarrhea standpoint I would recommend a trial of immunosuppression to show that she can tolerate the medications and achieve a drug level that is stable.  5. Abdominal wound: The patient will continue with her wound clinic  6. Recurrent GI bleeding: The patient has a history of portal vein thrombosis in the setting of her auto islet transplant.  However, her most recent liver ultrasound showed the portal vein had recannulized and had normal flow.  She had an EGD in August that showed grade 2 varices but no evidence of stigmata of bleeding.  Given her history of pulmonary embolism she will need anticoagulation at least perioperatively which would potentially worsen her GI bleed if there is arterial venous malformations in her small bowel that have not been dealt with.  7. Hx of PE: I think the patient would benefit from being evaluated by clotting and bleeding clinic given her history of pulmonary embolus and intolerance of anticoagulation due to recurrent GI  bleeds.  However, this should be done after gastroenterology evaluation for her recurrent GI bleed  8. Hx of Bacteremia: The patient will need continued surveillance to make sure she does not have a potential nidus of infection with her port  9. CKD II: Currently the patient's baseline creatinine is approximately 1 mg/dL.  She did not have significant proteinuria when this was evaluated in 2018.  We did discuss the possibility of having continued progression of chronic kidney disease and need for kidney transplantation in the future.  10. Cancer screens: keep up to date with mammo, pap.

## 2019-09-24 LAB
C PEPTIDE SERPL-MCNC: 0.2 NG/ML (ref 0.9–6.9)
CARDIOLIPIN ANTIBODY IGG: <1.6 GPL-U/ML (ref 0–19.9)
CARDIOLIPIN ANTIBODY IGM: 0.3 MPL-U/ML (ref 0–19.9)
CMV IGG SERPL QL IA: <0.2 AI (ref 0–0.8)
EBV VCA IGG SER QL IA: 0.6 AI (ref 0–0.8)
HBV CORE AB SERPL QL IA: NONREACTIVE
HBV SURFACE AB SERPL IA-ACNC: 29.78 M[IU]/ML
HBV SURFACE AG SERPL QL IA: NONREACTIVE
HCV AB SERPL QL IA: NONREACTIVE
HIV 1+2 AB+HIV1 P24 AG SERPL QL IA: NONREACTIVE
INTERPRETATION ECG - MUSE: NORMAL
T PALLIDUM AB SER QL: NONREACTIVE
THROMBIN TIME: 15.9 SEC (ref 13–19)
VZV IGG SER QL IA: 1.2 AI (ref 0–0.8)

## 2019-09-25 LAB
A* LOCUS: NORMAL
A*: NORMAL
ABTEST METHOD: NORMAL
B* LOCUS: NORMAL
B*: NORMAL
BLD GP AB SCN TITR SERPL: NORMAL {TITER}
BW-1: NORMAL
C* LOCUS: NORMAL
C*: NORMAL
DPA1* LOCUS NMDP: NORMAL
DPA1* NMDP: NORMAL
DPA1*: NORMAL
DPA1*LOCUS: NORMAL
DPB1* LOCUS NMDP: NORMAL
DPB1* NMDP: NORMAL
DPB1*: NORMAL
DPB1*LOCUS: NORMAL
DQA1*: NORMAL
DQA1*LOCUS: NORMAL
DQB1* LOCUS: NORMAL
DQB1*: NORMAL
DRB1* LOCUS: NORMAL
DRB1*: NORMAL
DRB3* LOCUS: NORMAL
DRSSO TEST METHOD: NORMAL
GAMMA INTERFERON BACKGROUND BLD IA-ACNC: 0.03 IU/ML
M TB IFN-G BLD-IMP: NEGATIVE
M TB IFN-G CD4+ BCKGRND COR BLD-ACNC: >10 IU/ML
MITOGEN IGNF BCKGRD COR BLD-ACNC: 0 IU/ML
MITOGEN IGNF BCKGRD COR BLD-ACNC: 0 IU/ML

## 2019-09-26 LAB — LA PPP-IMP: NEGATIVE

## 2019-09-27 LAB
COPATH REPORT: NORMAL
PROTOCOL CUTOFF: NORMAL
SA1 CELL: NORMAL
SA1 COMMENTS: NORMAL
SA1 HI RISK ABY: NORMAL
SA1 MOD RISK ABY: NORMAL
SA1 TEST METHOD: NORMAL
SA2 CELL: NORMAL
SA2 COMMENTS: NORMAL
SA2 HI RISK ABY UA: NORMAL
SA2 MOD RISK ABY: NORMAL
SA2 TEST METHOD: NORMAL
UNACCEPTABLE ANTIGEN: NORMAL
UNOS CPRA: 80

## 2019-10-02 ENCOUNTER — COMMITTEE REVIEW (OUTPATIENT)
Dept: TRANSPLANT | Facility: CLINIC | Age: 44
End: 2019-10-02

## 2019-10-02 NOTE — COMMITTEE REVIEW
Abdominal Committee Review Note     Evaluation Date: 9/23/2019  Committee Review Date: 10/3/2019    Organ being evaluated for: Pancreas    Transplant Phase: Evaluation  Transplant Status: Active    Transplant Coordinator: Jeanette Hamilton  Transplant Surgeon:   Rajendra Cruz    Referring Physician: Jabier Heath    Primary Diagnosis:   Secondary Diagnosis:     Committee Review Members:  Nephrology Jarvis Johnson MD, Wilfredo Vieyra, APRN CNP, Bright Baxter MD   Nutrition Carmen De La Vega, RD   Pharmacy Michael Eugene, [facility]    - Clinical Alexa Emily Rios, [facility]   Transplant Sarah Dupree PA-C, Jeanette Hamilton, RN, Brigitte Strickland, NP, Yajaira Flores, RN, Nancie Concepcion, FRANK, Anabel Escalante, FRANK, Kemar Garibay MD, Andree Niño MD, MD       Transplant Eligibility: Insulin-dependent diabetes mellitus    Committee Review Decision: Needs Re-presentation    Relative Contraindications: malabsorption    Absolute Contraindications: None    Committee Chair Andree Niño MD verbally attested to the committee's decision.    Committee Discussion Details:   Case reviewed by team.  Prior to consideration to be listed on the pancreas list, team recommends  Diarrhea/nutritional status - followup w/local Gastroenterologist (may offer appointment with [facility] gastroenterologist, also)  to evaluate nutritional  status/tpn, diarrhea, recurrent gi bleeding.  Will need to show patient can absorb medications prior to listing.  This includes an immunosuppression trial (?MMF derivative once nutritional and diarrhea status is optimized). Once is completed, the team can re-review to determine if appropriate for listing.    Team then recommends:  2) Liver ultrasound to assess for a clot (may need a transjugular biopsy depending upon these results).  3) Gastric Emptying study  4) Needs Serum HCG drawn as wasn't ordered on 09/23/19.  5) Hematology (bleeding/clotting disorders clinic)   - History of Pulmonary embolism.   6) Urology Consult for incomplete bladder emptying.  7) Cardiology assessment

## 2019-10-08 ENCOUNTER — TELEPHONE (OUTPATIENT)
Dept: TRANSPLANT | Facility: CLINIC | Age: 44
End: 2019-10-08

## 2019-10-08 NOTE — TELEPHONE ENCOUNTER
"Called patient and reviewed the recommendations from the multi-disciplinary transplant committee.  Explained the Committee/Team first recommends patient be re-evaluated by local gastroenterologist (or [facility] gastroenterology) to evaluate nutritional status, tpn, and recurrent gastrointestinal bleeding.  Reviewed the Team has concerns that patient will be able to absorb immunosuppressant medications after a pancreas transplant. Once patient has been seen by gastroenterology and if nutritional status is optimized, then the team recommends a trial of immunosuppressive medication such as a mycophenolate derivative to determine if patient will be able to absorb the medication.  Patient reports, \"I am not optimistic, because we've already tried everything.  I couldn't tolerate tube feeds as I would get severe cramping and pain.\"  Patient does state she will scheduled an appointment with her local gastroenterology clinic and I also offered an appointment with [facility] Gastroenterology, but patient reports she will be seen locally, first.   I also reviewed the other tests/consults the team recommended, if patient's nutritional/absorption status is optimized, but noted these items do not need to be scheduled at this time.  Patient does report she underwent a liver ultrasound when she was hospitalized in August, so will request this report the Team to review.  Also reviewed the team did not recommend patient be listed on the pancreas list at this time.  Explained once patient has completed the gastroenterology assessment/evaluation, this will be reviewed by the Team.  Noted I will send the patient an evaluation summary letter with all information and will send a copies to patient's providers.  Patient verbally agrees to contact me once she has scheduled the appointment with local gastroenterologist, so I may send this provider a copy of the letter as well.  Patient reports she has no questions at this time and verbalizes " understanding of all information presented.

## 2020-01-01 ENCOUNTER — HEALTH MAINTENANCE LETTER (OUTPATIENT)
Age: 45
End: 2020-01-01

## 2020-03-01 ENCOUNTER — HEALTH MAINTENANCE LETTER (OUTPATIENT)
Age: 45
End: 2020-03-01

## 2021-01-01 ENCOUNTER — HEALTH MAINTENANCE LETTER (OUTPATIENT)
Age: 46
End: 2021-01-01

## 2021-04-06 ENCOUNTER — POST MORTEM DOCUMENTATION (OUTPATIENT)
Dept: TRANSPLANT | Facility: CLINIC | Age: 46
End: 2021-04-06

## (undated) DEVICE — SUCTION MANIFOLD DORNOCH ULTRA CART UL-CL500

## (undated) DEVICE — GUIDEWIRE TERUMO .035X260 3CM STR TIP GS3504

## (undated) DEVICE — SOL WATER IRRIG 1000ML BOTTLE 2F7114

## (undated) DEVICE — WIRE GUIDE AMPLATZ SUPER STIFF 0.035"X260CM M001465261

## (undated) DEVICE — PACK ENDOSCOPY GI CUSTOM UMMC

## (undated) DEVICE — ENDO BITE BLOCK ADULT OMNI-BLOC

## (undated) DEVICE — KIT CONNECTOR FOR OLYMPUS ENDOSCOPES DEFENDO 100310

## (undated) DEVICE — KIT ENDO FIRST STEP DISINFECTANT 200ML W/POUCH EP-4

## (undated) DEVICE — TAPE CLOTH 3" CARDINAL 3TRCL03

## (undated) RX ORDER — FENTANYL CITRATE 50 UG/ML
INJECTION, SOLUTION INTRAMUSCULAR; INTRAVENOUS
Status: DISPENSED
Start: 2018-03-12

## (undated) RX ORDER — SODIUM CHLORIDE, SODIUM LACTATE, POTASSIUM CHLORIDE, CALCIUM CHLORIDE 600; 310; 30; 20 MG/100ML; MG/100ML; MG/100ML; MG/100ML
INJECTION, SOLUTION INTRAVENOUS
Status: DISPENSED
Start: 2018-03-12

## (undated) RX ORDER — LIDOCAINE HYDROCHLORIDE 20 MG/ML
INJECTION, SOLUTION EPIDURAL; INFILTRATION; INTRACAUDAL; PERINEURAL
Status: DISPENSED
Start: 2018-03-12

## (undated) RX ORDER — ONDANSETRON 2 MG/ML
INJECTION INTRAMUSCULAR; INTRAVENOUS
Status: DISPENSED
Start: 2018-03-12

## (undated) RX ORDER — PROPOFOL 10 MG/ML
INJECTION, EMULSION INTRAVENOUS
Status: DISPENSED
Start: 2018-03-12